# Patient Record
Sex: MALE | Race: ASIAN | NOT HISPANIC OR LATINO | Employment: OTHER
[De-identification: names, ages, dates, MRNs, and addresses within clinical notes are randomized per-mention and may not be internally consistent; named-entity substitution may affect disease eponyms.]

---

## 2017-01-04 ENCOUNTER — RECORDS - HEALTHEAST (OUTPATIENT)
Dept: ADMINISTRATIVE | Facility: OTHER | Age: 72
End: 2017-01-04

## 2017-01-04 ENCOUNTER — COMMUNICATION - HEALTHEAST (OUTPATIENT)
Dept: ADMINISTRATIVE | Facility: CLINIC | Age: 72
End: 2017-01-04

## 2017-01-05 ENCOUNTER — OFFICE VISIT - HEALTHEAST (OUTPATIENT)
Dept: AUDIOLOGY | Facility: CLINIC | Age: 72
End: 2017-01-05

## 2017-01-05 DIAGNOSIS — H90.3 SENSORINEURAL HEARING LOSS, BILATERAL: ICD-10-CM

## 2017-01-10 ENCOUNTER — RECORDS - HEALTHEAST (OUTPATIENT)
Dept: ADMINISTRATIVE | Facility: OTHER | Age: 72
End: 2017-01-10

## 2017-01-16 ENCOUNTER — OFFICE VISIT - HEALTHEAST (OUTPATIENT)
Dept: FAMILY MEDICINE | Facility: CLINIC | Age: 72
End: 2017-01-16

## 2017-01-16 DIAGNOSIS — E78.2 MIXED HYPERLIPIDEMIA: ICD-10-CM

## 2017-01-16 DIAGNOSIS — E11.9 TYPE 2 DIABETES MELLITUS WITHOUT COMPLICATION (H): ICD-10-CM

## 2017-01-16 DIAGNOSIS — Z23 NEED FOR PNEUMOCOCCAL VACCINATION: ICD-10-CM

## 2017-01-16 DIAGNOSIS — R06.09 DYSPNEA ON EXERTION: ICD-10-CM

## 2017-01-16 DIAGNOSIS — I10 ESSENTIAL HYPERTENSION: ICD-10-CM

## 2017-01-16 LAB
ALT SERPL W P-5'-P-CCNC: 33 U/L (ref 0–45)
AST SERPL W P-5'-P-CCNC: 27 U/L (ref 0–40)
CHOLEST SERPL-MCNC: 233 MG/DL
FASTING STATUS PATIENT QL REPORTED: YES
HBA1C MFR BLD: 9 % (ref 3.5–6)
HDLC SERPL-MCNC: 43 MG/DL
LDLC SERPL CALC-MCNC: 156 MG/DL
TRIGL SERPL-MCNC: 171 MG/DL

## 2017-01-16 ASSESSMENT — MIFFLIN-ST. JEOR: SCORE: 1580.21

## 2017-01-23 ENCOUNTER — OFFICE VISIT - HEALTHEAST (OUTPATIENT)
Dept: FAMILY MEDICINE | Facility: CLINIC | Age: 72
End: 2017-01-23

## 2017-01-23 DIAGNOSIS — E78.2 MIXED HYPERLIPIDEMIA: ICD-10-CM

## 2017-01-23 DIAGNOSIS — E11.9 TYPE 2 DIABETES MELLITUS WITHOUT COMPLICATION (H): ICD-10-CM

## 2017-01-23 DIAGNOSIS — Z00.00 HEALTH CARE MAINTENANCE: ICD-10-CM

## 2017-01-23 DIAGNOSIS — I10 ESSENTIAL HYPERTENSION: ICD-10-CM

## 2017-01-23 DIAGNOSIS — Z23 NEED FOR VACCINATION: ICD-10-CM

## 2017-01-26 ENCOUNTER — COMMUNICATION - HEALTHEAST (OUTPATIENT)
Dept: FAMILY MEDICINE | Facility: CLINIC | Age: 72
End: 2017-01-26

## 2017-02-15 ENCOUNTER — COMMUNICATION - HEALTHEAST (OUTPATIENT)
Dept: FAMILY MEDICINE | Facility: CLINIC | Age: 72
End: 2017-02-15

## 2017-02-15 DIAGNOSIS — E11.9 TYPE 2 DIABETES MELLITUS WITHOUT COMPLICATION (H): ICD-10-CM

## 2017-02-22 ENCOUNTER — OFFICE VISIT - HEALTHEAST (OUTPATIENT)
Dept: AUDIOLOGY | Facility: CLINIC | Age: 72
End: 2017-02-22

## 2017-02-22 ENCOUNTER — RECORDS - HEALTHEAST (OUTPATIENT)
Dept: ADMINISTRATIVE | Facility: OTHER | Age: 72
End: 2017-02-22

## 2017-02-22 DIAGNOSIS — H90.3 SENSORINEURAL HEARING LOSS, BILATERAL: ICD-10-CM

## 2017-03-08 ENCOUNTER — RECORDS - HEALTHEAST (OUTPATIENT)
Dept: ADMINISTRATIVE | Facility: OTHER | Age: 72
End: 2017-03-08

## 2017-03-10 ENCOUNTER — OFFICE VISIT - HEALTHEAST (OUTPATIENT)
Dept: AUDIOLOGY | Facility: CLINIC | Age: 72
End: 2017-03-10

## 2017-03-10 DIAGNOSIS — H90.3 SENSORINEURAL HEARING LOSS, BILATERAL: ICD-10-CM

## 2017-05-03 ENCOUNTER — COMMUNICATION - HEALTHEAST (OUTPATIENT)
Dept: SLEEP MEDICINE | Facility: CLINIC | Age: 72
End: 2017-05-03

## 2017-05-03 ENCOUNTER — OFFICE VISIT - HEALTHEAST (OUTPATIENT)
Dept: AUDIOLOGY | Facility: CLINIC | Age: 72
End: 2017-05-03

## 2017-05-03 DIAGNOSIS — H90.3 SENSORINEURAL HEARING LOSS, BILATERAL: ICD-10-CM

## 2017-05-27 ENCOUNTER — COMMUNICATION - HEALTHEAST (OUTPATIENT)
Dept: FAMILY MEDICINE | Facility: CLINIC | Age: 72
End: 2017-05-27

## 2017-05-27 DIAGNOSIS — E11.9 TYPE 2 DIABETES MELLITUS WITHOUT COMPLICATION (H): ICD-10-CM

## 2017-06-01 ENCOUNTER — OFFICE VISIT - HEALTHEAST (OUTPATIENT)
Dept: FAMILY MEDICINE | Facility: CLINIC | Age: 72
End: 2017-06-01

## 2017-06-01 DIAGNOSIS — I10 ESSENTIAL HYPERTENSION: ICD-10-CM

## 2017-06-01 DIAGNOSIS — Z91.09 ENVIRONMENTAL ALLERGIES: ICD-10-CM

## 2017-06-01 DIAGNOSIS — E78.5 HYPERLIPIDEMIA: ICD-10-CM

## 2017-06-01 DIAGNOSIS — E78.2 MIXED HYPERLIPIDEMIA: ICD-10-CM

## 2017-06-01 DIAGNOSIS — E11.9 TYPE 2 DIABETES MELLITUS WITHOUT COMPLICATION (H): ICD-10-CM

## 2017-06-01 LAB
ALT SERPL W P-5'-P-CCNC: 32 U/L (ref 0–45)
AST SERPL W P-5'-P-CCNC: 23 U/L (ref 0–40)
CHOLEST SERPL-MCNC: 140 MG/DL
FASTING STATUS PATIENT QL REPORTED: YES
HBA1C MFR BLD: 11.5 % (ref 3.5–6)
HDLC SERPL-MCNC: 37 MG/DL
LDLC SERPL CALC-MCNC: 56 MG/DL
TRIGL SERPL-MCNC: 233 MG/DL

## 2017-06-01 ASSESSMENT — MIFFLIN-ST. JEOR: SCORE: 1539.95

## 2017-06-06 ENCOUNTER — COMMUNICATION - HEALTHEAST (OUTPATIENT)
Dept: FAMILY MEDICINE | Facility: CLINIC | Age: 72
End: 2017-06-06

## 2017-06-06 DIAGNOSIS — E11.9 TYPE 2 DIABETES MELLITUS WITHOUT COMPLICATION (H): ICD-10-CM

## 2017-07-18 ENCOUNTER — RECORDS - HEALTHEAST (OUTPATIENT)
Dept: ADMINISTRATIVE | Facility: OTHER | Age: 72
End: 2017-07-18

## 2017-08-07 ENCOUNTER — OFFICE VISIT - HEALTHEAST (OUTPATIENT)
Dept: FAMILY MEDICINE | Facility: CLINIC | Age: 72
End: 2017-08-07

## 2017-08-07 DIAGNOSIS — E78.2 MIXED HYPERLIPIDEMIA: ICD-10-CM

## 2017-08-07 DIAGNOSIS — E11.9 TYPE 2 DIABETES MELLITUS WITHOUT COMPLICATION (H): ICD-10-CM

## 2017-08-07 DIAGNOSIS — R80.9 MICROALBUMINURIA: ICD-10-CM

## 2017-08-07 DIAGNOSIS — I10 ESSENTIAL HYPERTENSION: ICD-10-CM

## 2017-08-07 DIAGNOSIS — N52.9 ED (ERECTILE DYSFUNCTION): ICD-10-CM

## 2017-08-07 LAB — HBA1C MFR BLD: 8.8 % (ref 3.5–6)

## 2017-08-07 ASSESSMENT — MIFFLIN-ST. JEOR: SCORE: 1526.34

## 2017-09-15 ENCOUNTER — COMMUNICATION - HEALTHEAST (OUTPATIENT)
Dept: FAMILY MEDICINE | Facility: CLINIC | Age: 72
End: 2017-09-15

## 2017-09-16 ENCOUNTER — COMMUNICATION - HEALTHEAST (OUTPATIENT)
Dept: FAMILY MEDICINE | Facility: CLINIC | Age: 72
End: 2017-09-16

## 2017-10-31 ENCOUNTER — COMMUNICATION - HEALTHEAST (OUTPATIENT)
Dept: AUDIOLOGY | Facility: CLINIC | Age: 72
End: 2017-10-31

## 2017-10-31 ENCOUNTER — COMMUNICATION - HEALTHEAST (OUTPATIENT)
Dept: SURGERY | Facility: CLINIC | Age: 72
End: 2017-10-31

## 2017-11-01 ENCOUNTER — OFFICE VISIT - HEALTHEAST (OUTPATIENT)
Dept: AUDIOLOGY | Facility: CLINIC | Age: 72
End: 2017-11-01

## 2017-11-01 DIAGNOSIS — H90.3 SENSORINEURAL HEARING LOSS, BILATERAL: ICD-10-CM

## 2018-01-10 ENCOUNTER — OFFICE VISIT - HEALTHEAST (OUTPATIENT)
Dept: FAMILY MEDICINE | Facility: CLINIC | Age: 73
End: 2018-01-10

## 2018-01-10 DIAGNOSIS — E78.5 HYPERLIPIDEMIA: ICD-10-CM

## 2018-01-10 DIAGNOSIS — Z91.09 ENVIRONMENTAL ALLERGIES: ICD-10-CM

## 2018-01-10 DIAGNOSIS — I10 ESSENTIAL HYPERTENSION: ICD-10-CM

## 2018-01-10 DIAGNOSIS — H91.90 HEARING LOSS: ICD-10-CM

## 2018-01-10 DIAGNOSIS — E11.9 TYPE 2 DIABETES MELLITUS WITHOUT COMPLICATION (H): ICD-10-CM

## 2018-01-10 DIAGNOSIS — N52.9 ED (ERECTILE DYSFUNCTION): ICD-10-CM

## 2018-01-10 LAB
ANION GAP SERPL CALCULATED.3IONS-SCNC: 9 MMOL/L (ref 5–18)
BUN SERPL-MCNC: 19 MG/DL (ref 8–28)
CALCIUM SERPL-MCNC: 9.7 MG/DL (ref 8.5–10.5)
CHLORIDE BLD-SCNC: 103 MMOL/L (ref 98–107)
CO2 SERPL-SCNC: 28 MMOL/L (ref 22–31)
CREAT SERPL-MCNC: 1.1 MG/DL (ref 0.7–1.3)
GFR SERPL CREATININE-BSD FRML MDRD: >60 ML/MIN/1.73M2
GLUCOSE BLD-MCNC: 249 MG/DL (ref 70–125)
HBA1C MFR BLD: 8.3 % (ref 3.5–6)
POTASSIUM BLD-SCNC: 4.6 MMOL/L (ref 3.5–5)
SODIUM SERPL-SCNC: 140 MMOL/L (ref 136–145)

## 2018-01-10 ASSESSMENT — MIFFLIN-ST. JEOR: SCORE: 1535.41

## 2018-01-11 ENCOUNTER — COMMUNICATION - HEALTHEAST (OUTPATIENT)
Dept: FAMILY MEDICINE | Facility: CLINIC | Age: 73
End: 2018-01-11

## 2018-01-18 ENCOUNTER — COMMUNICATION - HEALTHEAST (OUTPATIENT)
Dept: FAMILY MEDICINE | Facility: CLINIC | Age: 73
End: 2018-01-18

## 2018-01-23 ENCOUNTER — RECORDS - HEALTHEAST (OUTPATIENT)
Dept: ADMINISTRATIVE | Facility: OTHER | Age: 73
End: 2018-01-23

## 2018-04-03 ENCOUNTER — COMMUNICATION - HEALTHEAST (OUTPATIENT)
Dept: SURGERY | Facility: CLINIC | Age: 73
End: 2018-04-03

## 2018-04-10 ENCOUNTER — COMMUNICATION - HEALTHEAST (OUTPATIENT)
Dept: AUDIOLOGY | Facility: CLINIC | Age: 73
End: 2018-04-10

## 2018-04-30 ENCOUNTER — COMMUNICATION - HEALTHEAST (OUTPATIENT)
Dept: AUDIOLOGY | Facility: CLINIC | Age: 73
End: 2018-04-30

## 2018-05-06 ENCOUNTER — COMMUNICATION - HEALTHEAST (OUTPATIENT)
Dept: FAMILY MEDICINE | Facility: CLINIC | Age: 73
End: 2018-05-06

## 2018-05-06 DIAGNOSIS — I10 ESSENTIAL HYPERTENSION: ICD-10-CM

## 2018-07-19 ENCOUNTER — COMMUNICATION - HEALTHEAST (OUTPATIENT)
Dept: FAMILY MEDICINE | Facility: CLINIC | Age: 73
End: 2018-07-19

## 2018-07-24 ENCOUNTER — OFFICE VISIT - HEALTHEAST (OUTPATIENT)
Dept: FAMILY MEDICINE | Facility: CLINIC | Age: 73
End: 2018-07-24

## 2018-07-24 DIAGNOSIS — R80.9 MICROALBUMINURIA: ICD-10-CM

## 2018-07-24 DIAGNOSIS — E78.2 MIXED HYPERLIPIDEMIA: ICD-10-CM

## 2018-07-24 DIAGNOSIS — I10 ESSENTIAL HYPERTENSION: ICD-10-CM

## 2018-07-24 LAB
ALBUMIN SERPL-MCNC: 4.1 G/DL (ref 3.5–5)
ALP SERPL-CCNC: 62 U/L (ref 45–120)
ALT SERPL W P-5'-P-CCNC: 43 U/L (ref 0–45)
ANION GAP SERPL CALCULATED.3IONS-SCNC: 10 MMOL/L (ref 5–18)
AST SERPL W P-5'-P-CCNC: 39 U/L (ref 0–40)
BILIRUB SERPL-MCNC: 0.9 MG/DL (ref 0–1)
BUN SERPL-MCNC: 13 MG/DL (ref 8–28)
CALCIUM SERPL-MCNC: 10.2 MG/DL (ref 8.5–10.5)
CHLORIDE BLD-SCNC: 104 MMOL/L (ref 98–107)
CHOLEST SERPL-MCNC: 199 MG/DL
CO2 SERPL-SCNC: 27 MMOL/L (ref 22–31)
CREAT SERPL-MCNC: 1.08 MG/DL (ref 0.7–1.3)
CREAT UR-MCNC: 239.1 MG/DL
FASTING STATUS PATIENT QL REPORTED: YES
GFR SERPL CREATININE-BSD FRML MDRD: >60 ML/MIN/1.73M2
GLUCOSE BLD-MCNC: 181 MG/DL (ref 70–125)
HBA1C MFR BLD: 7.8 % (ref 3.5–6)
HDLC SERPL-MCNC: 46 MG/DL
LDLC SERPL CALC-MCNC: 105 MG/DL
MICROALBUMIN UR-MCNC: 1.39 MG/DL (ref 0–1.99)
MICROALBUMIN/CREAT UR: 5.8 MG/G
POTASSIUM BLD-SCNC: 4.6 MMOL/L (ref 3.5–5)
PROT SERPL-MCNC: 8 G/DL (ref 6–8)
SODIUM SERPL-SCNC: 141 MMOL/L (ref 136–145)
TRIGL SERPL-MCNC: 242 MG/DL

## 2018-07-25 ENCOUNTER — COMMUNICATION - HEALTHEAST (OUTPATIENT)
Dept: FAMILY MEDICINE | Facility: CLINIC | Age: 73
End: 2018-07-25

## 2018-07-31 ENCOUNTER — COMMUNICATION - HEALTHEAST (OUTPATIENT)
Dept: FAMILY MEDICINE | Facility: CLINIC | Age: 73
End: 2018-07-31

## 2018-07-31 DIAGNOSIS — E11.9 TYPE 2 DIABETES MELLITUS WITHOUT COMPLICATION (H): ICD-10-CM

## 2018-08-21 ENCOUNTER — COMMUNICATION - HEALTHEAST (OUTPATIENT)
Dept: FAMILY MEDICINE | Facility: CLINIC | Age: 73
End: 2018-08-21

## 2018-08-21 DIAGNOSIS — I10 ESSENTIAL HYPERTENSION: ICD-10-CM

## 2018-08-22 ENCOUNTER — COMMUNICATION - HEALTHEAST (OUTPATIENT)
Dept: FAMILY MEDICINE | Facility: CLINIC | Age: 73
End: 2018-08-22

## 2018-08-22 DIAGNOSIS — Z91.09 ENVIRONMENTAL ALLERGIES: ICD-10-CM

## 2018-08-22 DIAGNOSIS — I10 ESSENTIAL HYPERTENSION: ICD-10-CM

## 2018-11-27 ENCOUNTER — COMMUNICATION - HEALTHEAST (OUTPATIENT)
Dept: FAMILY MEDICINE | Facility: CLINIC | Age: 73
End: 2018-11-27

## 2018-11-27 DIAGNOSIS — E11.9 TYPE 2 DIABETES MELLITUS WITHOUT COMPLICATION (H): ICD-10-CM

## 2019-07-29 ENCOUNTER — COMMUNICATION - HEALTHEAST (OUTPATIENT)
Dept: FAMILY MEDICINE | Facility: CLINIC | Age: 74
End: 2019-07-29

## 2019-07-29 DIAGNOSIS — E11.9 TYPE 2 DIABETES MELLITUS WITHOUT COMPLICATION (H): ICD-10-CM

## 2019-07-29 DIAGNOSIS — I10 ESSENTIAL HYPERTENSION: ICD-10-CM

## 2019-08-16 ENCOUNTER — COMMUNICATION - HEALTHEAST (OUTPATIENT)
Dept: FAMILY MEDICINE | Facility: CLINIC | Age: 74
End: 2019-08-16

## 2019-08-16 DIAGNOSIS — I10 ESSENTIAL HYPERTENSION: ICD-10-CM

## 2019-08-21 ENCOUNTER — COMMUNICATION - HEALTHEAST (OUTPATIENT)
Dept: FAMILY MEDICINE | Facility: CLINIC | Age: 74
End: 2019-08-21

## 2019-08-21 DIAGNOSIS — I10 ESSENTIAL HYPERTENSION: ICD-10-CM

## 2019-09-05 ENCOUNTER — OFFICE VISIT - HEALTHEAST (OUTPATIENT)
Dept: FAMILY MEDICINE | Facility: CLINIC | Age: 74
End: 2019-09-05

## 2019-09-05 DIAGNOSIS — I10 ESSENTIAL HYPERTENSION: ICD-10-CM

## 2019-09-05 DIAGNOSIS — R80.9 MICROALBUMINURIA: ICD-10-CM

## 2019-09-05 DIAGNOSIS — E11.29 TYPE 2 DIABETES MELLITUS WITH MICROALBUMINURIA, WITHOUT LONG-TERM CURRENT USE OF INSULIN (H): ICD-10-CM

## 2019-09-05 DIAGNOSIS — E11.9 TYPE 2 DIABETES MELLITUS WITHOUT COMPLICATION (H): ICD-10-CM

## 2019-09-05 DIAGNOSIS — E78.5 HYPERLIPIDEMIA: ICD-10-CM

## 2019-09-05 DIAGNOSIS — E78.2 MIXED HYPERLIPIDEMIA: ICD-10-CM

## 2019-09-05 DIAGNOSIS — R80.9 TYPE 2 DIABETES MELLITUS WITH MICROALBUMINURIA, WITHOUT LONG-TERM CURRENT USE OF INSULIN (H): ICD-10-CM

## 2019-09-05 LAB
ALBUMIN SERPL-MCNC: 4.1 G/DL (ref 3.5–5)
ALP SERPL-CCNC: 58 U/L (ref 45–120)
ALT SERPL W P-5'-P-CCNC: 47 U/L (ref 0–45)
ANION GAP SERPL CALCULATED.3IONS-SCNC: 14 MMOL/L (ref 5–18)
AST SERPL W P-5'-P-CCNC: 40 U/L (ref 0–40)
BILIRUB SERPL-MCNC: 0.5 MG/DL (ref 0–1)
BUN SERPL-MCNC: 22 MG/DL (ref 8–28)
CALCIUM SERPL-MCNC: 10 MG/DL (ref 8.5–10.5)
CHLORIDE BLD-SCNC: 99 MMOL/L (ref 98–107)
CHOLEST SERPL-MCNC: 198 MG/DL
CO2 SERPL-SCNC: 25 MMOL/L (ref 22–31)
CREAT SERPL-MCNC: 1.19 MG/DL (ref 0.7–1.3)
CREAT UR-MCNC: 132.5 MG/DL
FASTING STATUS PATIENT QL REPORTED: YES
GFR SERPL CREATININE-BSD FRML MDRD: 60 ML/MIN/1.73M2
GLUCOSE BLD-MCNC: 193 MG/DL (ref 70–125)
HBA1C MFR BLD: 8.2 % (ref 3.5–6)
HDLC SERPL-MCNC: 43 MG/DL
LDLC SERPL CALC-MCNC: 108 MG/DL
MICROALBUMIN UR-MCNC: 0.91 MG/DL (ref 0–1.99)
MICROALBUMIN/CREAT UR: 6.9 MG/G
POTASSIUM BLD-SCNC: 4.4 MMOL/L (ref 3.5–5)
PROT SERPL-MCNC: 8.1 G/DL (ref 6–8)
SODIUM SERPL-SCNC: 138 MMOL/L (ref 136–145)
TRIGL SERPL-MCNC: 236 MG/DL

## 2019-09-05 ASSESSMENT — MIFFLIN-ST. JEOR: SCORE: 1535.98

## 2019-09-10 ENCOUNTER — COMMUNICATION - HEALTHEAST (OUTPATIENT)
Dept: FAMILY MEDICINE | Facility: CLINIC | Age: 74
End: 2019-09-10

## 2019-12-19 ENCOUNTER — COMMUNICATION - HEALTHEAST (OUTPATIENT)
Dept: FAMILY MEDICINE | Facility: CLINIC | Age: 74
End: 2019-12-19

## 2019-12-19 DIAGNOSIS — Z91.09 ENVIRONMENTAL ALLERGIES: ICD-10-CM

## 2019-12-23 ENCOUNTER — COMMUNICATION - HEALTHEAST (OUTPATIENT)
Dept: FAMILY MEDICINE | Facility: CLINIC | Age: 74
End: 2019-12-23

## 2020-01-09 ENCOUNTER — OFFICE VISIT - HEALTHEAST (OUTPATIENT)
Dept: FAMILY MEDICINE | Facility: CLINIC | Age: 75
End: 2020-01-09

## 2020-01-09 DIAGNOSIS — R80.9 TYPE 2 DIABETES MELLITUS WITH MICROALBUMINURIA, WITHOUT LONG-TERM CURRENT USE OF INSULIN (H): ICD-10-CM

## 2020-01-09 DIAGNOSIS — I10 ESSENTIAL HYPERTENSION: ICD-10-CM

## 2020-01-09 DIAGNOSIS — E11.29 TYPE 2 DIABETES MELLITUS WITH MICROALBUMINURIA, WITHOUT LONG-TERM CURRENT USE OF INSULIN (H): ICD-10-CM

## 2020-01-09 DIAGNOSIS — E78.2 MIXED HYPERLIPIDEMIA: ICD-10-CM

## 2020-01-09 DIAGNOSIS — N40.0 BENIGN PROSTATIC HYPERPLASIA, UNSPECIFIED WHETHER LOWER URINARY TRACT SYMPTOMS PRESENT: ICD-10-CM

## 2020-01-09 LAB — HBA1C MFR BLD: 9.2 % (ref 3.5–6)

## 2020-01-10 ENCOUNTER — COMMUNICATION - HEALTHEAST (OUTPATIENT)
Dept: FAMILY MEDICINE | Facility: CLINIC | Age: 75
End: 2020-01-10

## 2020-02-24 ENCOUNTER — OFFICE VISIT - HEALTHEAST (OUTPATIENT)
Dept: FAMILY MEDICINE | Facility: CLINIC | Age: 75
End: 2020-02-24

## 2020-02-24 DIAGNOSIS — E78.2 MIXED HYPERLIPIDEMIA: ICD-10-CM

## 2020-02-24 DIAGNOSIS — I10 ESSENTIAL HYPERTENSION: ICD-10-CM

## 2020-02-24 DIAGNOSIS — Z12.11 COLON CANCER SCREENING: ICD-10-CM

## 2020-02-24 DIAGNOSIS — R80.9 MICROALBUMINURIA: ICD-10-CM

## 2020-02-24 DIAGNOSIS — Z11.59 SCREENING FOR VIRAL DISEASE: ICD-10-CM

## 2020-02-24 DIAGNOSIS — E11.29 TYPE 2 DIABETES MELLITUS WITH MICROALBUMINURIA, WITHOUT LONG-TERM CURRENT USE OF INSULIN (H): ICD-10-CM

## 2020-02-24 DIAGNOSIS — R80.9 TYPE 2 DIABETES MELLITUS WITH MICROALBUMINURIA, WITHOUT LONG-TERM CURRENT USE OF INSULIN (H): ICD-10-CM

## 2020-02-24 DIAGNOSIS — Z23 IMMUNIZATION DUE: ICD-10-CM

## 2020-02-24 LAB — HBA1C MFR BLD: 8.4 % (ref 3.5–6)

## 2020-02-25 LAB — HCV AB SERPL QL IA: NEGATIVE

## 2020-02-27 ENCOUNTER — COMMUNICATION - HEALTHEAST (OUTPATIENT)
Dept: FAMILY MEDICINE | Facility: CLINIC | Age: 75
End: 2020-02-27

## 2020-03-02 ENCOUNTER — RECORDS - HEALTHEAST (OUTPATIENT)
Dept: ADMINISTRATIVE | Facility: OTHER | Age: 75
End: 2020-03-02

## 2020-03-02 LAB — COLOGUARD-ABSTRACT: NEGATIVE

## 2020-03-08 ENCOUNTER — COMMUNICATION - HEALTHEAST (OUTPATIENT)
Dept: FAMILY MEDICINE | Facility: CLINIC | Age: 75
End: 2020-03-08

## 2020-03-08 DIAGNOSIS — R80.9 TYPE 2 DIABETES MELLITUS WITH MICROALBUMINURIA, WITHOUT LONG-TERM CURRENT USE OF INSULIN (H): ICD-10-CM

## 2020-03-08 DIAGNOSIS — E11.29 TYPE 2 DIABETES MELLITUS WITH MICROALBUMINURIA, WITHOUT LONG-TERM CURRENT USE OF INSULIN (H): ICD-10-CM

## 2020-03-13 ENCOUNTER — RECORDS - HEALTHEAST (OUTPATIENT)
Dept: HEALTH INFORMATION MANAGEMENT | Facility: CLINIC | Age: 75
End: 2020-03-13

## 2020-06-12 ENCOUNTER — COMMUNICATION - HEALTHEAST (OUTPATIENT)
Dept: FAMILY MEDICINE | Facility: CLINIC | Age: 75
End: 2020-06-12

## 2020-06-12 DIAGNOSIS — E78.5 HYPERLIPIDEMIA: ICD-10-CM

## 2020-06-16 ENCOUNTER — OFFICE VISIT - HEALTHEAST (OUTPATIENT)
Dept: FAMILY MEDICINE | Facility: CLINIC | Age: 75
End: 2020-06-16

## 2020-06-16 DIAGNOSIS — I10 ESSENTIAL HYPERTENSION: ICD-10-CM

## 2020-06-16 DIAGNOSIS — R80.9 MICROALBUMINURIA: ICD-10-CM

## 2020-06-16 DIAGNOSIS — Z23 IMMUNIZATION DUE: ICD-10-CM

## 2020-06-16 DIAGNOSIS — E11.9 TYPE 2 DIABETES MELLITUS WITHOUT COMPLICATION (H): ICD-10-CM

## 2020-06-16 DIAGNOSIS — E78.2 MIXED HYPERLIPIDEMIA: ICD-10-CM

## 2020-06-16 DIAGNOSIS — R80.9 TYPE 2 DIABETES MELLITUS WITH MICROALBUMINURIA, WITHOUT LONG-TERM CURRENT USE OF INSULIN (H): ICD-10-CM

## 2020-06-16 DIAGNOSIS — E11.29 TYPE 2 DIABETES MELLITUS WITH MICROALBUMINURIA, WITHOUT LONG-TERM CURRENT USE OF INSULIN (H): ICD-10-CM

## 2020-06-16 DIAGNOSIS — N40.0 BENIGN PROSTATIC HYPERPLASIA, UNSPECIFIED WHETHER LOWER URINARY TRACT SYMPTOMS PRESENT: ICD-10-CM

## 2020-08-18 ENCOUNTER — COMMUNICATION - HEALTHEAST (OUTPATIENT)
Dept: FAMILY MEDICINE | Facility: CLINIC | Age: 75
End: 2020-08-18

## 2020-09-03 ENCOUNTER — OFFICE VISIT - HEALTHEAST (OUTPATIENT)
Dept: FAMILY MEDICINE | Facility: CLINIC | Age: 75
End: 2020-09-03

## 2020-09-03 ENCOUNTER — COMMUNICATION - HEALTHEAST (OUTPATIENT)
Dept: FAMILY MEDICINE | Facility: CLINIC | Age: 75
End: 2020-09-03

## 2020-09-03 DIAGNOSIS — E11.29 TYPE 2 DIABETES MELLITUS WITH MICROALBUMINURIA, WITHOUT LONG-TERM CURRENT USE OF INSULIN (H): ICD-10-CM

## 2020-09-03 DIAGNOSIS — I10 ESSENTIAL HYPERTENSION: ICD-10-CM

## 2020-09-03 DIAGNOSIS — Z91.09 ENVIRONMENTAL ALLERGIES: ICD-10-CM

## 2020-09-03 DIAGNOSIS — E78.2 MIXED HYPERLIPIDEMIA: ICD-10-CM

## 2020-09-03 DIAGNOSIS — E11.9 TYPE 2 DIABETES MELLITUS WITHOUT COMPLICATION (H): ICD-10-CM

## 2020-09-03 DIAGNOSIS — R80.9 TYPE 2 DIABETES MELLITUS WITH MICROALBUMINURIA, WITHOUT LONG-TERM CURRENT USE OF INSULIN (H): ICD-10-CM

## 2020-09-30 ENCOUNTER — COMMUNICATION - HEALTHEAST (OUTPATIENT)
Dept: FAMILY MEDICINE | Facility: CLINIC | Age: 75
End: 2020-09-30

## 2020-09-30 DIAGNOSIS — I10 ESSENTIAL HYPERTENSION: ICD-10-CM

## 2020-10-05 ENCOUNTER — AMBULATORY - HEALTHEAST (OUTPATIENT)
Dept: LAB | Facility: CLINIC | Age: 75
End: 2020-10-05

## 2020-10-05 DIAGNOSIS — E11.29 TYPE 2 DIABETES MELLITUS WITH MICROALBUMINURIA, WITHOUT LONG-TERM CURRENT USE OF INSULIN (H): ICD-10-CM

## 2020-10-05 DIAGNOSIS — I10 ESSENTIAL HYPERTENSION: ICD-10-CM

## 2020-10-05 DIAGNOSIS — E78.2 MIXED HYPERLIPIDEMIA: ICD-10-CM

## 2020-10-05 DIAGNOSIS — R80.9 TYPE 2 DIABETES MELLITUS WITH MICROALBUMINURIA, WITHOUT LONG-TERM CURRENT USE OF INSULIN (H): ICD-10-CM

## 2020-10-05 LAB
ALBUMIN SERPL-MCNC: 4.2 G/DL (ref 3.5–5)
ALP SERPL-CCNC: 61 U/L (ref 45–120)
ALT SERPL W P-5'-P-CCNC: 28 U/L (ref 0–45)
ANION GAP SERPL CALCULATED.3IONS-SCNC: 14 MMOL/L (ref 5–18)
AST SERPL W P-5'-P-CCNC: 23 U/L (ref 0–40)
BILIRUB SERPL-MCNC: 0.8 MG/DL (ref 0–1)
BUN SERPL-MCNC: 15 MG/DL (ref 8–28)
CALCIUM SERPL-MCNC: 9.7 MG/DL (ref 8.5–10.5)
CHLORIDE BLD-SCNC: 99 MMOL/L (ref 98–107)
CHOLEST SERPL-MCNC: 131 MG/DL
CO2 SERPL-SCNC: 27 MMOL/L (ref 22–31)
CREAT SERPL-MCNC: 1.15 MG/DL (ref 0.7–1.3)
FASTING STATUS PATIENT QL REPORTED: YES
GFR SERPL CREATININE-BSD FRML MDRD: >60 ML/MIN/1.73M2
GLUCOSE BLD-MCNC: 135 MG/DL (ref 70–125)
HBA1C MFR BLD: 7.8 %
HDLC SERPL-MCNC: 43 MG/DL
LDLC SERPL CALC-MCNC: 54 MG/DL
POTASSIUM BLD-SCNC: 4.2 MMOL/L (ref 3.5–5)
PROT SERPL-MCNC: 7.8 G/DL (ref 6–8)
SODIUM SERPL-SCNC: 140 MMOL/L (ref 136–145)
TRIGL SERPL-MCNC: 170 MG/DL

## 2020-10-08 ENCOUNTER — COMMUNICATION - HEALTHEAST (OUTPATIENT)
Dept: FAMILY MEDICINE | Facility: CLINIC | Age: 75
End: 2020-10-08

## 2020-11-16 ENCOUNTER — RECORDS - HEALTHEAST (OUTPATIENT)
Dept: ADMINISTRATIVE | Facility: OTHER | Age: 75
End: 2020-11-16

## 2020-12-09 ENCOUNTER — COMMUNICATION - HEALTHEAST (OUTPATIENT)
Dept: FAMILY MEDICINE | Facility: CLINIC | Age: 75
End: 2020-12-09

## 2020-12-09 DIAGNOSIS — E11.9 TYPE 2 DIABETES MELLITUS WITHOUT COMPLICATION (H): ICD-10-CM

## 2020-12-21 ENCOUNTER — COMMUNICATION - HEALTHEAST (OUTPATIENT)
Dept: FAMILY MEDICINE | Facility: CLINIC | Age: 75
End: 2020-12-21

## 2020-12-21 DIAGNOSIS — E11.9 TYPE 2 DIABETES MELLITUS WITHOUT COMPLICATION (H): ICD-10-CM

## 2020-12-21 RX ORDER — CARVEDILOL 25 MG/1
TABLET, FILM COATED ORAL
Qty: 100 STRIP | Refills: 0 | Status: SHIPPED | OUTPATIENT
Start: 2020-12-21 | End: 2022-04-25

## 2020-12-30 ENCOUNTER — COMMUNICATION - HEALTHEAST (OUTPATIENT)
Dept: FAMILY MEDICINE | Facility: CLINIC | Age: 75
End: 2020-12-30

## 2020-12-30 DIAGNOSIS — I10 ESSENTIAL HYPERTENSION: ICD-10-CM

## 2021-01-05 ENCOUNTER — OFFICE VISIT - HEALTHEAST (OUTPATIENT)
Dept: FAMILY MEDICINE | Facility: CLINIC | Age: 76
End: 2021-01-05

## 2021-01-05 DIAGNOSIS — R80.9 TYPE 2 DIABETES MELLITUS WITH MICROALBUMINURIA, WITHOUT LONG-TERM CURRENT USE OF INSULIN (H): ICD-10-CM

## 2021-01-05 DIAGNOSIS — E11.9 TYPE 2 DIABETES MELLITUS WITHOUT COMPLICATION (H): ICD-10-CM

## 2021-01-05 DIAGNOSIS — R80.9 MICROALBUMINURIA: ICD-10-CM

## 2021-01-05 DIAGNOSIS — E11.29 TYPE 2 DIABETES MELLITUS WITH MICROALBUMINURIA, WITHOUT LONG-TERM CURRENT USE OF INSULIN (H): ICD-10-CM

## 2021-01-05 DIAGNOSIS — N40.0 BENIGN PROSTATIC HYPERPLASIA, UNSPECIFIED WHETHER LOWER URINARY TRACT SYMPTOMS PRESENT: ICD-10-CM

## 2021-01-05 DIAGNOSIS — R35.1 NOCTURIA: ICD-10-CM

## 2021-01-05 DIAGNOSIS — E78.2 MIXED HYPERLIPIDEMIA: ICD-10-CM

## 2021-01-05 DIAGNOSIS — E78.5 HYPERLIPIDEMIA: ICD-10-CM

## 2021-01-05 DIAGNOSIS — I10 ESSENTIAL HYPERTENSION: ICD-10-CM

## 2021-01-05 RX ORDER — ATORVASTATIN CALCIUM 40 MG/1
40 TABLET, FILM COATED ORAL DAILY
Qty: 90 TABLET | Refills: 1 | Status: SHIPPED | OUTPATIENT
Start: 2021-01-05 | End: 2021-09-15

## 2021-01-05 RX ORDER — METFORMIN HCL 500 MG
TABLET, EXTENDED RELEASE 24 HR ORAL
Qty: 360 TABLET | Refills: 1 | Status: SHIPPED | OUTPATIENT
Start: 2021-01-05 | End: 2021-09-14

## 2021-01-05 RX ORDER — LISINOPRIL 40 MG/1
40 TABLET ORAL DAILY
Qty: 90 TABLET | Refills: 1 | Status: SHIPPED | OUTPATIENT
Start: 2021-01-05 | End: 2021-08-12

## 2021-01-05 RX ORDER — DILTIAZEM HYDROCHLORIDE 240 MG/1
240 CAPSULE, COATED, EXTENDED RELEASE ORAL DAILY
Qty: 90 CAPSULE | Refills: 1 | Status: SHIPPED | OUTPATIENT
Start: 2021-01-05 | End: 2021-09-21

## 2021-01-05 RX ORDER — TAMSULOSIN HYDROCHLORIDE 0.4 MG/1
0.4 CAPSULE ORAL
Qty: 90 CAPSULE | Refills: 1 | Status: SHIPPED | OUTPATIENT
Start: 2021-01-05 | End: 2021-09-21

## 2021-01-14 ENCOUNTER — RECORDS - HEALTHEAST (OUTPATIENT)
Dept: ADMINISTRATIVE | Facility: OTHER | Age: 76
End: 2021-01-14

## 2021-01-14 LAB — RETINOPATHY: NEGATIVE

## 2021-01-18 ENCOUNTER — RECORDS - HEALTHEAST (OUTPATIENT)
Dept: HEALTH INFORMATION MANAGEMENT | Facility: CLINIC | Age: 76
End: 2021-01-18

## 2021-01-22 ENCOUNTER — AMBULATORY - HEALTHEAST (OUTPATIENT)
Dept: LAB | Facility: CLINIC | Age: 76
End: 2021-01-22

## 2021-01-22 ENCOUNTER — COMMUNICATION - HEALTHEAST (OUTPATIENT)
Dept: FAMILY MEDICINE | Facility: CLINIC | Age: 76
End: 2021-01-22

## 2021-01-22 ENCOUNTER — AMBULATORY - HEALTHEAST (OUTPATIENT)
Dept: NURSING | Facility: CLINIC | Age: 76
End: 2021-01-22

## 2021-01-22 DIAGNOSIS — R80.9 MICROALBUMINURIA: ICD-10-CM

## 2021-01-22 DIAGNOSIS — R35.1 NOCTURIA: ICD-10-CM

## 2021-01-22 DIAGNOSIS — N40.0 BENIGN PROSTATIC HYPERPLASIA, UNSPECIFIED WHETHER LOWER URINARY TRACT SYMPTOMS PRESENT: ICD-10-CM

## 2021-01-22 DIAGNOSIS — I10 ESSENTIAL HYPERTENSION: ICD-10-CM

## 2021-01-22 DIAGNOSIS — R80.9 TYPE 2 DIABETES MELLITUS WITH MICROALBUMINURIA, WITHOUT LONG-TERM CURRENT USE OF INSULIN (H): ICD-10-CM

## 2021-01-22 DIAGNOSIS — E11.29 TYPE 2 DIABETES MELLITUS WITH MICROALBUMINURIA, WITHOUT LONG-TERM CURRENT USE OF INSULIN (H): ICD-10-CM

## 2021-01-22 LAB
CREAT UR-MCNC: 144 MG/DL
HBA1C MFR BLD: 8 %
MICROALBUMIN UR-MCNC: 6.04 MG/DL (ref 0–1.99)
MICROALBUMIN/CREAT UR: 41.9 MG/G
PSA SERPL-MCNC: 0.5 NG/ML (ref 0–6.5)

## 2021-01-25 ENCOUNTER — AMBULATORY - HEALTHEAST (OUTPATIENT)
Dept: FAMILY MEDICINE | Facility: CLINIC | Age: 76
End: 2021-01-25

## 2021-01-25 DIAGNOSIS — I10 ESSENTIAL HYPERTENSION: ICD-10-CM

## 2021-01-25 DIAGNOSIS — R80.9 TYPE 2 DIABETES MELLITUS WITH MICROALBUMINURIA, WITHOUT LONG-TERM CURRENT USE OF INSULIN (H): ICD-10-CM

## 2021-01-25 DIAGNOSIS — E11.29 TYPE 2 DIABETES MELLITUS WITH MICROALBUMINURIA, WITHOUT LONG-TERM CURRENT USE OF INSULIN (H): ICD-10-CM

## 2021-03-10 ENCOUNTER — OFFICE VISIT - HEALTHEAST (OUTPATIENT)
Dept: FAMILY MEDICINE | Facility: CLINIC | Age: 76
End: 2021-03-10

## 2021-03-10 DIAGNOSIS — E11.29 TYPE 2 DIABETES MELLITUS WITH MICROALBUMINURIA, WITHOUT LONG-TERM CURRENT USE OF INSULIN (H): ICD-10-CM

## 2021-03-10 DIAGNOSIS — R80.9 TYPE 2 DIABETES MELLITUS WITH MICROALBUMINURIA, WITHOUT LONG-TERM CURRENT USE OF INSULIN (H): ICD-10-CM

## 2021-03-10 DIAGNOSIS — E78.2 MIXED HYPERLIPIDEMIA: ICD-10-CM

## 2021-03-10 DIAGNOSIS — R80.9 MICROALBUMINURIA: ICD-10-CM

## 2021-03-10 DIAGNOSIS — I10 ESSENTIAL HYPERTENSION: ICD-10-CM

## 2021-03-10 RX ORDER — METOPROLOL SUCCINATE 50 MG/1
50 TABLET, EXTENDED RELEASE ORAL DAILY
Qty: 30 TABLET | Refills: 3 | Status: SHIPPED | OUTPATIENT
Start: 2021-03-10 | End: 2021-08-13

## 2021-03-18 ENCOUNTER — AMBULATORY - HEALTHEAST (OUTPATIENT)
Dept: FAMILY MEDICINE | Facility: CLINIC | Age: 76
End: 2021-03-18

## 2021-03-18 ENCOUNTER — COMMUNICATION - HEALTHEAST (OUTPATIENT)
Dept: FAMILY MEDICINE | Facility: CLINIC | Age: 76
End: 2021-03-18

## 2021-03-18 ENCOUNTER — AMBULATORY - HEALTHEAST (OUTPATIENT)
Dept: NURSING | Facility: CLINIC | Age: 76
End: 2021-03-18

## 2021-03-18 ENCOUNTER — AMBULATORY - HEALTHEAST (OUTPATIENT)
Dept: LAB | Facility: CLINIC | Age: 76
End: 2021-03-18

## 2021-03-18 DIAGNOSIS — I10 ESSENTIAL HYPERTENSION: ICD-10-CM

## 2021-03-18 DIAGNOSIS — Z01.30 BLOOD PRESSURE CHECK: ICD-10-CM

## 2021-03-18 DIAGNOSIS — E11.29 TYPE 2 DIABETES MELLITUS WITH MICROALBUMINURIA, WITHOUT LONG-TERM CURRENT USE OF INSULIN (H): ICD-10-CM

## 2021-03-18 DIAGNOSIS — R80.9 TYPE 2 DIABETES MELLITUS WITH MICROALBUMINURIA, WITHOUT LONG-TERM CURRENT USE OF INSULIN (H): ICD-10-CM

## 2021-03-18 LAB — HBA1C MFR BLD: 7.4 %

## 2021-03-18 RX ORDER — HYDROCHLOROTHIAZIDE 12.5 MG/1
12.5 TABLET ORAL DAILY
Qty: 30 TABLET | Refills: 3 | Status: SHIPPED | OUTPATIENT
Start: 2021-03-18 | End: 2021-07-14

## 2021-03-20 ENCOUNTER — COMMUNICATION - HEALTHEAST (OUTPATIENT)
Dept: FAMILY MEDICINE | Facility: CLINIC | Age: 76
End: 2021-03-20

## 2021-03-20 DIAGNOSIS — Z91.09 ENVIRONMENTAL ALLERGIES: ICD-10-CM

## 2021-03-22 RX ORDER — LORATADINE 10 MG/1
TABLET ORAL
Qty: 90 TABLET | Refills: 3 | Status: SHIPPED | OUTPATIENT
Start: 2021-03-22 | End: 2022-05-20

## 2021-05-18 ENCOUNTER — COMMUNICATION - HEALTHEAST (OUTPATIENT)
Dept: FAMILY MEDICINE | Facility: CLINIC | Age: 76
End: 2021-05-18

## 2021-05-18 DIAGNOSIS — E11.29 TYPE 2 DIABETES MELLITUS WITH MICROALBUMINURIA, WITHOUT LONG-TERM CURRENT USE OF INSULIN (H): ICD-10-CM

## 2021-05-18 DIAGNOSIS — R80.9 TYPE 2 DIABETES MELLITUS WITH MICROALBUMINURIA, WITHOUT LONG-TERM CURRENT USE OF INSULIN (H): ICD-10-CM

## 2021-05-23 ENCOUNTER — COMMUNICATION - HEALTHEAST (OUTPATIENT)
Dept: FAMILY MEDICINE | Facility: CLINIC | Age: 76
End: 2021-05-23

## 2021-05-23 DIAGNOSIS — E11.29 TYPE 2 DIABETES MELLITUS WITH MICROALBUMINURIA, WITHOUT LONG-TERM CURRENT USE OF INSULIN (H): ICD-10-CM

## 2021-05-23 DIAGNOSIS — R80.9 TYPE 2 DIABETES MELLITUS WITH MICROALBUMINURIA, WITHOUT LONG-TERM CURRENT USE OF INSULIN (H): ICD-10-CM

## 2021-05-24 RX ORDER — GLIPIZIDE 5 MG/1
TABLET ORAL
Qty: 360 TABLET | Refills: 1 | Status: SHIPPED | OUTPATIENT
Start: 2021-05-24 | End: 2021-09-21

## 2021-05-27 VITALS — SYSTOLIC BLOOD PRESSURE: 153 MMHG | DIASTOLIC BLOOD PRESSURE: 96 MMHG | HEART RATE: 61 BPM

## 2021-05-27 VITALS — HEART RATE: 99 BPM | SYSTOLIC BLOOD PRESSURE: 148 MMHG | DIASTOLIC BLOOD PRESSURE: 100 MMHG

## 2021-05-27 VITALS — HEART RATE: 86 BPM | SYSTOLIC BLOOD PRESSURE: 137 MMHG | DIASTOLIC BLOOD PRESSURE: 80 MMHG

## 2021-05-30 VITALS — WEIGHT: 201 LBS | BODY MASS INDEX: 33.97 KG/M2

## 2021-05-30 VITALS — WEIGHT: 202 LBS | BODY MASS INDEX: 33.66 KG/M2 | HEIGHT: 65 IN

## 2021-05-30 NOTE — TELEPHONE ENCOUNTER
RN cannot approve Refill Request    RN can NOT refill this medication Protocol failed and NO refill given.       Jeni Browning, [facility] Triage/Med Refill 7/29/2019    Requested Prescriptions   Pending Prescriptions Disp Refills     diltiazem (CARDIZEM CD) 240 MG 24 hr capsule [Pharmacy Med Name: DILTIAZEM 24H ER(CD) 240 MG CP] 90 capsule 2     Sig: TAKE 1 CAPSULE (240 MG TOTAL) BY MOUTH DAILY.       Calcium-Channel Blockers Protocol Failed - 7/29/2019  1:46 AM        Failed - PCP or prescribing provider visit in past 12 months or next 3 months     Last office visit with prescriber/PCP: 7/24/2018 Grover Madrigal MD OR same dept: Visit date not found OR same specialty: 7/24/2018 Grover Madrigal MD  Last physical: Visit date not found Last MTM visit: Visit date not found   Next visit within 3 mo: Visit date not found  Next physical within 3 mo: Visit date not found  Prescriber OR PCP: Grover Madrigal MD  Last diagnosis associated with med order: 1. Essential hypertension  - diltiazem (CARDIZEM CD) 240 MG 24 hr capsule [Pharmacy Med Name: DILTIAZEM 24H ER(CD) 240 MG CP]; TAKE 1 CAPSULE (240 MG TOTAL) BY MOUTH DAILY.  Dispense: 90 capsule; Refill: 2    2. Type 2 diabetes mellitus without complication (H)  - metFORMIN (GLUCOPHAGE-XR) 500 MG 24 hr tablet [Pharmacy Med Name: METFORMIN HCL  MG TABLET]; TAKE 2 TABLETS BY MOUTH TWICE A DAY WITH MEALS  Dispense: 360 tablet; Refill: 1    If protocol passes may refill for 12 months if within 3 months of last provider visit (or a total of 15 months).             Failed - Blood pressure filed in past 12 months     BP Readings from Last 1 Encounters:   07/24/18 136/88             metFORMIN (GLUCOPHAGE-XR) 500 MG 24 hr tablet [Pharmacy Med Name: METFORMIN HCL  MG TABLET] 360 tablet 1     Sig: TAKE 2 TABLETS BY MOUTH TWICE A DAY WITH MEALS       Metformin Refill Protocol Failed - 7/29/2019  1:46 AM        Failed - Blood pressure in last 12 months     BP Readings  from Last 1 Encounters:   07/24/18 136/88             Failed - LFT or AST or ALT in last 12 months     Albumin   Date Value Ref Range Status   07/24/2018 4.1 3.5 - 5.0 g/dL Final     Bilirubin, Total   Date Value Ref Range Status   07/24/2018 0.9 0.0 - 1.0 mg/dL Final     Alkaline Phosphatase   Date Value Ref Range Status   07/24/2018 62 45 - 120 U/L Final     AST   Date Value Ref Range Status   07/24/2018 39 0 - 40 U/L Final     ALT   Date Value Ref Range Status   07/24/2018 43 0 - 45 U/L Final     Protein, Total   Date Value Ref Range Status   07/24/2018 8.0 6.0 - 8.0 g/dL Final                Failed - GFR or Serum Creatinine in last 6 months     GFR MDRD Non Af Amer   Date Value Ref Range Status   07/24/2018 >60 >60 mL/min/1.73m2 Final     GFR MDRD Af Amer   Date Value Ref Range Status   07/24/2018 >60 >60 mL/min/1.73m2 Final             Failed - Visit with PCP or prescribing provider visit in last 6 months or next 3 months     Last office visit with prescriber/PCP: Visit date not found OR same dept: Visit date not found OR same specialty: 7/24/2018 Grover Madrigal MD Last physical: Visit date not found Last MTM visit: Visit date not found         Next appt within 3 mo: Visit date not found  Next physical within 3 mo: Visit date not found  Prescriber OR PCP: Grover Madrigal MD  Last diagnosis associated with med order: 1. Essential hypertension  - diltiazem (CARDIZEM CD) 240 MG 24 hr capsule [Pharmacy Med Name: DILTIAZEM 24H ER(CD) 240 MG CP]; TAKE 1 CAPSULE (240 MG TOTAL) BY MOUTH DAILY.  Dispense: 90 capsule; Refill: 2    2. Type 2 diabetes mellitus without complication (H)  - metFORMIN (GLUCOPHAGE-XR) 500 MG 24 hr tablet [Pharmacy Med Name: METFORMIN HCL  MG TABLET]; TAKE 2 TABLETS BY MOUTH TWICE A DAY WITH MEALS  Dispense: 360 tablet; Refill: 1     If protocol passes may refill for 12 months if within 3 months of last provider visit (or a total of 15 months).           Failed - A1C in last 6 months      Hemoglobin A1c   Date Value Ref Range Status   07/24/2018 7.8 (H) 3.5 - 6.0 % Final               Failed - Microalbumin in last year      Microalbumin, Random Urine   Date Value Ref Range Status   07/24/2018 1.39 0.00 - 1.99 mg/dL Final

## 2021-05-31 VITALS — WEIGHT: 191 LBS | HEIGHT: 64 IN | BODY MASS INDEX: 32.61 KG/M2

## 2021-05-31 VITALS — BODY MASS INDEX: 33.12 KG/M2 | WEIGHT: 194 LBS | HEIGHT: 64 IN

## 2021-05-31 VITALS — HEIGHT: 64 IN | BODY MASS INDEX: 32.95 KG/M2 | WEIGHT: 193 LBS

## 2021-05-31 NOTE — TELEPHONE ENCOUNTER
Please contact this patient.  He needs to be seen for an office visit with me, to follow-up on hypertension

## 2021-05-31 NOTE — TELEPHONE ENCOUNTER
RN cannot approve Refill Request    RN can NOT refill this medication PCP messaged that patient is overdue for Labs and Office Visit. Last office visit: 7/24/2018 Grover Madrigal MD Last Physical: Visit date not found Last MTM visit: Visit date not found Last visit same specialty: 7/24/2018 Grover Madrigal MD.  Next visit within 3 mo: Visit date not found  Next physical within 3 mo: Visit date not found      Dulce Maria CORTES Rosendo, [facility] Triage/Med Refill 8/16/2019    Requested Prescriptions   Pending Prescriptions Disp Refills     lisinopril (PRINIVIL,ZESTRIL) 40 MG tablet [Pharmacy Med Name: LISINOPRIL 40 MG TABLET] 90 tablet 3     Sig: TAKE 1 TABLET BY MOUTH EVERY DAY       Ace Inhibitors Refill Protocol Failed - 8/16/2019 10:21 AM        Failed - PCP or prescribing provider visit in past 12 months       Last office visit with prescriber/PCP: 7/24/2018 Grover Madrigal MD OR same dept: Visit date not found OR same specialty: 7/24/2018 Grover Madrigal MD  Last physical: Visit date not found Last MTM visit: Visit date not found   Next visit within 3 mo: Visit date not found  Next physical within 3 mo: Visit date not found  Prescriber OR PCP: Grover Madrigal MD  Last diagnosis associated with med order: 1. Essential hypertension  - lisinopril (PRINIVIL,ZESTRIL) 40 MG tablet [Pharmacy Med Name: LISINOPRIL 40 MG TABLET]; TAKE 1 TABLET BY MOUTH EVERY DAY  Dispense: 90 tablet; Refill: 3    If protocol passes may refill for 12 months if within 3 months of last provider visit (or a total of 15 months).             Failed - Serum Potassium in past 12 months     No results found for: LN-POTASSIUM          Failed - Blood pressure filed in past 12 months     BP Readings from Last 1 Encounters:   07/24/18 136/88             Failed - Serum Creatinine in past 12 months     Creatinine   Date Value Ref Range Status   07/24/2018 1.08 0.70 - 1.30 mg/dL Final

## 2021-06-01 VITALS — WEIGHT: 192 LBS | BODY MASS INDEX: 32.7 KG/M2

## 2021-06-01 NOTE — PROGRESS NOTES
Subjective: This patient comes in for follow-up.  This patient is a 73-year-old male has hypertension hyperlipidemia and diabetes.    Patient sugars at home been in the 1 20-1 40 range she states.    He is on metformin 500 mg 2 twice a day    He takes lisinopril and diltiazem for blood pressure.    He has not been in since July 2018.    He is on an aspirin a day as well and takes Lipitor for his lipids.    Labs today include A1c CMP lipid and microalbumin.    He does not want any immunizations does not want any screening with colonoscopy or fecal Hemoccults or Cologuard.    He does not want any checking on his prostate or blood work regarding that.    Tobacco status: He  reports that he has never smoked. He has never used smokeless tobacco.    Patient Active Problem List    Diagnosis Date Noted     Type 2 diabetes mellitus with microalbuminuria, without long-term current use of insulin (H) 09/05/2019     Microalbuminuria 07/24/2018     Exertional dyspnea 11/16/2016     Type 2 diabetes mellitus, uncontrolled (H) 08/21/2015     Essential hypertension 08/21/2015     Environmental allergies 08/21/2015     Hyperlipidemia 08/21/2015     BPH (benign prostatic hyperplasia) 08/21/2015       Current Outpatient Medications   Medication Sig Dispense Refill     aspirin 81 MG EC tablet Take 1 tablet (81 mg total) by mouth daily. 90 tablet 3     atorvastatin (LIPITOR) 40 MG tablet Take 1 tablet (40 mg total) by mouth daily. 90 tablet 3     blood glucose test (CONTOUR TEST STRIPS) strips TEST BLOOD SUGAR TWICE DAILY 100 strip 1     diltiazem (CARDIZEM CD) 240 MG 24 hr capsule Take 1 capsule (240 mg total) by mouth daily. 90 capsule 3     generic lancets (FINGERSTIX LANCETS) Check blood sugar twice daily.  Uses Insulin.  Diagnosis E11.9 100 each 1     lisinopril (PRINIVIL,ZESTRIL) 40 MG tablet Take 1 tablet (40 mg total) by mouth daily. 90 tablet 3     loratadine (CLARITIN) 10 mg tablet Take 1 tablet (10 mg total) by mouth daily. 90  "tablet 3     metFORMIN (GLUCOPHAGE-XR) 500 MG 24 hr tablet TAKE 2 TABLETS BY MOUTH TWICE A DAY WITH MEALS 360 tablet 3     pen needle, diabetic 32 gauge x 1/4\" Ndle Use as needed with insulin 100 each 1     No current facility-administered medications for this visit.        ROS:   10 point review of systems positive as outlined above otherwise negative.  He feels like he is doing well denies problems.    Objective:    /84 (Patient Site: Left Arm, Patient Position: Sitting, Cuff Size: Adult Regular)   Pulse 76   Resp 16   Ht 5' 4\" (1.626 m)   Wt 194 lb (88 kg)   BMI 33.30 kg/m    Body mass index is 33.3 kg/m .      General appearance no acute distress recheck blood pressure 132/84 vital signs stable weight is stable    BMI is elevated though at 33.  I discussed increasing  HEENT canals and TMs normal oropharynx clear pupils react normally extract movements full  Lungs are clear no rales or rhonchi, heart was regular S1-S2 no murmur rate in the 70s.    Lower extremities without edema pulses normal and sensation normal.    No joint redness warmth or swelling.    A1c is up to 8.2, CMP lipid and microalbumin pending    Results for orders placed or performed in visit on 09/05/19   Glycosylated Hemoglobin A1c   Result Value Ref Range    Hemoglobin A1c 8.2 (H) 3.5 - 6.0 %       Assessment:  1. Type 2 diabetes mellitus with microalbuminuria, without long-term current use of insulin (H)  Glycosylated Hemoglobin A1c    Microalbumin, Random Urine    Comprehensive Metabolic Panel   2. Essential hypertension  Comprehensive Metabolic Panel    diltiazem (CARDIZEM CD) 240 MG 24 hr capsule    lisinopril (PRINIVIL,ZESTRIL) 40 MG tablet   3. Mixed hyperlipidemia  Comprehensive Metabolic Panel    Lipid Cascade FASTING   4. Microalbuminuria     5. Type 2 diabetes mellitus without complication (H)  aspirin 81 MG EC tablet    metFORMIN (GLUCOPHAGE-XR) 500 MG 24 hr tablet   6. Hyperlipidemia  atorvastatin (LIPITOR) 40 MG tablet "     Diabetes mellitus suboptimal control he needs to work harder on diet and exercise recheck in 3 to 4 months    Hypertension controlled continue same    Hyperlipidemia await results    Continue lisinopril for microalbumin urea    Plan: As outlined above patient refuses preventative care recommendations.    This transcription uses voice recognition software, which may contain typographical errors.

## 2021-06-01 NOTE — TELEPHONE ENCOUNTER
----- Message from Grover Madrigal MD sent at 9/9/2019  4:54 PM CDT -----  Please contact this patient's family, let them know that the results show the diabetes was a little worse.  Needs to work harder on diet, decreasing the carbohydrates less rice bread sugar [place].    Rest of the test looked okay including liver kidney electrolytes cholesterol and urine.    Follow-up in 3 months

## 2021-06-03 VITALS
HEIGHT: 64 IN | HEART RATE: 76 BPM | SYSTOLIC BLOOD PRESSURE: 132 MMHG | RESPIRATION RATE: 16 BRPM | BODY MASS INDEX: 33.12 KG/M2 | WEIGHT: 194 LBS | DIASTOLIC BLOOD PRESSURE: 84 MMHG

## 2021-06-04 VITALS
HEART RATE: 84 BPM | DIASTOLIC BLOOD PRESSURE: 86 MMHG | SYSTOLIC BLOOD PRESSURE: 138 MMHG | OXYGEN SATURATION: 95 % | BODY MASS INDEX: 33.99 KG/M2 | TEMPERATURE: 98.6 F | WEIGHT: 198 LBS

## 2021-06-04 VITALS
SYSTOLIC BLOOD PRESSURE: 146 MMHG | RESPIRATION RATE: 16 BRPM | WEIGHT: 191 LBS | DIASTOLIC BLOOD PRESSURE: 94 MMHG | HEART RATE: 81 BPM | BODY MASS INDEX: 32.79 KG/M2

## 2021-06-04 NOTE — TELEPHONE ENCOUNTER
Medication Request  Medication name:   tamsulosin (FLOMAX) 0.4 mg Cp24 (Discontinued) 90 capsule 1 1/8/2016 6/16/2016 No   Sig - Route: Take 1 capsule (0.4 mg total) by mouth daily after supper. - Oral   Reason for Discontinue: Therapy completed   E-Prescribing Status: Receipt confirmed by pharmacy (1/8/2016  9:36 AM CST)        Pharmacy Name and Location: [id number]  Reason for request: pharmacy faxed in request stating patient is requesting this.  When did you use medication last?:  This was discontinued in 2016  Patient offered appointment:  request came via fax  Okay to leave a detailed message: yes

## 2021-06-04 NOTE — TELEPHONE ENCOUNTER
Refill Approved    Rx renewed per Medication Renewal Policy. Medication was last renewed on 8/22/18.    Ana Castanon, [facility] Triage/Med Refill 12/23/2019     Requested Prescriptions   Pending Prescriptions Disp Refills     ALLERGY RELIEF, LORATADINE, 10 mg tablet [Pharmacy Med Name: [facility] LORATADINE 10 MG TABLET] 90 tablet 3     Sig: TAKE 1 TABLET BY MOUTH EVERY DAY - **OTC ITEM - DRUG NOT COVERED--USE DISCOUNT CARD**       Antihistamine Refill Protocol Passed - 12/19/2019  1:52 PM        Passed - Patient has had office visit/physical in last year     Last office visit with prescriber/PCP: 9/5/2019 Grover Madrigal MD OR same dept: 9/5/2019 Grover Madrigal MD OR same specialty: 9/5/2019 Grover Madrigal MD  Last physical: Visit date not found Last MTM visit: Visit date not found   Next visit within 3 mo: Visit date not found  Next physical within 3 mo: Visit date not found  Prescriber OR PCP: Grover Madrigal MD  Last diagnosis associated with med order: 1. Environmental allergies  - ALLERGY RELIEF, LORATADINE, 10 mg tablet [Pharmacy Med Name: [facility] LORATADINE 10 MG TABLET]; TAKE 1 TABLET BY MOUTH EVERY DAY - **OTC ITEM - DRUG NOT COVERED--USE DISCOUNT CARD**  Dispense: 90 tablet; Refill: 3    If protocol passes may refill for 12 months if within 3 months of last provider visit (or a total of 15 months).

## 2021-06-04 NOTE — TELEPHONE ENCOUNTER
Patient should be seen to discuss restarting this with his additional other medical conditions.    He can probably wait till his next appointment to follow-up on diabetes, or schedule a sooner appointment if he feels is necessary

## 2021-06-05 NOTE — TELEPHONE ENCOUNTER
----- Message from Grover Madrigal MD sent at 1/9/2020  5:08 PM CST -----  Please contact this patient, let her know the diabetes is worse and the A1c is up to 9.2.  He should stay on the same metformin 500 mg 2 twice a day, but I also added glipizide 5 mg 1 pill 2 times a day with meals as well.    I should see him back for recheck in 6 weeks

## 2021-06-05 NOTE — PROGRESS NOTES
Subjective: Patient comes in for evaluation.  He was frustrated today as he waited a long time before getting called back his blood pressure was elevated initially recheck still little bit up.  He does take lisinopril and diltiazem he thinks the blood pressures are from that it did not want to increase medication or change medicine at this time.  We will follow that.    He has diabetes.  His last A1c was 8.2 in September his sugars are in the 1 50-1 60 range.  He is on metformin 500 mg 2 twice a day.    GFR was 60 ALT 47 microalbumin was negative.    At that time     He needed a refill on his tamsulosin he does have some BPH symptoms.    No additional concerns or issues    Tobacco status: He  reports that he has never smoked. He has never used smokeless tobacco.    Patient Active Problem List    Diagnosis Date Noted     Type 2 diabetes mellitus with microalbuminuria, without long-term current use of insulin (H) 09/05/2019     Microalbuminuria 07/24/2018     Exertional dyspnea 11/16/2016     Type 2 diabetes mellitus, uncontrolled (H) 08/21/2015     Essential hypertension 08/21/2015     Environmental allergies 08/21/2015     Hyperlipidemia 08/21/2015     BPH (benign prostatic hyperplasia) 08/21/2015       Current Outpatient Medications   Medication Sig Dispense Refill     ALLERGY RELIEF, LORATADINE, 10 mg tablet TAKE 1 TABLET BY MOUTH EVERY DAY - **OTC ITEM - DRUG NOT COVERED--USE DISCOUNT CARD** 90 tablet 2     aspirin 81 MG EC tablet Take 1 tablet (81 mg total) by mouth daily. 90 tablet 3     atorvastatin (LIPITOR) 40 MG tablet Take 1 tablet (40 mg total) by mouth daily. 90 tablet 3     blood glucose test (CONTOUR TEST STRIPS) strips TEST BLOOD SUGAR TWICE DAILY 100 strip 1     diltiazem (CARDIZEM CD) 240 MG 24 hr capsule Take 1 capsule (240 mg total) by mouth daily. 90 capsule 3     generic lancets (FINGERSTIX LANCETS) Check blood sugar twice daily.  Uses Insulin.  Diagnosis E11.9 100 each 1     lisinopril  "(PRINIVIL,ZESTRIL) 40 MG tablet Take 1 tablet (40 mg total) by mouth daily. 90 tablet 3     metFORMIN (GLUCOPHAGE-XR) 500 MG 24 hr tablet TAKE 2 TABLETS BY MOUTH TWICE A DAY WITH MEALS 360 tablet 3     pen needle, diabetic 32 gauge x 1/4\" Ndle Use as needed with insulin 100 each 1     glipiZIDE (GLUCOTROL) 5 MG tablet 1 p.o. twice daily with meals 60 tablet 3     tamsulosin (FLOMAX) 0.4 mg cap Take 1 capsule (0.4 mg total) by mouth daily after supper. 90 capsule 3     No current facility-administered medications for this visit.        ROS:   10 point review of systems positive as outlined above otherwise negative    Objective:    BP (!) 146/94 (Patient Site: Left Arm, Patient Position: Sitting, Cuff Size: Adult Large)   Pulse 81   Resp 16   Wt 191 lb (86.6 kg)   BMI 32.79 kg/m    Body mass index is 32.79 kg/m .      General appearance no acute distress    HEENT neck supple oropharynx clear pupils react normally    Lungs clear no rales or rhonchi heart was regular S1-S2.    Lower extremities without edema.  Abdomen nontender no guarding or rebound    Skin was normal no rashes    No joint redness warmth or swelling.    Labs A1c is up to 9.2    Results for orders placed or performed in visit on 01/09/20   Glycosylated Hemoglobin A1c   Result Value Ref Range    Hemoglobin A1c 9.2 (H) 3.5 - 6.0 %       Assessment:  1. Type 2 diabetes mellitus with microalbuminuria, without long-term current use of insulin (H)  Glycosylated Hemoglobin A1c    glipiZIDE (GLUCOTROL) 5 MG tablet   2. Essential hypertension     3. Mixed hyperlipidemia     4. Benign prostatic hyperplasia, unspecified whether lower urinary tract symptoms present  tamsulosin (FLOMAX) 0.4 mg cap     Recheck in 6 to 8 weeks.    Hypertension suboptimal control please see above discussion we will monitor he will stay on the diltiazem and lisinopril.    Hyperlipidemia controlled on Lipitor    BPH refill on tamsulosin.    Plan: As discussed above    This " transcription uses voice recognition software, which may contain typographical errors.

## 2021-06-05 NOTE — TELEPHONE ENCOUNTER
Called and spoke with Vashti King , Message was given, Vashti King  understood, no further questions.  Appointment made to come in.

## 2021-06-06 NOTE — TELEPHONE ENCOUNTER
Refill Approved    Rx renewed per Medication Renewal Policy. Medication was last renewed on 1/9/20.    Jeni Browning, [facility] Triage/Med Refill 3/12/2020     Requested Prescriptions   Pending Prescriptions Disp Refills     glipiZIDE (GLUCOTROL) 5 MG tablet [Pharmacy Med Name: GLIPIZIDE 5 MG TABLET] 180 tablet 1     Sig: TAKE 1 TABLET BY MOUTH TWICE DAILY WITH MEALS       Oral Hypoglycemics Refill Protocol Passed - 3/8/2020  9:58 AM        Passed - Visit with PCP or prescribing provider visit in last 6 months       Last office visit with prescriber/PCP: 2/24/2020 OR same dept: 2/24/2020 Grover Madrigal MD OR same specialty: 2/24/2020 Grover Madrigal MD Last physical: Visit date not found Last MTM visit: Visit date not found         Next appt within 3 mo: Visit date not found  Next physical within 3 mo: Visit date not found  Prescriber OR PCP: Grover Madrigal MD  Last diagnosis associated with med order: 1. Type 2 diabetes mellitus with microalbuminuria, without long-term current use of insulin (H)  - glipiZIDE (GLUCOTROL) 5 MG tablet [Pharmacy Med Name: GLIPIZIDE 5 MG TABLET]; TAKE 1 TABLET BY MOUTH TWICE DAILY WITH MEALS  Dispense: 180 tablet; Refill: 1     If protocol passes may refill for 12 months if within 3 months of last provider visit (or a total of 15 months).           Passed - A1C in last 6 months     Hemoglobin A1c   Date Value Ref Range Status   02/24/2020 8.4 (H) 3.5 - 6.0 % Final               Passed - Microalbumin in last year      Microalbumin, Random Urine   Date Value Ref Range Status   09/05/2019 0.91 0.00 - 1.99 mg/dL Final                  Passed - Blood pressure in last year     BP Readings from Last 1 Encounters:   02/24/20 138/86             Passed - Serum creatinine in last year     Creatinine   Date Value Ref Range Status   09/05/2019 1.19 0.70 - 1.30 mg/dL Final

## 2021-06-06 NOTE — TELEPHONE ENCOUNTER
----- Message from Grover Madrigal MD sent at 2/27/2020  1:31 PM CST -----  Please contact this patient, let him know that the diabetes is better the A1c 6 weeks ago was 9.2 it is down to 8.4.  Continue on the same medicines including the metformin and the glipizide.    I should see him back for recheck in 6 to 8 weeks.    Also let him know the hepatitis C was negative

## 2021-06-06 NOTE — TELEPHONE ENCOUNTER
Called pt with [facility]  J Luis:  ID: [id number]. Spoke with pt and stated his diabetes is better the A1c 6 weeks ago was 9.2 it is down to 8.4.  Continue on the same medicines including the metformin and the glipizide.     Dr. Madrigal would like to see him back for recheck in 6 to 8 weeks.     Also let him know the hepatitis C was negative.    Pt stated he will call to schedule an appt in 6 wks.   Pt stated understanding and no questions or concerns.

## 2021-06-06 NOTE — PROGRESS NOTES
Subjective: This patient comes in for follow-up on his diabetes and other medical conditions he has hyperlipidemia and hypertension    On January 9 A1c was 9.3.  Patient is on metformin 500 mg 2 twice a day I added glipizide 5 mg 1 twice daily.    His sugars are now in the  range.    We do not have the eye exam recorded since 2017.  He stated he was just at the [facility] eye clinic, [address].    We will call over there to get the results.    I did do a foot exam he has normal sensation there is no ulcerations normal pulses.    Last LDL was 108 back in September and microalbumin was negative.    He continues on atorvastatin 40 mg a day.    Blood pressures controlled with lisinopril and diltiazem.    Immunization update with flu shot and Shingrix No. 1.    Screening tests: Hep C also done today    Tobacco status: He  reports that he has never smoked. He has never used smokeless tobacco.    Patient Active Problem List    Diagnosis Date Noted     Type 2 diabetes mellitus with microalbuminuria, without long-term current use of insulin (H) 09/05/2019     Microalbuminuria 07/24/2018     Exertional dyspnea 11/16/2016     Type 2 diabetes mellitus, uncontrolled (H) 08/21/2015     Essential hypertension 08/21/2015     Environmental allergies 08/21/2015     Hyperlipidemia 08/21/2015     BPH (benign prostatic hyperplasia) 08/21/2015       Current Outpatient Medications   Medication Sig Dispense Refill     ALLERGY RELIEF, LORATADINE, 10 mg tablet TAKE 1 TABLET BY MOUTH EVERY DAY - **OTC ITEM - DRUG NOT COVERED--USE DISCOUNT CARD** 90 tablet 2     aspirin 81 MG EC tablet Take 1 tablet (81 mg total) by mouth daily. 90 tablet 3     atorvastatin (LIPITOR) 40 MG tablet Take 1 tablet (40 mg total) by mouth daily. 90 tablet 3     blood glucose test (CONTOUR TEST STRIPS) strips TEST BLOOD SUGAR TWICE DAILY 100 strip 1     diltiazem (CARDIZEM CD) 240 MG 24 hr capsule Take 1 capsule (240 mg total) by mouth daily. 90 capsule 3     generic  "lancets (FINGERSTIX LANCETS) Check blood sugar twice daily.  Uses Insulin.  Diagnosis E11.9 100 each 1     glipiZIDE (GLUCOTROL) 5 MG tablet 1 p.o. twice daily with meals 60 tablet 3     lisinopril (PRINIVIL,ZESTRIL) 40 MG tablet Take 1 tablet (40 mg total) by mouth daily. 90 tablet 3     metFORMIN (GLUCOPHAGE-XR) 500 MG 24 hr tablet TAKE 2 TABLETS BY MOUTH TWICE A DAY WITH MEALS 360 tablet 3     pen needle, diabetic 32 gauge x 1/4\" Ndle Use as needed with insulin 100 each 1     tamsulosin (FLOMAX) 0.4 mg cap Take 1 capsule (0.4 mg total) by mouth daily after supper. 90 capsule 3     No current facility-administered medications for this visit.        ROS:   10 point review of systems positive as outlined above otherwise negative    Objective:    /86 (Patient Site: Left Arm, Patient Position: Sitting, Cuff Size: Adult Regular)   Pulse 84   Temp 98.6  F (37  C) (Oral)   Wt 198 lb (89.8 kg)   SpO2 95%   BMI 33.99 kg/m    Body mass index is 33.99 kg/m .      General appearance no acute distress    HEENT neck is negative no adenopathy oropharynx is clear pupils react normally    Lungs were clear throughout no rales or rhonchi O2 sat 95%    Heart was regular rate in the 80s no murmur.    Abdomen nontender no guarding or rebound no masses.    Lower extremities without edema, normal sensation normal pulses    No skin changes no ulcerations or worrisome lesions.    A1c hepatitis C and HIV pending    Results for orders placed or performed in visit on 01/09/20   Glycosylated Hemoglobin A1c   Result Value Ref Range    Hemoglobin A1c 9.2 (H) 3.5 - 6.0 %       Assessment:  1. Type 2 diabetes mellitus with microalbuminuria, without long-term current use of insulin (H)   DIABETES FOOT EXAM    Glycosylated Hemoglobin A1c   2. Screening for viral disease  Hepatitis C Antibody (Anti-HCV)   3. Immunization due  Varicella Zoster, Recombinant Vaccine IM    Influenza High Dose,Seasonal,PF 65+ Yrs   4. Colon cancer screening  " Cologuard   5. Essential hypertension     6. Mixed hyperlipidemia     7. Microalbuminuria       Diabetes mellitus seems to be under better control now with the addition of glipizide to the metformin await results contact patient plan to recheck in 3 months    Screening for hep C.    Immunization update with first Shingrix shot and a flu shot    Follow-up from 2 to 6 months for the next flu Shingrix shot.        Plan: In addition discussed colon cancer screening he will fill out Cologuard.    I will contact him regarding the hepatitis C and A1c    This transcription uses voice recognition software, which may contain typographical errors.

## 2021-06-08 NOTE — PROGRESS NOTES
Hearing Aid Fitting    The patient was seen today for a hearing aid fitting.  He has been medically cleared for devices from Dr. Davis. He was fit with bilateral Phonak Nena V50-SP devices and custom Unitron skeleton earmolds.   Real ear measurements revealed appropriate audibility and output when using NAL-NL2 targets. The aids were set at 90% gain during programming. The overall gain was then decreased to 70% for patient comfort and it is set to automatically increase to 80% gain over the next month.    Hearing aid:  : Phonak  Devices:  Nena V50-SP   Style:  BTE  Serial numbers:  [id number]  Batteries:  size 13. Patient was provided 32 batteries today.  Warranty expiration:  1/20/2020    Otoscopy reveals no occlusions, bilaterally.  The patient reports satisfaction with the fit and sound quality of the instruments.  Use, care, trial period and realistic expectations were reviewed in detail. The push button was deactivated. The volume toggle was activated  He is able to demonstrate independent manipulation of the instruments with battery care and insertion/removal.  He is given written instruction on use, care, and warranty.  He sets up a follow up appointment in 4 weeks.    The patient verbalized understanding and is in agreement with this plan.    Paul Yusuf., CCC-A  [place] Licensed Audiologist #[id number]

## 2021-06-08 NOTE — TELEPHONE ENCOUNTER
Called and spoke with Vashti King , Message was given, Vashti King  understood, no further questions.

## 2021-06-08 NOTE — PROGRESS NOTES
"Vashti King is a 74 y.o. male who is being evaluated via a billable telephone visit.      The patient has been notified of following:     \"This telephone visit will be conducted via a call between you and your physician/provider. We have found that certain health care needs can be provided without the need for a physical exam.  This service lets us provide the care you need with a short phone conversation.  If a prescription is necessary we can send it directly to your pharmacy.  If lab work is needed we can place an order for that and you can then stop by our lab to have the test done at a later time.    Telephone visits are billed at different rates depending on your insurance coverage. During this emergency period, for some insurers they may be billed the same as an in-person visit.  Please reach out to your insurance provider with any questions.    If during the course of the call the physician/provider feels a telephone visit is not appropriate, you will not be charged for this service.\"    Patient has given verbal consent to a Telephone visit? Yes    Additional provider notes:     Subjective:    This patient had a telephone visit, virtual visit, because of the coronavirus pandemic.    This patient was last seen back in February 2020.  A1c at that time was 8.4 in January was 9.2    He is doing better now on the glipizide metformin he is taking it regularly.  Sugars at home in the 130 range.    His blood pressures been good 130-137/80.    He continues on atorvastatin for lipids he is due for recheck on that.    Blood pressures been controlled with lisinopril 40 mg as well as diltiazem 240 mg daily.  Also on tamsulosin and an aspirin a day.    Denies any numbness in his feet or sores.  He has had no COVID-19 exposures or symptoms.  Denies any fever cough shortness of breath.    Had a negative Cologuard in March.    He did get a shingles shot #1 back in February, unfortunately his insurance did not cover that it " "cost $300.  He is in a check on insurance before he gets the neck shot.    Otherwise no complaints or concerns.  Med reconciliation as outlined above.    Tobacco status: He  reports that he has never smoked. He has never used smokeless tobacco.    Patient Active Problem List    Diagnosis Date Noted     Type 2 diabetes mellitus with microalbuminuria, without long-term current use of insulin (H) 09/05/2019     Microalbuminuria 07/24/2018     Exertional dyspnea 11/16/2016     Type 2 diabetes mellitus, uncontrolled (H) 08/21/2015     Essential hypertension 08/21/2015     Environmental allergies 08/21/2015     Hyperlipidemia 08/21/2015     BPH (benign prostatic hyperplasia) 08/21/2015       Current Outpatient Medications   Medication Sig Dispense Refill     ALLERGY RELIEF, LORATADINE, 10 mg tablet TAKE 1 TABLET BY MOUTH EVERY DAY - **OTC ITEM - DRUG NOT COVERED--USE DISCOUNT CARD** 90 tablet 2     aspirin 81 MG EC tablet Take 1 tablet (81 mg total) by mouth daily. 90 tablet 3     atorvastatin (LIPITOR) 40 MG tablet TAKE 1 TABLET BY MOUTH EVERY DAY 90 tablet 3     blood glucose test (CONTOUR TEST STRIPS) strips TEST BLOOD SUGAR TWICE DAILY 100 strip 1     diltiazem (CARDIZEM CD) 240 MG 24 hr capsule Take 1 capsule (240 mg total) by mouth daily. 90 capsule 3     generic lancets (FINGERSTIX LANCETS) Check blood sugar twice daily.  Uses Insulin.  Diagnosis E11.9 100 each 1     glipiZIDE (GLUCOTROL) 5 MG tablet TAKE 1 TABLET BY MOUTH TWICE DAILY WITH MEALS 180 tablet 1     lisinopril (PRINIVIL,ZESTRIL) 40 MG tablet Take 1 tablet (40 mg total) by mouth daily. 90 tablet 3     metFORMIN (GLUCOPHAGE-XR) 500 MG 24 hr tablet TAKE 2 TABLETS BY MOUTH TWICE A DAY WITH MEALS 360 tablet 3     pen needle, diabetic 32 gauge x 1/4\" Ndle Use as needed with insulin 100 each 1     tamsulosin (FLOMAX) 0.4 mg cap Take 1 capsule (0.4 mg total) by mouth daily after supper. 90 capsule 3     No current facility-administered medications for this " visit.        ROS:   10 point review of systems positive as outlined above otherwise negative    Objective:    /80 Comment: Patient reported  Pulse 86 Comment: Patient reported  There is no height or weight on file to calculate BMI.      There was no visual exam because of a telephone visit.    He denies any swelling or numbness or tingling in his legs.  No rash through the skin.    Lungs, nonlabored breathing no shortness of breath or wheezing.    Heart: No palpitations or heart racing.    Denies any coloration to his skin no jaundice change no scleral icterus.    Denies any significant joint pain swelling or redness in the joints.    No swelling in the lower extremities.    Lab only CMP A1c and lipid    Results for orders placed or performed in visit on 03/02/20   Cologuard External Result   Result Value Ref Range    COLOGUARD_EXT Negative        Assessment:  1. Type 2 diabetes mellitus with microalbuminuria, without long-term current use of insulin (H)  Comprehensive Metabolic Panel    Glycosylated Hemoglobin A1c   2. Essential hypertension  Comprehensive Metabolic Panel   3. Mixed hyperlipidemia  Comprehensive Metabolic Panel    Lipid [place] FASTING   4. Microalbuminuria     5. Immunization due     6. Benign prostatic hyperplasia, unspecified whether lower urinary tract symptoms present     7. Type 2 diabetes mellitus without complication (H)  generic lancets (FINGERSTIX LANCETS)    blood glucose test (CONTOUR TEST STRIPS) strips     Diabetes mellitus, await A1c looks like his sugars have been in a good range.  He is on metformin and glipizide.    Hypertension continue lisinopril and diltiazem blood pressures been normal.    History of microalbuminuria.    BPH continues on tamsulosin.    Needed refill on his test strips.    Due for second shingles shot but wants to check on coverage before he does that.        Plan: Patient will come in for lab only and will contact patient with results and discuss  follow-up    This transcription uses voice recognition software, which may contain typographical errors.          Phone call duration: 14 minutes, from 1:52 PM through 2:06 PM    Grover Madrigal MD

## 2021-06-08 NOTE — PROGRESS NOTES
Subjective: Patient comes in for evaluation is a 71-year-old male.  Patient has diabetes hypertension hyperlipidemia.    He's been on Bernard Lamberth lisinopril hydrochlorothiazide glipizide and aspirin    He hadn't been taking his Lipitor regularly    LDL was 176 we discussed getting on this regularly rechecking in 6 weeks check lipid AST ALT    Also regarding his diabetes his A1c was 9.0 on January 16 he is on glipizide 5 mg 1 twice a day for the last month I added metformin 500 mg 1 twice a day.  He has normal renal function.    Patient had a living will, this was reviewed he is a full code.    Please see the full healthcare directive.    Tobacco status: He  reports that he has never smoked. He has never used smokeless tobacco.    Patient Active Problem List    Diagnosis Date Noted     Exertional dyspnea 11/16/2016     Type 2 diabetes mellitus, uncontrolled 08/21/2015     Essential hypertension 08/21/2015     Environmental allergies 08/21/2015     Hyperlipidemia 08/21/2015     BPH (benign prostatic hyperplasia) 08/21/2015       Current Outpatient Prescriptions   Medication Sig Dispense Refill     aspirin 81 MG EC tablet Take 1 tablet (81 mg total) by mouth daily. 365 tablet 0     atorvastatin (LIPITOR) 40 MG tablet Take 1 tablet (40 mg total) by mouth daily. 90 tablet 1     blood glucose test Strp Test blood sugar twice daily.  Uses insulin. Diagnosis E11.9 100 strip 1     diltiazem (CARTIA XT) 240 MG 24 hr capsule Take 1 capsule (240 mg total) by mouth daily. 30 capsule 6     generic lancets (FINGERSTIX LANCETS) Misc Check blood sugar twice daily.  Uses Insulin.  Diagnosis E11.9 100 each 1     glipiZIDE (GLUCOTROL) 5 MG tablet Take 1 tablet by mouth daily.       hydroCHLOROthiazide (HYDRODIURIL) 25 MG tablet Take 1 tablet (25 mg total) by mouth daily. 90 tablet 1     lisinopril (PRINIVIL,ZESTRIL) 40 MG tablet Take 1 tablet (40 mg total) by mouth daily. 90 tablet 1     loratadine (CLARITIN) 10 mg tablet Take 1 tablet  "(10 mg total) by mouth daily. 90 tablet 1     pen needle, diabetic 32 gauge x 1/4\" Ndle Use as needed with insulin 100 each 1     metFORMIN (GLUCOPHAGE XR) 500 MG 24 hr tablet 1 po bid with meals 60 tablet 3     No current facility-administered medications for this visit.        ROS:   Review of systems negative other than as outlined above    Objective:    Visit Vitals     /78     Pulse 68     Wt 201 lb (91.2 kg)     BMI 33.97 kg/m2     Body mass index is 33.97 kg/(m^2).      General appearance no acute distress    Vital signs are stable lungs are clear heart was regular no additional exam done    Labs from 1/16/17 noted    Results for orders placed or performed in visit on 01/16/17   Basic Metabolic Panel   Result Value Ref Range    Sodium 142 136 - 145 mmol/L    Potassium 3.8 3.5 - 5.0 mmol/L    Chloride 106 98 - 107 mmol/L    CO2 27 22 - 31 mmol/L    Anion Gap, Calculation 9 5 - 18 mmol/L    Glucose 179 (H) 70 - 125 mg/dL    Calcium 9.1 8.5 - 10.5 mg/dL    BUN 17 8 - 22 mg/dL    Creatinine 1.04 0.70 - 1.30 mg/dL    GFR MDRD Af Amer >60 >60 mL/min/1.73m2    GFR MDRD Non Af Amer >60 >60 mL/min/1.73m2   Glycosylated Hemoglobin A1c   Result Value Ref Range    Hemoglobin A1c 9.0 (H) 3.5 - 6.0 %   Lipid Cascade FASTING   Result Value Ref Range    Cholesterol 233 (H) <=199 mg/dL    Triglycerides 171 (H) <=149 mg/dL    HDL Cholesterol 43 >=40 mg/dL    LDL Calculated 156 (H) <=129 mg/dL    Patient Fasting > 8hrs? Yes    AST (SGOT)   Result Value Ref Range    AST 27 0 - 40 U/L   ALT (SGPT)   Result Value Ref Range    ALT 33 0 - 45 U/L       Assessment:  1. Type 2 diabetes mellitus without complication  metFORMIN (GLUCOPHAGE XR) 500 MG 24 hr tablet   2. Mixed hyperlipidemia     3. Essential hypertension     4. Need for vaccination  Pneumococcal polysaccharide vaccine 23-valent 1 yo or older, subq/IM     Diabetes mellitus suboptimal control add metformin 500 mg 1 twice a day    Hyperlipidemia suboptimal control " because of poor compliance with medication get on the Lipitor 40 mg take daily    Hypertension, controlled continue lisinopril car T and hydrochlorothiazide, normal renal function    Patient actually needed the PCV 23 he had accidentally got to the PCV 13's this was given today    Healthcare directive discussion as outlined above    Plan:  Total time with patient 25 minutes over 20 minutes was spent in counseling reviewing results discussing medication coordinating care and reviewing the healthcare directive    This transcription uses voice recognition software, which may contain typographical errors.

## 2021-06-08 NOTE — PROGRESS NOTES
Subjective: This patient comes in for evaluation patient is a diabetic last A1c was 7.5 back in 10/31/16.  This was rechecked today.  Patient has been on glipizide 5 mg 1 a day only his sugars are averaging 170.        He'll be following up in 1 week check BMP A1c lipid AST ALT.    It's a little unclear exactly what he is taking as I discussed further with him he doesn't have his medications and I want to make sure I know exactly what he states.  The A1c is up to 9.0 today.    Patient had normal renal function previously but will await today    He had dyspnea on exertion 100 for nuclear stress test on 11/16/16 ejection fraction was 78% there was some left ventricular hypertrophy but no ischemia and no infarct.    Continues on Lipitor I believe 40 mg a day lipid AST and ALT are pending today.  Her graph patient needed a PCV 23 today but actually got PCV 13.  When he comes in in a week he'll need to get PCV 23.    Blood pressure suboptimal control I increased his diltiazem to 240 mg a day, stand lisinopril and HCTZ.    Tobacco status: He  reports that he has never smoked. He has never used smokeless tobacco.    Patient Active Problem List    Diagnosis Date Noted     Exertional dyspnea 11/16/2016     Type 2 diabetes mellitus, uncontrolled 08/21/2015     Essential hypertension 08/21/2015     Environmental allergies 08/21/2015     Hyperlipidemia 08/21/2015     BPH (benign prostatic hyperplasia) 08/21/2015       Current Outpatient Prescriptions   Medication Sig Dispense Refill     aspirin 81 MG EC tablet Take 1 tablet (81 mg total) by mouth daily. 365 tablet 0     atorvastatin (LIPITOR) 40 MG tablet Take 1 tablet (40 mg total) by mouth daily. 90 tablet 1     blood glucose test Strp Test blood sugar twice daily.  Uses insulin. Diagnosis E11.9 100 strip 1     generic lancets (FINGERSTIX LANCETS) Misc Check blood sugar twice daily.  Uses Insulin.  Diagnosis E11.9 100 each 1     glipiZIDE (GLUCOTROL) 5 MG tablet Take 1 tablet  "by mouth daily.       hydroCHLOROthiazide (HYDRODIURIL) 25 MG tablet Take 1 tablet (25 mg total) by mouth daily. 90 tablet 1     lisinopril (PRINIVIL,ZESTRIL) 40 MG tablet Take 1 tablet (40 mg total) by mouth daily. 90 tablet 1     loratadine (CLARITIN) 10 mg tablet Take 1 tablet (10 mg total) by mouth daily. 90 tablet 1     pen needle, diabetic 32 gauge x 1/4\" Ndle Use as needed with insulin 100 each 1     diltiazem (CARTIA XT) 240 MG 24 hr capsule Take 1 capsule (240 mg total) by mouth daily. 30 capsule 6     No current facility-administered medications for this visit.        ROS:   10 point review of systems negative other than as outlined above    Objective:    Visit Vitals     /86 (Patient Site: Left Arm, Patient Position: Sitting, Cuff Size: Adult Large)     Pulse 84     Temp 98.1  F (36.7  C) (Oral)     Resp 20     Ht 5' 4.5\" (1.638 m)     Wt 202 lb (91.6 kg)     BMI 34.14 kg/m2     Body mass index is 34.14 kg/(m^2).  The following high BMI interventions were performed this visit: encouragement to exercise and prescribed dietary intake    Needs to work harder on diet and exercise.    HEENT neck without JVD vital signs were stable    Lungs clear no rales or rhonchi heart regular S1-S2 abdomen is soft bowel sounds are normal    Back without CVA pain    Extremities without edema skin is normal    Pulses are full    Joints without redness warmth or swelling.    Lab work A1c 9.0, lipid AST ALT BMP pending    Results for orders placed or performed in visit on 01/16/17   Glycosylated Hemoglobin A1c   Result Value Ref Range    Hemoglobin A1c 9.0 (H) 3.5 - 6.0 %       Assessment:  1. Type 2 diabetes mellitus without complication  Basic Metabolic Panel    Glycosylated Hemoglobin A1c   2. Mixed hyperlipidemia  Lipid [place] FASTING    AST (SGOT)    ALT (SGPT)   3. Essential hypertension  diltiazem (CARTIA XT) 240 MG 24 hr capsule    Basic Metabolic Panel   4. Dyspnea on exertion     5. Need for pneumococcal " vaccination  Pneumococcal conjugate vaccine 13-valent 6wks-17yrs; >50yrs      Diabetes mellitus poor control, follow-up 1 week pregnant medications and make adjustments    Mixed hyperlipidemia will await the lipid results    Hypertension suboptimal control increase Cartia, diltiazem to 240 mg a day stand lisinopril 40 mg a day and hydrochlorothiazide 25 mg a day    Dyspnea on exertion improved likely deconditioning negative nuclear stress test regarding ischemia or infarct, hypertrophy of the ventricle from hypertension.    Patient was due for PCV 23 and received PCV 13 will need to PCV 23    Recheck in 1 week as outlined, bring in all metastases    Plan:  As discussed above    This transcription uses voice recognition software, which may contain typographical errors.

## 2021-06-09 NOTE — PROGRESS NOTES
"Audiology only; hearing aid check (first; MA patient)    Mr. King presented with a female, Hmong-speaking . He reported that the earmolds are comfortable, and there are no issues with insertion/removal of the devices from his ears, nor with changing batteries. He reported average battery life is seven days. He reported some \"hissing\" at the end of utterances by others; listening checks did reveal hissing in the form of ambient room noise, but speech from both examiner and  were clear. Signals were otherwise strong and free from distortion; devices were both clean and in good repair. Datalogging was not able to be performed, as appointment was held in the ABR room (audiology room was in use by another provider at the time of this appointment). Datalogging should be checked at next HA, which was scheduled 3-10-17 with Paul Yusuf at the [facility]. Mr. King expressed verbal understanding of this information and plan via the .    Paul Rosa., [facility]-A  [place] Licensed Audiologist [id number]    "

## 2021-06-09 NOTE — PROGRESS NOTES
Hearing Aid Check    Vashti King returns today for a hearing aid check. He is accompanied by an .  He reports the hearing aids have been working well for him. He states that he has some trouble hearing far away voices. Vashti reports that when he is in noisy settings the hearing aids are very loud. He states that his left hearing aid is comfortable, but his right earmold causes him some pain. He is happy with the overall volume of the aids. He requested additional batteries.     Otoscopy:  clear canals in both ears  Data Logging: appropriate use (11 hours per day)  Visual inspection:  A listening check revealed the hearing aids had good sound quality.   Action:  Manual programs were added to the hearing aids. The programs include: 1. Calm situation, 2. Speech-in-noise, 3. Speech-in-loud noise. Vashti was shown how to change the programs and he was provided a handout with the programs listed. The upper point of the right earmold was buffed down. Vashti was happy with this change. He is not yet eligible for additional batteries. He was told to contact the clinic on or after 4/5/17 to request more batteries.    He reports subjective improvement with today s changes. He will follow up as needed.    Paul Yusuf., CCC-A  [place] Licensed Audiologist #[id number]

## 2021-06-10 NOTE — PROGRESS NOTES
90-day supply of size 13 hearing aid batteries were dispensed at [facility]. Patient was not seen by provider today.    Jarod Rosa, [facility]  [place] Licensed Audiologist [id number]

## 2021-06-11 NOTE — TELEPHONE ENCOUNTER
Patient was informed of Dr. Madrigal's  message. No further questions.   Patient has a future F2F appointment on 10/5/2020 with the Lab . Completing task.

## 2021-06-11 NOTE — TELEPHONE ENCOUNTER
Please contact this patient family's family.    He never came in for his lab test.  Please schedule lab only appointment within the next week    Let them know that he only got 1 month supply of the diltiazem

## 2021-06-11 NOTE — PROGRESS NOTES
"Vashti King is a 74 y.o. male who is being evaluated via a billable telephone visit.      The patient has been notified of following:     \"This telephone visit will be conducted via a call between you and your physician/provider. We have found that certain health care needs can be provided without the need for a physical exam.  This service lets us provide the care you need with a short phone conversation.  If a prescription is necessary we can send it directly to your pharmacy.  If lab work is needed we can place an order for that and you can then stop by our lab to have the test done at a later time.    Telephone visits are billed at different rates depending on your insurance coverage. During this emergency period, for some insurers they may be billed the same as an in-person visit.  Please reach out to your insurance provider with any questions.    If during the course of the call the physician/provider feels a telephone visit is not appropriate, you will not be charged for this service.\"    Patient has given verbal consent to a Telephone visit? Yes    What phone number would you like to be contacted at? [phone]     Patient would like to receive their AVS by AVS Preference: Mail a copy.    Additional provider notes:     Subjective: Patient comes in for virtual visit, telephone, due to the coronavirus pandemic.    This patient had a virtual visit back in June he has diabetes last A1c 8.4 back in February    He never came in and got labs done.  I do want him to come in we will check lipid A1c and CMP.    Patient is on glipizide 5 mg 1 twice a day metformin 500 mg 2 twice a day.  Blood sugars in the 90-1 20 range by history.    Also will check blood pressure when the patient comes in.  He continues on lisinopril 40 mg a day as well as diltiazem 240 mg a day.    Lipids have been treated with atorvastatin 40 mg a day also takes an aspirin daily    No significant issues or problems otherwise.    Does not have " "any COVID-19 exposure or symptoms.    No chest pain or shortness of breath.  No hypoglycemic symptoms.        Tobacco status: He  reports that he has never smoked. He has never used smokeless tobacco.    Patient Active Problem List    Diagnosis Date Noted     Type 2 diabetes mellitus with microalbuminuria, without long-term current use of insulin (H) 09/05/2019     Microalbuminuria 07/24/2018     Exertional dyspnea 11/16/2016     Type 2 diabetes mellitus, uncontrolled (H) 08/21/2015     Essential hypertension 08/21/2015     Environmental allergies 08/21/2015     Hyperlipidemia 08/21/2015     BPH (benign prostatic hyperplasia) 08/21/2015       Current Outpatient Medications   Medication Sig Dispense Refill     aspirin 81 MG EC tablet Take 1 tablet (81 mg total) by mouth daily. 90 tablet 3     atorvastatin (LIPITOR) 40 MG tablet TAKE 1 TABLET BY MOUTH EVERY DAY 90 tablet 3     blood glucose test (CONTOUR TEST STRIPS) strips TEST BLOOD SUGAR TWICE DAILY 100 strip 1     diltiazem (CARDIZEM CD) 240 MG 24 hr capsule TAKE 1 CAPSULE BY MOUTH EVERY DAY 90 capsule 0     generic lancets (FINGERSTIX LANCETS) Check blood sugar twice daily.  Uses Insulin.  Diagnosis E11.9 100 each 1     glipiZIDE (GLUCOTROL) 5 MG tablet TAKE 1 TABLET BY MOUTH TWICE DAILY WITH MEALS 180 tablet 0     lisinopriL (PRINIVIL,ZESTRIL) 40 MG tablet Take 1 tablet (40 mg total) by mouth daily. 90 tablet 0     loratadine (ALLERGY RELIEF, LORATADINE,) 10 mg tablet TAKE 1 TABLET AS NEEDED FOR ALLERGIES 30 tablet 2     metFORMIN (GLUCOPHAGE-XR) 500 MG 24 hr tablet TAKE 2 TABLETS BY MOUTH TWICE A DAY WITH MEALS 360 tablet 0     pen needle, diabetic 32 gauge x 1/4\" Ndle Use as needed with insulin 100 each 1     tamsulosin (FLOMAX) 0.4 mg cap Take 1 capsule (0.4 mg total) by mouth daily after supper. 90 capsule 3     No current facility-administered medications for this visit.        ROS:   10 point review of systems positive as outlined above otherwise " negative    Objective:    There were no vitals taken for this visit.  There is no height or weight on file to calculate BMI.      General: No acute distress    HEENT: There is been no visual changes    Neck nontender no adenopathy no sore throat    Lungs: Nonlabored breathing no wheezing.    Heart: No palpitations or rapid heartbeat    Abdomen nontender    Extremities without edema.    Skin is normal no rashes    Results for orders placed or performed in visit on 03/02/20   Cologuard External Result   Result Value Ref Range    COLOGUARD_EXT Negative        Assessment:  1. Type 2 diabetes mellitus without complication (H)  metFORMIN (GLUCOPHAGE-XR) 500 MG 24 hr tablet   2. Environmental allergies  loratadine (ALLERGY RELIEF, LORATADINE,) 10 mg tablet   3. Essential hypertension  lisinopriL (PRINIVIL,ZESTRIL) 40 MG tablet    diltiazem (CARDIZEM CD) 240 MG 24 hr capsule   4. Mixed hyperlipidemia     5. Type 2 diabetes mellitus with microalbuminuria, without long-term current use of insulin (H)  glipiZIDE (GLUCOTROL) 5 MG tablet     Diabetes mellitus on metformin and glipizide need recheck A1c.  Blood sugars sound good at home    Patient does have environmental allergies and takes loratadine as needed needed a refill.    Hypertension controlled with lisinopril and diltiazem recheck blood pressure when he comes in.    Hyperlipidemia on atorvastatin, recheck lipid when patient comes in    Plan: No change in meds, patient will be contacted with the results of the lipid A1c and CMP    Discussed follow-up after that, likely 3 months    This transcription uses voice recognition software, which may contain typographical errors.      Phone call duration: 12 minutes, from 3:35 PM through 3:47 PM    Grover Madrigal MD

## 2021-06-11 NOTE — PROGRESS NOTES
Subjective: This patient comes in for evaluation.  He is a 71-year-old male.  Patient has diabetes hypertension hyperlipidemia was last seen here back in January [place] blood sugars 170-175    I reviewed medications he has been on metformin 500 mg 1 twice a day not taking glipizide last A1c was 9.0    He takes lisinopril and diltiazem but is not on hydrochlorothiazide pressure looks to be controlled no edema.    Takes Lipitor 40 mg a day.  Previous LDL was 156 when he was not taking the medicine.    I did check A1c lipid AST and ALT.    I made refills on those medications but held off on metformin will await A1c, as please see below    Patient also takes occasional Claritin/loratadine for seasonal allergies.    He otherwise denies additional problems or issues.    Tobacco status: He  reports that he has never smoked. He has never used smokeless tobacco.    Patient Active Problem List    Diagnosis Date Noted     Exertional dyspnea 11/16/2016     Type 2 diabetes mellitus, uncontrolled 08/21/2015     Essential hypertension 08/21/2015     Environmental allergies 08/21/2015     Hyperlipidemia 08/21/2015     BPH (benign prostatic hyperplasia) 08/21/2015       Current Outpatient Prescriptions   Medication Sig Dispense Refill     aspirin 81 MG EC tablet Take 1 tablet (81 mg total) by mouth daily. 90 tablet 3     atorvastatin (LIPITOR) 40 MG tablet Take 1 tablet (40 mg total) by mouth daily. 90 tablet 1     CONTOUR TEST STRIPS strips TEST BLOOD SUGAR TWICE DAILY 100 strip 1     diltiazem (CARTIA XT) 240 MG 24 hr capsule Take 1 capsule (240 mg total) by mouth daily. 90 capsule 1     generic lancets (FINGERSTIX LANCETS) Misc Check blood sugar twice daily.  Uses Insulin.  Diagnosis E11.9 100 each 1     lisinopril (PRINIVIL,ZESTRIL) 40 MG tablet Take 1 tablet (40 mg total) by mouth daily. 90 tablet 1     loratadine (CLARITIN) 10 mg tablet Take 1 tablet (10 mg total) by mouth daily. 90 tablet 1     metFORMIN (GLUCOPHAGE-XR)  "500 MG 24 hr tablet TAKE 1 TABLET BY MOUTH TWICE DAILY MEALS 60 tablet 0     pen needle, diabetic 32 gauge x 1/4\" Ndle Use as needed with insulin 100 each 1     No current facility-administered medications for this visit.        ROS:   10 point review of systems negative other than as outlined above    Objective:    /84 (Patient Site: Right Arm, Patient Position: Sitting, Cuff Size: Adult Large)  Pulse 72  Temp 97.4  F (36.3  C) (Oral)   Resp 20  Ht 5' 4.25\" (1.632 m)  Wt 194 lb (88 kg)  BMI 33.04 kg/m2  Body mass index is 33.04 kg/(m^2).      General appearance no acute distress.    HEENT neck negative oropharynx clear pupils react normally    Lungs clear no rales or rhonchi heart regular S1-S2, no murmur    Abdomen soft nontender, no masses    Extremities without edema skin was normal.    No swelling in joints no redness or warmth.        Results for orders placed or performed in visit on 06/01/17   Glycosylated Hemoglobin A1c   Result Value Ref Range    Hemoglobin A1c 11.5 (H) 3.5 - 6.0 %       Assessment:  1. Type 2 diabetes mellitus without complication  aspirin 81 MG EC tablet    Glycosylated Hemoglobin A1c   2. Mixed hyperlipidemia  Lipid [place] FASTING    AST (SGOT)    ALT (SGPT)   3. Essential hypertension  diltiazem (CARTIA XT) 240 MG 24 hr capsule    lisinopril (PRINIVIL,ZESTRIL) 40 MG tablet   4. Hyperlipidemia  atorvastatin (LIPITOR) 40 MG tablet   5. Environmental allergies       Diabetes mellitus suboptimal control will need to increase the metformin to 2 tablets twice a day I will contact him with this result when the lipid panel comes back    Hypertension controlled    Hyperlipidemia will await results as outlined    Environmental allergies continue on as needed loratadine.    Plan: As outlined above we will contact the patient and discuss follow-up     This transcription uses voice recognition software, which may contain typographical errors.    "

## 2021-06-11 NOTE — TELEPHONE ENCOUNTER
Please contact patient.  They failed to follow-up for labs.    Please schedule a virtual visit with me for next week.    Let them know that I gave her 30-day supply only of the medication

## 2021-06-12 NOTE — TELEPHONE ENCOUNTER
Called and spoke with Vashti King , Message was given, Vashti King  understood, no further questions.  Scheduled virtual follow up in January      ----- Message from Grover Madrigal MD sent at 10/7/2020  2:55 PM CDT -----  Please contact this patient regarding the results.  They look good the kidney function and liver tests were normal the blood sugar was 135 and the A1c was 7.8.  All are in a good range now.    Also the cholesterol level looked good with a total 131 and the bad cholesterol was only 54.    No change in medications.    Please schedule this patient for a follow-up virtual visit with me for 3 months from now, early January

## 2021-06-12 NOTE — PROGRESS NOTES
Subjective: This patient comes in for evaluation is a 71-year-old male.  He has diabetes hypertension hyperlipidemia.    He has also had some erectile dysfunction problems in the past he been on Viagra when he saw a different physician.    We discussed cost generics etc. he will try sildenafil 1-2 tablets 1 hour prior to sex.  I given 10 tablets with 3 refills.    Discussed risks benefits side effects.    Patient has had sugars in the 1/31/1938 range she is now on metformin 500 mg 2 twice a day since June 1.  His A1c then was 11.5    Blood pressures controlled on lisinopril 40 mg a day as well as diltiazem 240 mg a day.  He does have microalbuminuria as well    LDL was 56 at the last check is on Lipitor 40 mg a day.    Denies any additional problems or issues.    Tobacco status: He  reports that he has never smoked. He has never used smokeless tobacco.    Patient Active Problem List    Diagnosis Date Noted     Exertional dyspnea 11/16/2016     Type 2 diabetes mellitus, uncontrolled 08/21/2015     Essential hypertension 08/21/2015     Environmental allergies 08/21/2015     Hyperlipidemia 08/21/2015     BPH (benign prostatic hyperplasia) 08/21/2015       Current Outpatient Prescriptions   Medication Sig Dispense Refill     aspirin 81 MG EC tablet Take 1 tablet (81 mg total) by mouth daily. 90 tablet 3     atorvastatin (LIPITOR) 40 MG tablet Take 1 tablet (40 mg total) by mouth daily. 90 tablet 1     CONTOUR TEST STRIPS strips TEST BLOOD SUGAR TWICE DAILY 100 strip 1     diltiazem (CARTIA XT) 240 MG 24 hr capsule Take 1 capsule (240 mg total) by mouth daily. 90 capsule 1     generic lancets (FINGERSTIX LANCETS) Misc Check blood sugar twice daily.  Uses Insulin.  Diagnosis E11.9 100 each 1     lisinopril (PRINIVIL,ZESTRIL) 40 MG tablet Take 1 tablet (40 mg total) by mouth daily. 90 tablet 1     loratadine (CLARITIN) 10 mg tablet Take 1 tablet (10 mg total) by mouth daily. 90 tablet 1     pen needle, diabetic 32 gauge x  "1/4\" Ndle Use as needed with insulin 100 each 1     metFORMIN (GLUCOPHAGE XR) 500 MG 24 hr tablet 2 po bid with meals 120 tablet 5     sildenafil (REVATIO) 20 mg tablet 1-2 po 1 hour prior to sex as needed 10 tablet 3     No current facility-administered medications for this visit.        ROS:   10 point review of systems positive as outlined otherwise negative    Objective:    /86  Pulse 78  Temp 97.5  F (36.4  C) (Oral)   Resp 20  Ht 5' 4.25\" (1.632 m)  Wt 191 lb (86.6 kg)  BMI 32.53 kg/m2  Body mass index is 32.53 kg/(m^2).      General appearance no acute distress.    HEENT neck is supple oropharynx was clear    Neck was without bruit    Lungs clear no rales or rhonchi, heart regular S1-S2 rate in the 70s    Abdomen is soft nontender.    Extremities without edema skin was normal, no joint redness warmth or swelling.    A1c is down to 8.8    Results for orders placed or performed in visit on 08/07/17   Glycosylated Hemoglobin A1c   Result Value Ref Range    Hemoglobin A1c 8.8 (H) 3.5 - 6.0 %       Assessment:  1. Type 2 diabetes mellitus without complication  metFORMIN (GLUCOPHAGE XR) 500 MG 24 hr tablet    Glycosylated Hemoglobin A1c   2. Essential hypertension     3. Mixed hyperlipidemia     4. ED (erectile dysfunction)  sildenafil (REVATIO) 20 mg tablet   5. Microalbuminuria       Diabetes mellitus improving recheck again in 2 months    Continue lisinopril and diltiazem    Continue Lipitor 40 mg a day    We will treat with sildenafil for the ED.    Plan: As outlined above    This transcription uses voice recognition software, which may contain typographical errors.    "

## 2021-06-13 NOTE — PROGRESS NOTES
90-day supply of size 13 hearing aid batteries was dispensed. Patient was not seen by provider today.    Jarod Rosa, [facility]  [place] Licensed Audiologist [id number]

## 2021-06-13 NOTE — TELEPHONE ENCOUNTER
DOD  Medication request received via fax from [facility] pharmacy. Please review and sign if refill is appropriate.

## 2021-06-13 NOTE — TELEPHONE ENCOUNTER
Please review refill encounter created 12/21/2020.     Inform daughter if they wish to use a new pharmacy they can all the new pharmacy and get all the medications transfer from the old pharmacy.     Daughter stated they only want the test strips to be sent to [facility] all the other medications they want it to stay at [facility] still.    completing this telephone encounter.

## 2021-06-13 NOTE — TELEPHONE ENCOUNTER
Daughter called about the status of the test strips. Once approve please call patient [phone] and let him know about the approve strips.     Please review telephone encounter created 12/21/2020 for further details.

## 2021-06-13 NOTE — TELEPHONE ENCOUNTER
Reason for Call:  Medication or medication refill:    Do you use a [facility]?  Name of the pharmacy and phone number for the current request: no    Name of the medication requested: test strips    Other request: pt would like to transfer all meds to [facility] and marnie    Can we leave a detailed message on this number? Yes    Phone number patient can be reached at: Other phone number:  [phone]*    Best Time: anytime    Call taken on 12/21/2020 at 1:22 PM by Pricilla Jason

## 2021-06-14 NOTE — TELEPHONE ENCOUNTER
Requested Prescriptions     Pending Prescriptions Disp Refills     diltiazem (CARDIZEM CD) 240 MG 24 hr capsule [Pharmacy Med Name: DILTIAZEM 24H ER(CD) 240 MG CP] 30 capsule 0     Sig: TAKE 1 CAPSULE BY MOUTH EVERY DAY

## 2021-06-14 NOTE — PROGRESS NOTES
Patient normally takes medication daily, but had not taken his medicine this morning before coming in for check.  He has an automated BP cuff at home.  Plan to go home, take medicine, and then re-check BP this afternoon.  Will have CMA call patient later today to get reading.

## 2021-06-14 NOTE — PROGRESS NOTES
Vashti King is a 75 y.o. male who is being evaluated via a billable telephone visit.      What phone number would you like to be contacted at? [phone]  How would you like to obtain your AVS? AVS Preference: Emily.  Assessment & Plan     Vashti was seen today for follow-up and diabetes.    Diagnoses and all orders for this visit:    Type 2 diabetes mellitus with microalbuminuria, without long-term current use of insulin (H)  -     Glycosylated Hemoglobin A1c; Future  -     [place] [facility] [place]  -     glipiZIDE (GLUCOTROL) 5 MG tablet; TAKE 1 TABLET BY MOUTH TWICE DAILY WITH MEALS    Essential hypertension  -     lisinopriL (PRINIVIL,ZESTRIL) 40 MG tablet; Take 1 tablet (40 mg total) by mouth daily.  -     diltiazem (CARDIZEM CD) 240 MG 24 hr capsule; Take 1 capsule (240 mg total) by mouth daily.    Mixed hyperlipidemia    Microalbuminuria  -     Microalbumin, Random Urine; Future    Benign prostatic hyperplasia, unspecified whether lower urinary tract symptoms present  -     PSA (Prostatic-Specific Antigen), Diagnostic; Future  -     tamsulosin (FLOMAX) 0.4 mg cap; Take 1 capsule (0.4 mg total) by mouth daily after supper.    Nocturia  -     PSA (Prostatic-Specific Antigen), Diagnostic; Future    Hyperlipidemia  -     atorvastatin (LIPITOR) 40 MG tablet; Take 1 tablet (40 mg total) by mouth daily.    Type 2 diabetes mellitus without complication (H)  -     aspirin 81 MG EC tablet; Take 1 tablet (81 mg total) by mouth daily.  -     metFORMIN (GLUCOPHAGE-XR) 500 MG 24 hr tablet; 2 po two times a day at breakfast and supper      Patient has diabetes, will recheck A1c blood sugar seem to be under control.  Continue on same Metformin 500 mg 2 twice a day and glipizide 5 mg 1 twice a day.    Hypertension recheck blood pressure patient is on lisinopril and diltiazem    Hyperlipidemia controlled on atorvastatin    BPH with nocturia check diagnostic PSA.      Assessment  requiring an independent historian(s) - family - Patient's son, patient son was better to articulate patient's will blood sugars and meds, and house that he was doing    Diagnosis or treatment significantly limited by social determinants of health -poor socioeconomic status with non-English-speaking elderly immigrant    21 minutes  spent on the virtual telephone visit, date of the encounter doing chart review, history and exam, documentation and further activities as noted above    {Provider  Link to [facility] Help Grid :621126}       No follow-ups on file.    Grover Madrigal MD  [facility]    Subjective     Vashti King is 75 y.o. and presents to clinic today for the following health issues   HPI   This patient had a virtual visit, telephone, due to the coronavirus pandemic.    Patient has diabetes last A1c was 7.8 back in October average blood sugar at home in the 130s.    Patient has lisinopril for blood pressure as well as diltiazem.    He has been on atorvastatin for lipids, LDL was 54.  Normal liver function normal renal function.    Patient is due to see ophthalmology referral was placed.    Patient does have nocturia takes tamsulosin for BPH we will check diagnostic PSA    Additional labs will be A1c microalbumin    We will check blood pressure when he comes in    Referral to [facility].    No additional concerns or issue        Review of Systems  10 point review of systems positive as outlined above otherwise negative      Objective       Vitals:  No vitals were obtained today due to virtual visit.    Physical Exam  General: No acute distress    HEENT there is been no visual changes no headaches    Lungs: Nonlabored breathing no wheezing no 18 center exposure    Extremities without edema.    Abdomen nontender    No palpitations or rapid heart rate

## 2021-06-14 NOTE — TELEPHONE ENCOUNTER
ISABELLEI: Pt came into the clinic for a blood pressure check and it was high. Keegan Huertas who was DOD for this morning said for the pt to go home and take his medication then to check his blood pressure at home then let us know what his reading is. Per pt said he got a reading of 144/94.

## 2021-06-15 NOTE — PROGRESS NOTES
Vashti King is a 75 y.o. male who is being evaluated via a billable telephone visit.      What phone number would you like to be contacted at? [phone]  How would you like to obtain your AVS? AVS Preference: Emily.    Assessment & Plan     Vashti was seen today for follow-up and medication refill.    Diagnoses and all orders for this visit:    Type 2 diabetes mellitus with microalbuminuria, without long-term current use of insulin (H)  -     Glycosylated Hemoglobin A1c; Future    Essential hypertension  -     metoprolol succinate (TOPROL XL) 50 MG 24 hr tablet; Take 1 tablet (50 mg total) by mouth daily.    Mixed hyperlipidemia    Microalbuminuria        Patient has diabetes question control blood sugar he states was around 100 today.  Last A1c 8.0.  Please see below regarding medications.  Check A1c next week    Hypertension suboptimal control stay on lisinopril 40 mg a day and diltiazem 240 mg a day.  Increase the metoprolol from 25 to 50 mg a day check blood pressure next week.    Hyperlipidemia controlled on atorvastatin LDL was 54    Microalbuminuria continue on lisinopril    Patient does get some swelling on his feet occasionally but none now it happens when he sits for too long discussed elevation, consider support hose.    Patient be contacted with results and discuss follow-up likely face-to-face this summer for annual wellness visit        Review of the result(s) of each unique test - Review of labs from January  Diagnosis or treatment significantly limited by social determinants of health - Low socioeconomic status non-English-speaking immigrant  Independent history from patient's son    22 minutes spent on the date of the encounter doing chart review, history and exam, documentation and further activities as noted above  [id number]}     Return in about 3 months (around 6/10/2021) for Annual physical.    Grover Madrigal MD  [facility]    Subjective   Vashti King is 75 y.o.  and presents today for the following health issues   HPI     This patient had a virtual visit, telephone, due to the coronavirus pandemic.    Patient was present along with an .    Patient has hypertension.  Blood pressure 148/100 back on 1/22/2021 I added metoprolol 25 mg a day.  Blood pressure still is 140/90 range.    We discussed his medication he is taking the lisinopril and diltiazem along with metoprolol    I increase the metoprolol from 25 mg up to 50 mg daily and sent a new prescription.    Patient continues on atorvastatin 40 mg a day LDL was 54.    Patient is on glipizide 5 mg he stayed on 1 tablet twice a day I told him to increase to 1 in the morning and 2 at supper.  He does take Metformin 500 mg 2 twice a day.    Last A1c was 8.0 on 1/22/2021 and we will recheck the A1c when he comes in next week.    Patient had a normal PSA and his microalbumin was elevated he does take lisinopril as discussed.    No COVID-19 symptoms or exposure.  Discussed getting the vaccination.    No new problems or issues.    No numbness in through the feet    Review of Systems  10 point review of systems positive as outlined above otherwise negative      Objective       Vitals:  No vitals were obtained today due to virtual visit.    Physical Exam  General no acute distress    HEENT denies any nasal congestion or sore throat no vision changes    Lungs: Nonlabored breathing no wheezing.    Heart: No palpitations or rapid heart rate    Skin is normal no rashes    Extremities, lower no edema no atrophic changes or numbness through the feet          Phone call duration: 14 minutes

## 2021-06-15 NOTE — PROGRESS NOTES
Subjective: Patient comes in for evaluation is a 72-year-old male.  Patient has history of diabetes last A1c was 8.8 back in August.  He is on metformin 500 mg 2 twice a day says his sugars are in the 130s.  Will recheck A1c today    He continues on lisinopril and diltiazem for blood pressure and that looks controlled also takes Lipitor 40 mg a day previous LDL back in June was good    He does have some erectile dysfunction.  He had sildenafil prescription but never got it filled.  I sent another one to the new pharmacy he is now going to [facility]    He got a bill regarding his hearing aid and had questions regarding that I sent him to the referral specialist here to evaluate that further.    Otherwise no additional concerns or issues    Denies any chest pain or shortness of breath denies any bowel or bladder issues    Tobacco status: He  reports that he has never smoked. He has never used smokeless tobacco.    Patient Active Problem List    Diagnosis Date Noted     Exertional dyspnea 11/16/2016     Type 2 diabetes mellitus, uncontrolled 08/21/2015     Essential hypertension 08/21/2015     Environmental allergies 08/21/2015     Hyperlipidemia 08/21/2015     BPH (benign prostatic hyperplasia) 08/21/2015       Current Outpatient Prescriptions   Medication Sig Dispense Refill     aspirin 81 MG EC tablet Take 1 tablet (81 mg total) by mouth daily. 90 tablet 3     atorvastatin (LIPITOR) 40 MG tablet Take 1 tablet (40 mg total) by mouth daily. 90 tablet 1     blood glucose test (CONTOUR TEST STRIPS) strips TEST BLOOD SUGAR TWICE DAILY 100 strip 1     diltiazem (CARTIA XT) 240 MG 24 hr capsule Take 1 capsule (240 mg total) by mouth daily. 90 capsule 1     generic lancets (FINGERSTIX LANCETS) Check blood sugar twice daily.  Uses Insulin.  Diagnosis E11.9 100 each 1     lisinopril (PRINIVIL,ZESTRIL) 40 MG tablet Take 1 tablet (40 mg total) by mouth daily. 90 tablet 1     loratadine (CLARITIN) 10 mg tablet Take 1 tablet (10 mg  "total) by mouth daily. 90 tablet 1     metFORMIN (GLUCOPHAGE XR) 500 MG 24 hr tablet 2 po bid with meals 120 tablet 5     pen needle, diabetic 32 gauge x 1/4\" Ndle Use as needed with insulin 100 each 1     sildenafil, antihypertensive, (REVATIO) 20 mg tablet 1-2 po 1 hour prior to sex as needed 10 tablet 3     No current facility-administered medications for this visit.        ROS:   10 point review of systems negative other than as outlined above, med reconciliation done as outlined    Objective:    /88 (Patient Site: Left Arm, Patient Position: Sitting, Cuff Size: Adult Large)  Pulse (!) 112  Temp 98.7  F (37.1  C) (Oral)   Resp 24  Ht 5' 4.25\" (1.632 m)  Wt 193 lb (87.5 kg)  SpO2 94% Comment: at rest with room air  BMI 32.87 kg/m2  Body mass index is 32.87 kg/(m^2).      General appearance no acute distress    Neck was negative no adenopathy oropharynx was clear rate was in the 100 range when I listened O2 sat 94%    Lungs are clear throughout no rales or rhonchi heart was regular S1-S2 without murmur    Neck without tenderness    HEENT pupils react normally no scleral icterus    Abdomen soft nontender back without CVA pain    Extremities without edema skin is normal.    Labs, A1c, BMP pending    Results for orders placed or performed in visit on 08/07/17   Glycosylated Hemoglobin A1c   Result Value Ref Range    Hemoglobin A1c 8.8 (H) 3.5 - 6.0 %       Assessment:  1. Type 2 diabetes mellitus without complication  pen needle, diabetic 32 gauge x 1/4\" Ndle    metFORMIN (GLUCOPHAGE XR) 500 MG 24 hr tablet    generic lancets (FINGERSTIX LANCETS)    blood glucose test (CONTOUR TEST STRIPS) strips    aspirin 81 MG EC tablet    Glycosylated Hemoglobin A1c    Basic Metabolic Panel   2. ED (erectile dysfunction)  sildenafil, antihypertensive, (REVATIO) 20 mg tablet   3. Environmental allergies  loratadine (CLARITIN) 10 mg tablet   4. Essential hypertension  lisinopril (PRINIVIL,ZESTRIL) 40 MG tablet    " diltiazem (CARTIA XT) 240 MG 24 hr capsule    Basic Metabolic Panel   5. Hyperlipidemia  atorvastatin (LIPITOR) 40 MG tablet   6. Hearing loss       Patient also had a flu shot    Diabetes mellitus seems to be under control will await A1c    ED refill on sildenafil    Hypertension controlled with lisinopril and diltiazem    Hyperlipidemia has been stable on Lipitor no side effects.    He also takes loratadine no active symptoms refill on that.    Hearing issues now his hearing aid please see above discussion regarding billing issues    Plan: As outlined above we will contact him and discuss follow-up    This transcription uses voice recognition software, which may contain typographical errors.

## 2021-06-16 PROBLEM — R80.9 MICROALBUMINURIA: Status: ACTIVE | Noted: 2018-07-24

## 2021-06-16 PROBLEM — E11.29 TYPE 2 DIABETES MELLITUS WITH MICROALBUMINURIA, WITHOUT LONG-TERM CURRENT USE OF INSULIN (H): Status: ACTIVE | Noted: 2019-09-05

## 2021-06-16 PROBLEM — R80.9 TYPE 2 DIABETES MELLITUS WITH MICROALBUMINURIA, WITHOUT LONG-TERM CURRENT USE OF INSULIN (H): Status: ACTIVE | Noted: 2019-09-05

## 2021-06-16 NOTE — TELEPHONE ENCOUNTER
----- Message from Grover Madrigal MD sent at 3/18/2021  8:46 AM CDT -----  Please contact this patient, let him know the A1c was down to 7.4, in a good range stay on the same medications for diabetes as well as same other medication    Please schedule a follow-up virtual visit with me at the clinic for 3 months from now.  Bring in all medicine

## 2021-06-16 NOTE — TELEPHONE ENCOUNTER
----- Message from Grover Madrigal MD sent at 3/18/2021  9:33 AM CDT -----  : Grover Madrigal MD (Physician)      Please contact patient's family.  Let him know the blood pressure remains elevated despite the additional medicine.  He needs to stay on all his blood pressure medicines but also go on hydrochlorothiazide 12.5 mg 1 a day.  I sent a prescription to his pharmacy     Please see the additional lab test from today and task associated with that, regarding follow-up            ----- Message -----  From: Bisi Esparza CMA  Sent: 3/18/2021   9:29 AM CDT  To: Grover Madrigal MD

## 2021-06-16 NOTE — TELEPHONE ENCOUNTER
Refill Approved    Rx renewed per Medication Renewal Policy. Medication was last renewed on 9/3/20.    Darnell Camarillo, [facility] Connection Triage/Med Refill 3/22/2021     Requested Prescriptions   Pending Prescriptions Disp Refills     loratadine (CLARITIN) 10 mg tablet [Pharmacy Med Name: LORATADINE 10 MG TABLET] 90 tablet 2     Sig: TAKE 1 TABLET BY MOUTH EVERY DAY - **OTC ITEM - DRUG NOT COVERED--USE DISCOUNT CARD**       Antihistamine Refill Protocol Passed - 3/20/2021  9:48 AM        Passed - Patient has had office visit/physical in last year     Last office visit with prescriber/PCP: 2/24/2020 Grover Madrigal MD OR same dept: Visit date not found OR same specialty: 2/24/2020 Grover Madrigal MD  Last physical: Visit date not found Last MTM visit: Visit date not found   Next visit within 3 mo: Visit date not found  Next physical within 3 mo: Visit date not found  Prescriber OR PCP: Grover Madrigal MD  Last diagnosis associated with med order: 1. Environmental allergies  - loratadine (CLARITIN) 10 mg tablet [Pharmacy Med Name: LORATADINE 10 MG TABLET]; TAKE 1 TABLET BY MOUTH EVERY DAY - **OTC ITEM - DRUG NOT COVERED--USE DISCOUNT CARD**  Dispense: 90 tablet; Refill: 2    If protocol passes may refill for 12 months if within 3 months of last provider visit (or a total of 15 months).

## 2021-06-16 NOTE — PROGRESS NOTES
I met with Vashti King at the request of Grover Madrigal MD   to recheck his blood pressure.  Blood pressure medications on the MAR were reviewed with patient.    Patient has taken all medications as per usual regimen: Yes  Patient reports tolerating them without any issues or concerns: Yes    Vitals:    03/18/21 0914 03/18/21 0928   BP: (!) 161/92 (!) 153/96   Patient Site: Left Arm Left Arm   Patient Position: Sitting Sitting   Cuff Size: Adult Large Adult Large   Pulse: 61 61       After 5 minutes, the patient's blood pressure remained greater than or equal to 140/90.    Is the patient currently having any chest pain?  No  Does the patient currently have a headache?   No  Does the patient currently have any vision changes? No  Does the patient currently have any nausea? No  Does the patient currently have any abdominal pain? No    The previous encounter was reviewed.  The patient was discharged and the note will be sent to the provider for final review.

## 2021-06-16 NOTE — PROGRESS NOTES
Please contact patient's family.  Let him know the blood pressure remains elevated despite the additional medicine.  He needs to stay on all his blood pressure medicines but also go on hydrochlorothiazide 12.5 mg 1 a day.  I sent a prescription to his pharmacy    Please see the additional lab test from today and task associated with that, regarding follow-up

## 2021-06-17 NOTE — TELEPHONE ENCOUNTER
metoprolol succinate (TOPROL XL) 25 MG [[id number]]    Electronically signed by: Grover Madrigal MD on 01/25/21 0754 Status: Discontinued   Ordering user: Grover Madrigal MD 01/25/21 0754 Authorized by: Grover Madrigal MD   Frequency: DAILY 01/25/21 - 03/10/21  Discontinued by: Grover Madrigal MD 03/10/21 0916   Diagnoses   Type 2 diabetes mellitus with microalbuminuria, without long-term current use of insulin (H) [E11.29, R80.9]   Medication comments: Take this in addition to the diltiazem and lisinopril for blood pressure control

## 2021-06-17 NOTE — TELEPHONE ENCOUNTER
Refill Approved    Rx renewed per Medication Renewal Policy. Medication was last renewed on 1/25/21.    Darnell Camarillo, [facility] Connection Triage/Med Refill 5/24/2021     Requested Prescriptions   Pending Prescriptions Disp Refills     glipiZIDE (GLUCOTROL) 5 MG tablet [Pharmacy Med Name: GLIPIZIDE 5 MG TABLET] 360 tablet 0     Sig: TAKE 2 TABLETS BY MOUTH TWICE A DAY WITH MEALS       Oral Hypoglycemics Refill Protocol Passed - 5/23/2021  1:30 PM        Passed - Visit with PCP or prescribing provider visit in last 6 months       Last office visit with prescriber/PCP: Visit date not found OR same dept: Visit date not found OR same specialty: 2/24/2020 Grover Madrigal MD Last physical: Visit date not found Last MTM visit: Visit date not found         Next appt within 3 mo: Visit date not found  Next physical within 3 mo: Visit date not found  Prescriber OR PCP: Grover Madrigal MD  Last diagnosis associated with med order: 1. Type 2 diabetes mellitus with microalbuminuria, without long-term current use of insulin (H)  - glipiZIDE (GLUCOTROL) 5 MG tablet [Pharmacy Med Name: GLIPIZIDE 5 MG TABLET]; TAKE 2 TABLETS BY MOUTH TWICE A DAY WITH MEALS  Dispense: 360 tablet; Refill: 0     If protocol passes may refill for 12 months if within 3 months of last provider visit (or a total of 15 months).           Passed - A1C in last 6 months     Hemoglobin A1c   Date Value Ref Range Status   03/18/2021 7.4 (H) <=5.6 % Final               Passed - Microalbumin in last year      Microalbumin, Random Urine   Date Value Ref Range Status   01/22/2021 6.04 (H) 0.00 - 1.99 mg/dL Final                  Passed - Blood pressure in last year     BP Readings from Last 1 Encounters:   03/18/21 (!) 153/96             Passed - Serum creatinine in last year     Creatinine   Date Value Ref Range Status   10/05/2020 1.15 0.70 - 1.30 mg/dL Final

## 2021-06-19 NOTE — PROGRESS NOTES
Subjective: Patient comes in for follow-up evaluation.  Last here January 2018    Continues on lisinopril and diltiazem for blood pressure, recheck pressure 136/88 we will continue to monitor that.    Patient has normal sensation through the feet.    We will try to get records on my exam.    Patient has diabetes last A1c was 8.3    He states blood sugars are good in the 120-160 range he continues on metformin    Had normal previous renal function.    Takes Lipitor 40 mg a day    Refuses additional healthcare maintenance issues did not want to do any Hemoccults or Bob White guard or immunizations.    I did check CMP A1c microalbumin and lipid please see below    No additional concerns or issues    Tobacco status: He  reports that he has never smoked. He has never used smokeless tobacco.    Patient Active Problem List    Diagnosis Date Noted     Microalbuminuria 07/24/2018     Exertional dyspnea 11/16/2016     Type 2 diabetes mellitus, uncontrolled (H) 08/21/2015     Essential hypertension 08/21/2015     Environmental allergies 08/21/2015     Hyperlipidemia 08/21/2015     BPH (benign prostatic hyperplasia) 08/21/2015       Current Outpatient Prescriptions   Medication Sig Dispense Refill     aspirin 81 MG EC tablet Take 1 tablet (81 mg total) by mouth daily. 90 tablet 3     atorvastatin (LIPITOR) 40 MG tablet Take 1 tablet (40 mg total) by mouth daily. 90 tablet 1     blood glucose test (CONTOUR TEST STRIPS) strips TEST BLOOD SUGAR TWICE DAILY 100 strip 1     diltiazem (CARDIZEM CD) 240 MG 24 hr capsule Take 1 capsule (240 mg total) by mouth daily. 90 capsule 0     generic lancets (FINGERSTIX LANCETS) Check blood sugar twice daily.  Uses Insulin.  Diagnosis E11.9 100 each 1     lisinopril (PRINIVIL,ZESTRIL) 40 MG tablet Take 1 tablet (40 mg total) by mouth daily. 90 tablet 0     loratadine (CLARITIN) 10 mg tablet Take 1 tablet (10 mg total) by mouth daily. 90 tablet 1     metFORMIN (GLUCOPHAGE XR) 500 MG 24 hr tablet 2 po  "bid with meals 120 tablet 5     pen needle, diabetic 32 gauge x 1/4\" Ndle Use as needed with insulin 100 each 1     No current facility-administered medications for this visit.        ROS: 10 point review of systems negative other than as outlined above    Objective:    /88 (Patient Site: Right Arm, Patient Position: Sitting, Cuff Size: Adult Large)  Pulse 76  Resp 24  Wt 192 lb (87.1 kg)  BMI 32.7 kg/m2  Body mass index is 32.7 kg/(m^2).      General appearance no acute distress    Vital signs stable as outlined    HEENT: Neck negative no adenopathy oropharynx clear eyes without scleral icterus    Lungs were clear no rales rhonchi heart was regular rate in the 70 range no murmur    Abdomen nontender no guarding rebound    Extremities without edema pulses normal sensation normal.    No joint redness warmth or swelling.    Lab work renal function normal liver function normal.    Blood sugar was 181 A1c 7.8.  Microalbumin previously elevated now in the normal range continue on lisinopril        Results for orders placed or performed in visit on 07/24/18   Comprehensive Metabolic Panel   Result Value Ref Range    Sodium 141 136 - 145 mmol/L    Potassium 4.6 3.5 - 5.0 mmol/L    Chloride 104 98 - 107 mmol/L    CO2 27 22 - 31 mmol/L    Anion Gap, Calculation 10 5 - 18 mmol/L    Glucose 181 (H) 70 - 125 mg/dL    BUN 13 8 - 28 mg/dL    Creatinine 1.08 0.70 - 1.30 mg/dL    GFR MDRD Af Amer >60 >60 mL/min/1.73m2    GFR MDRD Non Af Amer >60 >60 mL/min/1.73m2    Bilirubin, Total 0.9 0.0 - 1.0 mg/dL    Calcium 10.2 8.5 - 10.5 mg/dL    Protein, Total 8.0 6.0 - 8.0 g/dL    Albumin 4.1 3.5 - 5.0 g/dL    Alkaline Phosphatase 62 45 - 120 U/L    AST 39 0 - 40 U/L    ALT 43 0 - 45 U/L   Lipid Cascade FASTING   Result Value Ref Range    Cholesterol 199 <=199 mg/dL    Triglycerides 242 (H) <=149 mg/dL    HDL Cholesterol 46 >=40 mg/dL    LDL Calculated 105 <=129 mg/dL    Patient Fasting > 8hrs? Yes    Glycosylated " Hemoglobin A1c   Result Value Ref Range    Hemoglobin A1c 7.8 (H) 3.5 - 6.0 %   Microalbumin, Random Urine   Result Value Ref Range    Microalbumin, Random Urine 1.39 0.00 - 1.99 mg/dL    Creatinine, Urine 239.1 mg/dL    Microalbumin/Creatinine Ratio Random Urine 5.8 <=19.9 mg/g       Assessment:  1. Uncontrolled type 2 diabetes mellitus with microalbuminuria, without long-term current use of insulin  Comprehensive Metabolic Panel    Glycosylated Hemoglobin A1c    Microalbumin, Random Urine   2. Essential hypertension  Comprehensive Metabolic Panel   3. Microalbuminuria     4. Mixed hyperlipidemia  Comprehensive Metabolic Panel    Lipid Cascade FASTING     Stable physical    Hypertension controlled    Microalbumin control    Hyperlipidemia controlled    Patient refuses healthcare maintenance but otherwise has been doing well regarding his diabetes hypertension and hyperlipidemia    Plan to recheck in 4 months    Plan: No change in medicines    This transcription uses voice recognition software, which may contain typographical errors.

## 2021-06-27 ENCOUNTER — HEALTH MAINTENANCE LETTER (OUTPATIENT)
Age: 76
End: 2021-06-27

## 2021-06-30 NOTE — PROGRESS NOTES
Progress Notes by Jose Payton CMA at 1/22/2021  9:00 AM     Author: Jose Payton CMA Service: -- Author Type: Certified Medical Assistant    Filed: 1/22/2021  9:53 AM Encounter Date: 1/22/2021 Status: Attested    : Jose Payton CMA (Certified Medical Assistant) Cosigner: Kiya Diaz PA-C at 1/22/2021  2:25 PM    Attestation signed by Kiya Diaz PA-C at 1/22/2021  2:25 PM    Patient normally takes medication daily, but had not taken his medicine this morning before coming in for check.  He has an automated BP cuff at home.  Plan to go home, take medicine, and then re-check BP this afternoon.  Will have PATRICIA call patient later today to get reading.                I met with Vashti King at the request of Dr. Madrigal to recheck his blood pressure.  Blood pressure medications on the MAR were reviewed with patient.    Patient has taken all medications as per usual regimen: Yes  Patient reports tolerating them without any issues or concerns: Yes    Vitals:    01/22/21 0852 01/22/21 0910   BP: (!) 162/95 (!) 148/100   Patient Site: Right Arm Right Arm   Patient Position: Sitting Sitting   Cuff Size: Adult Large Adult Large   Pulse: 99        After 5 minutes, the patient's blood pressure remained greater than or equal to 140/90.    Is the patient currently having any chest pain?  No  Does the patient currently have a headache?   No  Does the patient currently have any vision changes? No  Does the patient currently have any nausea? No  Does the patient currently have any abdominal pain? No    Patient was instructed to take blood pressure medication when gets home then take blood pressure reading and to call the clinic back with the reading. And to also check blood pressure and call clinic back next week with the reading.

## 2021-07-03 NOTE — ADDENDUM NOTE
Addendum Note by Thang Armstrong MLT at 1/26/2017  3:25 PM     Author: Thang Armstrong MLT Service: -- Author Type:     Filed: 1/26/2017  3:25 PM Encounter Date: 1/23/2017 Status: Signed    : Thang Armstrong MLT ()    Addended by: THANG ARMSTRONG on: 1/26/2017 03:25 PM        Modules accepted: Orders

## 2021-08-10 ENCOUNTER — TELEPHONE (OUTPATIENT)
Dept: FAMILY MEDICINE | Facility: CLINIC | Age: 76
End: 2021-08-10

## 2021-08-10 DIAGNOSIS — E11.29 TYPE 2 DIABETES MELLITUS WITH MICROALBUMINURIA, WITHOUT LONG-TERM CURRENT USE OF INSULIN (H): ICD-10-CM

## 2021-08-10 DIAGNOSIS — R80.9 TYPE 2 DIABETES MELLITUS WITH MICROALBUMINURIA, WITHOUT LONG-TERM CURRENT USE OF INSULIN (H): ICD-10-CM

## 2021-08-10 DIAGNOSIS — N40.0 BENIGN PROSTATIC HYPERPLASIA, UNSPECIFIED WHETHER LOWER URINARY TRACT SYMPTOMS PRESENT: ICD-10-CM

## 2021-08-10 DIAGNOSIS — E11.9 TYPE 2 DIABETES MELLITUS WITHOUT COMPLICATION (H): ICD-10-CM

## 2021-08-10 DIAGNOSIS — I10 ESSENTIAL HYPERTENSION: ICD-10-CM

## 2021-08-10 NOTE — TELEPHONE ENCOUNTER
Pending Prescriptions:                       Disp   Refills    lisinopril (ZESTRIL) 40 MG tablet         90 tab*1            Sig: Take 1 tablet (40 mg) by mouth daily

## 2021-08-11 DIAGNOSIS — I10 ESSENTIAL HYPERTENSION: ICD-10-CM

## 2021-08-11 NOTE — TELEPHONE ENCOUNTER
Reason for Call:  Medication or medication refill:    Do you use a [facility]?  Name of the pharmacy and phone number for the current request:      Name of the medication requested: metropolol    Other request:     Can we leave a detailed message on this number? YES    Phone number patient can be reached at: Home number on file [phone] (home)    Best Time: asap please    Call taken on 8/11/2021 at 12:34 PM by Mai Newton

## 2021-08-12 RX ORDER — LISINOPRIL 40 MG/1
40 TABLET ORAL DAILY
Qty: 90 TABLET | Refills: 0 | Status: SHIPPED | OUTPATIENT
Start: 2021-08-12 | End: 2021-09-21

## 2021-08-13 RX ORDER — METOPROLOL SUCCINATE 50 MG/1
50 TABLET, EXTENDED RELEASE ORAL DAILY
Qty: 90 TABLET | Refills: 3 | Status: SHIPPED | OUTPATIENT
Start: 2021-08-13 | End: 2022-08-10

## 2021-08-13 NOTE — TELEPHONE ENCOUNTER
"Last Written Prescription Date:  3/10/21  Last Fill Quantity: 30,  # refills: 3   Last office visit provider:  6/21/21     Requested Prescriptions   Pending Prescriptions Disp Refills     metoprolol succinate ER (TOPROL-XL) 50 MG 24 hr tablet 30 tablet 3     Sig: Take 1 tablet (50 mg) by mouth daily       Beta-Blockers Protocol Passed - 8/11/2021  1:10 PM        Passed - Blood pressure under 140/90 in past 12 months     BP Readings from Last 3 Encounters:   06/21/21 120/71   03/18/21 (!) 153/96   01/22/21 (!) 148/100                 Passed - Patient is age 6 or older        Passed - Recent (12 mo) or future (30 days) visit within the authorizing provider's specialty     Patient has had an office visit with the authorizing provider or a provider within the authorizing providers department within the previous 12 mos or has a future within next 30 days. See \"Patient Info\" tab in inbasket, or \"Choose Columns\" in Meds & Orders section of the refill encounter.              Passed - Medication is active on med list             Delicia Saba RN 08/13/21 5:38 PM  "

## 2021-09-14 DIAGNOSIS — E78.5 HYPERLIPIDEMIA: ICD-10-CM

## 2021-09-14 DIAGNOSIS — E11.9 TYPE 2 DIABETES MELLITUS WITHOUT COMPLICATION (H): ICD-10-CM

## 2021-09-15 DIAGNOSIS — E11.9 TYPE 2 DIABETES MELLITUS WITHOUT COMPLICATION (H): ICD-10-CM

## 2021-09-15 DIAGNOSIS — E78.5 HYPERLIPIDEMIA: ICD-10-CM

## 2021-09-15 RX ORDER — METFORMIN HCL 500 MG
TABLET, EXTENDED RELEASE 24 HR ORAL
Qty: 360 TABLET | Refills: 1 | Status: SHIPPED | OUTPATIENT
Start: 2021-09-15 | End: 2022-03-09

## 2021-09-15 RX ORDER — ATORVASTATIN CALCIUM 40 MG/1
40 TABLET, FILM COATED ORAL DAILY
Qty: 90 TABLET | Refills: 1 | OUTPATIENT
Start: 2021-09-15

## 2021-09-15 RX ORDER — ATORVASTATIN CALCIUM 40 MG/1
40 TABLET, FILM COATED ORAL DAILY
Qty: 90 TABLET | Refills: 0 | Status: SHIPPED | OUTPATIENT
Start: 2021-09-15 | End: 2022-01-13

## 2021-09-15 RX ORDER — METFORMIN HCL 500 MG
TABLET, EXTENDED RELEASE 24 HR ORAL
Qty: 360 TABLET | Refills: 1 | OUTPATIENT
Start: 2021-09-15

## 2021-09-15 NOTE — TELEPHONE ENCOUNTER
"Declining refill, repeat request  atorvastatin (LIPITOR) 40 MG tablet  Last Written Prescription Date:  09/15/2021  Last Fill Quantity: 90,  # refills: 0   Last office visit provider:  06/21/2021   metFORMIN (GLUCOPHAGE-XR) 500 MG 24 hr tablet  Last Written Prescription Date:  09/15/2021  Last Fill Quantity: 360,  # refills: 1   Last office visit provider:  06/21/2021       Requested Prescriptions   Pending Prescriptions Disp Refills     atorvastatin (LIPITOR) 40 MG tablet 90 tablet 1     Sig: Take 1 tablet (40 mg) by mouth daily       Statins Protocol Passed - 9/15/2021 10:25 AM        Passed - LDL on file in past 12 months     Recent Labs   Lab Test 10/05/20  0832   LDL 54             Passed - No abnormal creatine kinase in past 12 months     No lab results found.             Passed - Recent (12 mo) or future (30 days) visit within the authorizing provider's specialty     Patient has had an office visit with the authorizing provider or a provider within the authorizing providers department within the previous 12 mos or has a future within next 30 days. See \"Patient Info\" tab in inbasket, or \"Choose Columns\" in Meds & Orders section of the refill encounter.              Passed - Medication is active on med list        Passed - Patient is age 18 or older           metFORMIN (GLUCOPHAGE-XR) 500 MG 24 hr tablet 360 tablet 1       Biguanide Agents Passed - 9/15/2021 10:25 AM        Passed - Patient is age 10 or older        Passed - Patient has documented A1c within the specified period of time.     If HgbA1C is 8 or greater, it needs to be on file within the past 3 months.  If less than 8, must be on file within the past 6 months.     Recent Labs   Lab Test 06/21/21  1024   A1C 7.9*             Passed - Patient's CR is NOT>1.4 OR Patient's EGFR is NOT<45 within past 12 mos.     Recent Labs   Lab Test 06/21/21  1024   GFRESTIMATED >60   GFRESTBLACK >60       Recent Labs   Lab Test 06/21/21  1024   CR 1.10             " "Passed - Patient does NOT have a diagnosis of CHF.        Passed - Medication is active on med list        Passed - Recent (6 mo) or future (30 days) visit within the authorizing provider's specialty     Patient had office visit in the last 6 months or has a visit in the next 30 days with authorizing provider or within the authorizing provider's specialty.  See \"Patient Info\" tab in inbasket, or \"Choose Columns\" in Meds & Orders section of the refill encounter.                 Mariposa Reeves RN 09/15/21 2:21 PM  "

## 2021-09-15 NOTE — TELEPHONE ENCOUNTER
"Last Written Prescription Date:  1/5/21  Last Fill Quantity: 90/360,  # refills: 1   Last office visit provider:  6/21/21     Requested Prescriptions   Pending Prescriptions Disp Refills     atorvastatin (LIPITOR) 40 MG tablet 90 tablet 1     Sig: Take 1 tablet (40 mg) by mouth daily       Statins Protocol Passed - 9/14/2021  3:00 PM        Passed - LDL on file in past 12 months     Recent Labs   Lab Test 10/05/20  0832   LDL 54             Passed - No abnormal creatine kinase in past 12 months     No lab results found.             Passed - Recent (12 mo) or future (30 days) visit within the authorizing provider's specialty     Patient has had an office visit with the authorizing provider or a provider within the authorizing providers department within the previous 12 mos or has a future within next 30 days. See \"Patient Info\" tab in inbasket, or \"Choose Columns\" in Meds & Orders section of the refill encounter.              Passed - Medication is active on med list        Passed - Patient is age 18 or older           metFORMIN (GLUCOPHAGE-XR) 500 MG 24 hr tablet 360 tablet 1       Biguanide Agents Passed - 9/14/2021  3:00 PM        Passed - Patient is age 10 or older        Passed - Patient has documented A1c within the specified period of time.     If HgbA1C is 8 or greater, it needs to be on file within the past 3 months.  If less than 8, must be on file within the past 6 months.     Recent Labs   Lab Test 06/21/21  1024   A1C 7.9*             Passed - Patient's CR is NOT>1.4 OR Patient's EGFR is NOT<45 within past 12 mos.     Recent Labs   Lab Test 06/21/21  1024   GFRESTIMATED >60   GFRESTBLACK >60       Recent Labs   Lab Test 06/21/21  1024   CR 1.10             Passed - Patient does NOT have a diagnosis of CHF.        Passed - Medication is active on med list        Passed - Recent (6 mo) or future (30 days) visit within the authorizing provider's specialty     Patient had office visit in the last 6 months or " "has a visit in the next 30 days with authorizing provider or within the authorizing provider's specialty.  See \"Patient Info\" tab in inbasket, or \"Choose Columns\" in Meds & Orders section of the refill encounter.                 Darnell Camarillo RN 09/15/21 10:45 AM  "

## 2021-09-20 ENCOUNTER — OFFICE VISIT (OUTPATIENT)
Dept: FAMILY MEDICINE | Facility: CLINIC | Age: 76
End: 2021-09-20
Payer: MEDICARE

## 2021-09-20 VITALS
BODY MASS INDEX: 33.99 KG/M2 | SYSTOLIC BLOOD PRESSURE: 138 MMHG | HEIGHT: 65 IN | HEART RATE: 76 BPM | DIASTOLIC BLOOD PRESSURE: 80 MMHG | WEIGHT: 204 LBS | OXYGEN SATURATION: 98 %

## 2021-09-20 DIAGNOSIS — N40.1 BENIGN PROSTATIC HYPERPLASIA WITH NOCTURIA: ICD-10-CM

## 2021-09-20 DIAGNOSIS — I10 ESSENTIAL HYPERTENSION: ICD-10-CM

## 2021-09-20 DIAGNOSIS — E11.29 TYPE 2 DIABETES MELLITUS WITH MICROALBUMINURIA, WITHOUT LONG-TERM CURRENT USE OF INSULIN (H): ICD-10-CM

## 2021-09-20 DIAGNOSIS — E78.2 MIXED HYPERLIPIDEMIA: ICD-10-CM

## 2021-09-20 DIAGNOSIS — Z00.00 ENCOUNTER FOR MEDICARE ANNUAL WELLNESS EXAM: Primary | ICD-10-CM

## 2021-09-20 DIAGNOSIS — R35.1 BENIGN PROSTATIC HYPERPLASIA WITH NOCTURIA: ICD-10-CM

## 2021-09-20 DIAGNOSIS — R41.3 MEMORY LOSS: ICD-10-CM

## 2021-09-20 DIAGNOSIS — R80.9 TYPE 2 DIABETES MELLITUS WITH MICROALBUMINURIA, WITHOUT LONG-TERM CURRENT USE OF INSULIN (H): ICD-10-CM

## 2021-09-20 DIAGNOSIS — R80.9 MICROALBUMINURIA: ICD-10-CM

## 2021-09-20 LAB
ALBUMIN SERPL-MCNC: 4 G/DL (ref 3.5–5)
ALP SERPL-CCNC: 61 U/L (ref 45–120)
ALT SERPL W P-5'-P-CCNC: 25 U/L (ref 0–45)
ANION GAP SERPL CALCULATED.3IONS-SCNC: 13 MMOL/L (ref 5–18)
AST SERPL W P-5'-P-CCNC: 24 U/L (ref 0–40)
BILIRUB SERPL-MCNC: 0.7 MG/DL (ref 0–1)
BUN SERPL-MCNC: 14 MG/DL (ref 8–28)
CALCIUM SERPL-MCNC: 10.2 MG/DL (ref 8.5–10.5)
CHLORIDE BLD-SCNC: 99 MMOL/L (ref 98–107)
CHOLEST SERPL-MCNC: 129 MG/DL
CO2 SERPL-SCNC: 27 MMOL/L (ref 22–31)
CREAT SERPL-MCNC: 1.31 MG/DL (ref 0.7–1.3)
FASTING STATUS PATIENT QL REPORTED: YES
GFR SERPL CREATININE-BSD FRML MDRD: 54 ML/MIN/1.73M2
GLUCOSE BLD-MCNC: 269 MG/DL (ref 70–125)
HBA1C MFR BLD: 7.7 % (ref 0–5.6)
HDLC SERPL-MCNC: 40 MG/DL
LDLC SERPL CALC-MCNC: 56 MG/DL
POTASSIUM BLD-SCNC: 4.1 MMOL/L (ref 3.5–5)
PROT SERPL-MCNC: 7.7 G/DL (ref 6–8)
SODIUM SERPL-SCNC: 139 MMOL/L (ref 136–145)
TRIGL SERPL-MCNC: 166 MG/DL

## 2021-09-20 PROCEDURE — G0438 PPPS, INITIAL VISIT: HCPCS | Performed by: FAMILY MEDICINE

## 2021-09-20 PROCEDURE — 90662 IIV NO PRSV INCREASED AG IM: CPT | Performed by: FAMILY MEDICINE

## 2021-09-20 PROCEDURE — 80053 COMPREHEN METABOLIC PANEL: CPT | Performed by: FAMILY MEDICINE

## 2021-09-20 PROCEDURE — 80061 LIPID PANEL: CPT | Performed by: FAMILY MEDICINE

## 2021-09-20 PROCEDURE — 36415 COLL VENOUS BLD VENIPUNCTURE: CPT | Performed by: FAMILY MEDICINE

## 2021-09-20 PROCEDURE — G0008 ADMIN INFLUENZA VIRUS VAC: HCPCS | Performed by: FAMILY MEDICINE

## 2021-09-20 PROCEDURE — 83036 HEMOGLOBIN GLYCOSYLATED A1C: CPT | Performed by: FAMILY MEDICINE

## 2021-09-20 ASSESSMENT — MIFFLIN-ST. JEOR: SCORE: 1590.34

## 2021-09-20 ASSESSMENT — ACTIVITIES OF DAILY LIVING (ADL): CURRENT_FUNCTION: NO ASSISTANCE NEEDED

## 2021-09-20 NOTE — PATIENT INSTRUCTIONS
Patient Education   Personalized Prevention Plan  You are due for the preventive services outlined below.  Your care team is available to assist you in scheduling these services.  If you have already completed any of these items, please share that information with your care team to update in your medical record.  Health Maintenance Due   Topic Date Due     ANNUAL REVIEW OF HM ORDERS  Never done     AORTIC ANEURYSM SCREENING (SYSTEM ASSIGNED)  Never done     Flu Vaccine (1) 09/01/2021     Cholesterol Lab  10/05/2021

## 2021-09-20 NOTE — PROGRESS NOTES
"SUBJECTIVE:   Vashti King is a 75 year old male who presents for Preventive Visit.    Patient has a wellness visit today    He has underlying diabetes he is on Metformin and glipizide sugars are in the low 100s.    A1c in June was 7.9 we will recheck on that also his lipid and CMP.    Patient has had a PSA level of 0.5 he does have nocturia this was back in January microalbumin was positive at that time regarding his diabetes also.  He has nocturia x2 on average    Patient had a Cologuard in March 2020 no get rechecked in March 2023    Blood pressure controlled with diltiazem hydrochlorothiazide lisinopril and metoprolol.    Patient is on atorvastatin 40 mg a day await.[email or web address]    Patient's mini cog was 0 out of 5 he has had increased memory issues over the past year.  He states that other people told him this is well    We will have him see neurology.    Patient did see ophthalmology this summer.    He is due for flu shot and this was given today he has had his COVID-19 vaccinations.    He was given advance care directive to fill out.    No additional concern or issue.    Patient has been advised of split billing requirements and indicates understanding: Yes   Are you in the first 12 months of your Medicare coverage?  Have seen eye  couple months ago.    Healthy Habits:     In general, how would you rate your overall health?  Good    Frequency of exercise:  2-3 days/week    Duration of exercise:  45-60 minutes    Do you usually eat at least 4 servings of fruit and vegetables a day, include whole grains    & fiber and avoid regularly eating high fat or \"junk\" foods?  Yes    Taking medications regularly:  No    Medication side effects:  None    Ability to successfully perform activities of daily living:  No assistance needed    Home Safety:  No safety concerns identified    Hearing Impairment:  No hearing concerns    In the past 6 months, have you been bothered by leaking of urine?  No    In general, how would " you rate your overall mental or emotional health?  Good      PHQ-2 Total Score: 0    Additional concerns today:  No    Do you feel safe in your environment? Yes    Have you ever done Advance Care Planning? (For example, a Health Directive, POLST, or a discussion with a medical provider or your loved ones about your wishes): Yes, patient states has an Advance Care Planning document and will bring a copy to the clinic.    Wears hearing aides  Fall risk  Fallen 2 or more times in the past year?: No  Any fall with injury in the past year?: No    Cognitive Screening   1) Repeat 3 items (Leader, Season, Table)    2) Clock draw: ABNORMAL   3) 3 item recall: Recalls NO objects   Results: ABNORMAL clock, 1-2 items recalled: PROBABLE COGNITIVE IMPAIRMENT, **INFORM PROVIDER**    Mini-CogTM Copyright S [employer]. Licensed by the author for use in [facility]; reprinted with permission (geovanni@[facility]). All rights reserved.      Do you have sleep apnea, excessive snoring or daytime drowsiness?: no    Reviewed and updated as needed this visit by clinical staff  Tobacco  Allergies  Meds              Reviewed and updated as needed this visit by Provider  Tobacco               Social History     Tobacco Use     Smoking status: Never Smoker     Smokeless tobacco: Never Used   Substance Use Topics     Alcohol use: Not on file     If you drink alcohol do you typically have >3 drinks per day or >7 drinks per week? No    Alcohol Use 9/20/2021   Prescreen: >3 drinks/day or >7 drinks/week? No   Prescreen: >3 drinks/day or >7 drinks/week? -         PROBLEMS TO ADD ON...    Current providers sharing in care for this patient include:   Patient Care Team:  Grover Madrigal MD as PCP - General (Family Practice)  Grover Madrigal MD as Assigned PCP  Ophthalmology      The following health maintenance items are reviewed in Epic and correct as of today:  Health Maintenance Due   Topic Date Due     ANNUAL REVIEW OF HM ORDERS  Never done  "    AORTIC ANEURYSM SCREENING (SYSTEM ASSIGNED)  Never done     LIPID  10/05/2021             Review of Systems  10 point review of systems positive as outlined above otherwise negative    OBJECTIVE:   /80 (BP Location: Right arm, Patient Position: Sitting, Cuff Size: Adult Regular)   Pulse 76   Ht 1.64 m (5' 4.57\")   Wt 92.5 kg (204 lb)   SpO2 98%   BMI 34.40 kg/m   Estimated body mass index is 34.4 kg/m  as calculated from the following:    Height as of this encounter: 1.64 m (5' 4.57\").    Weight as of this encounter: 92.5 kg (204 lb).  Physical Exam  General appearance no acute distress    HEENT: Neck nontender pupils react normally    Cranial nerves intact    Lungs clear no rales or rhonchi heart regular S1-S2 no murmur    No focal weakness or numbness    Abdomen is soft nontender no masses    Extremities without edema no numbness through the feet    Normal pulses.    Skin is normal no rashes    Labs: CMP A1c and lipid pending.      ASSESSMENT / PLAN:       ICD-10-CM    1. Encounter for Medicare annual wellness exam  Z00.00    2. Type 2 diabetes mellitus with microalbuminuria, without long-term current use of insulin (H)  E11.29 Comprehensive metabolic panel (BMP + Alb, Alk Phos, ALT, AST, Total. Bili, TP)    R80.9 Hemoglobin A1c   3. Microalbuminuria  R80.9 Comprehensive metabolic panel (BMP + Alb, Alk Phos, ALT, AST, Total. Bili, TP)   4. Essential hypertension  I10 Comprehensive metabolic panel (BMP + Alb, Alk Phos, ALT, AST, Total. Bili, TP)   5. Benign prostatic hyperplasia with nocturia  N40.1     R35.1    6. Mixed hyperlipidemia  E78.2 Comprehensive metabolic panel (BMP + Alb, Alk Phos, ALT, AST, Total. Bili, TP)     Lipid Profile (Chol, Trig, HDL, LDL calc)   7. Memory loss  R41.3 Adult Neurology Referral       Patient has annual wellness exam today, risk factors include his memory issues.  Otherwise seems to be stable no concerns regarding falling    Diabetes mellitus has been relatively " "well controlled await A1c, blood sugars have been good stay on the Metformin and glipizide.    Mixed hyperlipidemia on atorvastatin 40 mg a day await lipid panel.    Hypertension controlled, continue diltiazem I do chlorothiazide lisinopril and metoprolol.    BPH controlled on tamsulosin.    Memory loss issues we will have him see the neurologist as outlined.    Immunization update with flu shot today    Patient be contacted regarding lab results and discuss follow-up, likely 3 to 4 months.        Estimated body mass index is 34.4 kg/m  as calculated from the following:    Height as of this encounter: 1.64 m (5' 4.57\").    Weight as of this encounter: 92.5 kg (204 lb).    Weight management plan: Discussed healthy diet and exercise guidelines    He reports that he has never smoked. He has never used smokeless tobacco.      Appropriate preventive services were discussed with this patient, including applicable screening as appropriate for cardiovascular disease, diabetes, osteopenia/osteoporosis, and glaucoma.  As appropriate for age/gender, discussed screening for colorectal cancer, prostate cancer, breast cancer, and cervical cancer. Checklist reviewing preventive services available has been given to the patient.    Reviewed patients plan of care and provided an AVS.     Counseling Resources:  ATP IV Guidelines  Pooled Cohorts Equation Calculator  Breast Cancer Risk Calculator  Breast Cancer: Medication to Reduce Risk  FRAX Risk Assessment  ICSI Preventive Guidelines  Dietary Guidelines for Americans, 2010  USDA's MyPlate  ASA Prophylaxis  Lung CA Screening    Grover Madrigal MD  [facility]    Identified Health Risks:  "

## 2021-09-21 ENCOUNTER — TELEPHONE (OUTPATIENT)
Dept: FAMILY MEDICINE | Facility: CLINIC | Age: 76
End: 2021-09-21

## 2021-09-21 RX ORDER — HYDROCHLOROTHIAZIDE 12.5 MG/1
12.5 TABLET ORAL DAILY
Qty: 90 TABLET | Refills: 1 | Status: SHIPPED | OUTPATIENT
Start: 2021-09-21 | End: 2022-03-09

## 2021-09-21 RX ORDER — TAMSULOSIN HYDROCHLORIDE 0.4 MG/1
0.4 CAPSULE ORAL
Qty: 90 CAPSULE | Refills: 1 | Status: SHIPPED | OUTPATIENT
Start: 2021-09-21 | End: 2023-05-09

## 2021-09-21 RX ORDER — DILTIAZEM HYDROCHLORIDE 240 MG/1
240 CAPSULE, COATED, EXTENDED RELEASE ORAL DAILY
Qty: 90 CAPSULE | Refills: 1 | Status: SHIPPED | OUTPATIENT
Start: 2021-09-21 | End: 2022-03-09

## 2021-09-21 RX ORDER — GLIPIZIDE 5 MG/1
TABLET ORAL
Qty: 360 TABLET | Refills: 1 | Status: SHIPPED | OUTPATIENT
Start: 2021-09-21 | End: 2022-09-28

## 2021-09-21 RX ORDER — LISINOPRIL 40 MG/1
40 TABLET ORAL DAILY
Qty: 90 TABLET | Refills: 0 | Status: SHIPPED | OUTPATIENT
Start: 2021-09-21 | End: 2022-01-24

## 2021-09-21 NOTE — TELEPHONE ENCOUNTER
Patient called asking can we send all of his RX to pharmacy as a refill. Can we get this done if appropriate ? [facility] [address]

## 2021-09-21 NOTE — TELEPHONE ENCOUNTER
Relayed message to patient, will complete task.       ----- Message from Grover Madrigal MD sent at 9/21/2021 12:52 PM CDT -----  Please contact this patient, let him know that the diabetes level was okay the A1c was 7.4The cholesterol panel also looked okay with a bad cholesterol at 56.No change in medication.The kidney function was a little bit down.  Make sure to avoid taking any NSAIDs, no Advil Aleve ibuprofen etc. okay to use Tylenol.Make sure to drink 8 to 10 glasses of water a day.Follow-up in 3 months for recheck on the kidney function (BMP) and recheck the A1c.Please schedule a follow-up visit for him

## 2021-09-23 DIAGNOSIS — I10 ESSENTIAL HYPERTENSION: ICD-10-CM

## 2021-09-24 RX ORDER — DILTIAZEM HYDROCHLORIDE 240 MG/1
240 CAPSULE, COATED, EXTENDED RELEASE ORAL DAILY
Qty: 90 CAPSULE | Refills: 1 | OUTPATIENT
Start: 2021-09-24

## 2021-09-29 DIAGNOSIS — I10 ESSENTIAL HYPERTENSION: ICD-10-CM

## 2021-09-30 RX ORDER — DILTIAZEM HYDROCHLORIDE 240 MG/1
240 CAPSULE, COATED, EXTENDED RELEASE ORAL DAILY
Qty: 90 CAPSULE | Refills: 1 | OUTPATIENT
Start: 2021-09-30

## 2021-09-30 NOTE — TELEPHONE ENCOUNTER
"Routing refill request to provider for review/approval because:  Labs out of range:  CR  Refill requested too early.     Last Written Prescription Date:  9/21/21  Last Fill Quantity: 90,  # refills: 1   Last office visit provider:  9/20/21     Requested Prescriptions   Pending Prescriptions Disp Refills     diltiazem ER COATED BEADS (CARDIZEM CD/CARTIA XT) 240 MG 24 hr capsule 90 capsule 1     Sig: Take 1 capsule (240 mg) by mouth daily       Calcium Channel Blockers Protocol  Failed - 9/29/2021  9:25 AM        Failed - Normal serum creatinine on file in past 12 months     Recent Labs   Lab Test 09/20/21  1102   CR 1.31*       Ok to refill medication if creatinine is low          Passed - Blood pressure under 140/90 in past 12 months     BP Readings from Last 3 Encounters:   09/20/21 138/80   06/21/21 120/71   03/18/21 (!) 153/96                 Passed - Normal ALT in past 12 months     Recent Labs   Lab Test 09/20/21  1102   ALT 25             Passed - Recent (12 mo) or future (30 days) visit within the authorizing provider's specialty     Patient has had an office visit with the authorizing provider or a provider within the authorizing providers department within the previous 12 mos or has a future within next 30 days. See \"Patient Info\" tab in inbasket, or \"Choose Columns\" in Meds & Orders section of the refill encounter.              Passed - Medication is active on med list        Passed - Patient is age 18 or older             Kaleb Chaney RN 09/30/21 2:07 PM  "

## 2022-01-10 DIAGNOSIS — E78.5 HYPERLIPIDEMIA: ICD-10-CM

## 2022-01-13 RX ORDER — ATORVASTATIN CALCIUM 40 MG/1
40 TABLET, FILM COATED ORAL DAILY
Qty: 90 TABLET | Refills: 0 | Status: SHIPPED | OUTPATIENT
Start: 2022-01-13 | End: 2022-05-03

## 2022-01-21 DIAGNOSIS — I10 ESSENTIAL HYPERTENSION: ICD-10-CM

## 2022-01-24 RX ORDER — LISINOPRIL 40 MG/1
40 TABLET ORAL DAILY
Qty: 90 TABLET | Refills: 1 | Status: SHIPPED | OUTPATIENT
Start: 2022-01-24 | End: 2022-08-12

## 2022-01-24 NOTE — TELEPHONE ENCOUNTER
"Routing refill request to provider for review/approval because:  Labs out of range:  Serum Creatinine.    Last Written Prescription Date:  09/21/2021  Last Fill Quantity: 90,  # refills: 0   Last office visit provider:   09/20/2021 with Dr Madrigal.    Requested Prescriptions   Pending Prescriptions Disp Refills     lisinopril (ZESTRIL) 40 MG tablet 90 tablet 0     Sig: Take 1 tablet (40 mg) by mouth daily       ACE Inhibitors (Including Combos) Protocol Failed - 1/21/2022  9:23 AM        Failed - Normal serum creatinine on file in past 12 months     Recent Labs   Lab Test 09/20/21  1102   CR 1.31*       Ok to refill medication if creatinine is low          Passed - Blood pressure under 140/90 in past 12 months     BP Readings from Last 3 Encounters:   09/20/21 138/80   06/21/21 120/71   03/18/21 (!) 153/96                 Passed - Recent (12 mo) or future (30 days) visit within the authorizing provider's specialty     Patient has had an office visit with the authorizing provider or a provider within the authorizing providers department within the previous 12 mos or has a future within next 30 days. See \"Patient Info\" tab in inbasket, or \"Choose Columns\" in Meds & Orders section of the refill encounter.              Passed - Medication is active on med list        Passed - Patient is age 18 or older        Passed - Normal serum potassium on file in past 12 months     Recent Labs   Lab Test 09/20/21  1102   POTASSIUM 4.1                  Pam Cabral 01/23/22 8:38 PM  "

## 2022-03-09 DIAGNOSIS — E11.9 TYPE 2 DIABETES MELLITUS WITHOUT COMPLICATION (H): ICD-10-CM

## 2022-03-09 DIAGNOSIS — I10 ESSENTIAL HYPERTENSION: ICD-10-CM

## 2022-03-09 RX ORDER — METFORMIN HCL 500 MG
TABLET, EXTENDED RELEASE 24 HR ORAL
Qty: 360 TABLET | Refills: 0 | Status: SHIPPED | OUTPATIENT
Start: 2022-03-09 | End: 2022-06-05

## 2022-03-09 RX ORDER — HYDROCHLOROTHIAZIDE 12.5 MG/1
12.5 TABLET ORAL DAILY
Qty: 90 TABLET | Refills: 1 | Status: SHIPPED | OUTPATIENT
Start: 2022-03-09 | End: 2022-10-13

## 2022-03-09 RX ORDER — DILTIAZEM HYDROCHLORIDE 240 MG/1
CAPSULE, COATED, EXTENDED RELEASE ORAL
Qty: 90 CAPSULE | Refills: 1 | Status: SHIPPED | OUTPATIENT
Start: 2022-03-09 | End: 2022-11-28

## 2022-03-09 NOTE — TELEPHONE ENCOUNTER
"Last Written Prescription Date:  9/15/21  Last Fill Quantity: 360,  # refills: 1   Last office visit provider:  9/20/21     Requested Prescriptions   Pending Prescriptions Disp Refills     metFORMIN (GLUCOPHAGE-XR) 500 MG 24 hr tablet [Pharmacy Med Name: METFORMIN HCL  MG TABLET] 360 tablet 1     Sig: TAKE 2 TABLETS BY MOUTH TWICE A DAY WITH FOOD       Biguanide Agents Passed - 3/9/2022  3:02 PM        Passed - Patient is age 10 or older        Passed - Patient has documented A1c within the specified period of time.     If HgbA1C is 8 or greater, it needs to be on file within the past 3 months.  If less than 8, must be on file within the past 6 months.     Recent Labs   Lab Test 09/20/21  1102   A1C 7.7*             Passed - Patient's CR is NOT>1.4 OR Patient's EGFR is NOT<45 within past 12 mos.     Recent Labs   Lab Test 09/20/21  1102 06/21/21  1024   GFRESTIMATED 54* >60   GFRESTBLACK  --  >60       Recent Labs   Lab Test 09/20/21  1102   CR 1.31*             Passed - Patient does NOT have a diagnosis of CHF.        Passed - Medication is active on med list        Passed - Recent (6 mo) or future (30 days) visit within the authorizing provider's specialty     Patient had office visit in the last 6 months or has a visit in the next 30 days with authorizing provider or within the authorizing provider's specialty.  See \"Patient Info\" tab in inbasket, or \"Choose Columns\" in Meds & Orders section of the refill encounter.               diltiazem ER COATED BEADS (CARDIZEM CD/CARTIA XT) 240 MG 24 hr capsule [Pharmacy Med Name: DILTIAZEM 24H ER(CD) 240 MG CP] 90 capsule 1     Sig: TAKE 1 CAPSULE BY MOUTH EVERY DAY       Calcium Channel Blockers Protocol  Failed - 3/9/2022  2:59 PM        Failed - Normal serum creatinine on file in past 12 months     Recent Labs   Lab Test 09/20/21  1102   CR 1.31*       Ok to refill medication if creatinine is low          Passed - Blood pressure under 140/90 in past 12 months     " "BP Readings from Last 3 Encounters:   09/20/21 138/80   06/21/21 120/71   03/18/21 (!) 153/96                 Passed - Normal ALT in past 12 months     Recent Labs   Lab Test 09/20/21  1102   ALT 25             Passed - Recent (12 mo) or future (30 days) visit within the authorizing provider's specialty     Patient has had an office visit with the authorizing provider or a provider within the authorizing providers department within the previous 12 mos or has a future within next 30 days. See \"Patient Info\" tab in inbasket, or \"Choose Columns\" in Meds & Orders section of the refill encounter.              Passed - Medication is active on med list        Passed - Patient is age 18 or older           hydrochlorothiazide (HYDRODIURIL) 12.5 MG tablet [Pharmacy Med Name: HYDROCHLOROTHIAZIDE 12.5 MG TB] 90 tablet 1     Sig: TAKE 1 TABLET (12.5 MG) BY MOUTH DAILY       Diuretics (Including Combos) Protocol Failed - 3/9/2022  2:59 PM        Failed - Normal serum creatinine on file in past 12 months     Recent Labs   Lab Test 09/20/21  1102   CR 1.31*              Passed - Blood pressure under 140/90 in past 12 months     BP Readings from Last 3 Encounters:   09/20/21 138/80   06/21/21 120/71   03/18/21 (!) 153/96                 Passed - Recent (12 mo) or future (30 days) visit within the authorizing provider's specialty     Patient has had an office visit with the authorizing provider or a provider within the authorizing providers department within the previous 12 mos or has a future within next 30 days. See \"Patient Info\" tab in inbasket, or \"Choose Columns\" in Meds & Orders section of the refill encounter.              Passed - Medication is active on med list        Passed - Patient is age 18 or older        Passed - Normal serum potassium on file in past 12 months     Recent Labs   Lab Test 09/20/21  1102   POTASSIUM 4.1                    Passed - Normal serum sodium on file in past 12 months     Recent Labs   Lab Test " 09/20/21  1102                      Darnell Camarillo RN 03/09/22 3:02 PM

## 2022-03-09 NOTE — TELEPHONE ENCOUNTER
"Routing refill request to provider for review/approval because:  Labs out of range:  Creatinine    Last Written Prescription Date:  9/21/21  Last Fill Quantity: 90,  # refills: 1   Last office visit provider:  9/20/21     Requested Prescriptions   Pending Prescriptions Disp Refills     diltiazem ER COATED BEADS (CARDIZEM CD/CARTIA XT) 240 MG 24 hr capsule [Pharmacy Med Name: DILTIAZEM 24H ER(CD) 240 MG CP] 90 capsule 1     Sig: TAKE 1 CAPSULE BY MOUTH EVERY DAY       Calcium Channel Blockers Protocol  Failed - 3/9/2022  2:59 PM        Failed - Normal serum creatinine on file in past 12 months     Recent Labs   Lab Test 09/20/21  1102   CR 1.31*       Ok to refill medication if creatinine is low          Passed - Blood pressure under 140/90 in past 12 months     BP Readings from Last 3 Encounters:   09/20/21 138/80   06/21/21 120/71   03/18/21 (!) 153/96                 Passed - Normal ALT in past 12 months     Recent Labs   Lab Test 09/20/21  1102   ALT 25             Passed - Recent (12 mo) or future (30 days) visit within the authorizing provider's specialty     Patient has had an office visit with the authorizing provider or a provider within the authorizing providers department within the previous 12 mos or has a future within next 30 days. See \"Patient Info\" tab in inbasket, or \"Choose Columns\" in Meds & Orders section of the refill encounter.              Passed - Medication is active on med list        Passed - Patient is age 18 or older           hydrochlorothiazide (HYDRODIURIL) 12.5 MG tablet [Pharmacy Med Name: HYDROCHLOROTHIAZIDE 12.5 MG TB] 90 tablet 1     Sig: TAKE 1 TABLET (12.5 MG) BY MOUTH DAILY       Diuretics (Including Combos) Protocol Failed - 3/9/2022  2:59 PM        Failed - Normal serum creatinine on file in past 12 months     Recent Labs   Lab Test 09/20/21  1102   CR 1.31*              Passed - Blood pressure under 140/90 in past 12 months     BP Readings from Last 3 Encounters:   09/20/21 " "138/80   06/21/21 120/71   03/18/21 (!) 153/96                 Passed - Recent (12 mo) or future (30 days) visit within the authorizing provider's specialty     Patient has had an office visit with the authorizing provider or a provider within the authorizing providers department within the previous 12 mos or has a future within next 30 days. See \"Patient Info\" tab in inbasket, or \"Choose Columns\" in Meds & Orders section of the refill encounter.              Passed - Medication is active on med list        Passed - Patient is age 18 or older        Passed - Normal serum potassium on file in past 12 months     Recent Labs   Lab Test 09/20/21  1102   POTASSIUM 4.1                    Passed - Normal serum sodium on file in past 12 months     Recent Labs   Lab Test 09/20/21  1102                  Signed Prescriptions Disp Refills    metFORMIN (GLUCOPHAGE-XR) 500 MG 24 hr tablet 360 tablet 0     Sig: TAKE 2 TABLETS BY MOUTH TWICE A DAY WITH FOOD       Biguanide Agents Passed - 3/9/2022  3:02 PM        Passed - Patient is age 10 or older        Passed - Patient has documented A1c within the specified period of time.     If HgbA1C is 8 or greater, it needs to be on file within the past 3 months.  If less than 8, must be on file within the past 6 months.     Recent Labs   Lab Test 09/20/21  1102   A1C 7.7*             Passed - Patient's CR is NOT>1.4 OR Patient's EGFR is NOT<45 within past 12 mos.     Recent Labs   Lab Test 09/20/21  1102 06/21/21  1024   GFRESTIMATED 54* >60   GFRESTBLACK  --  >60       Recent Labs   Lab Test 09/20/21  1102   CR 1.31*             Passed - Patient does NOT have a diagnosis of CHF.        Passed - Medication is active on med list        Passed - Recent (6 mo) or future (30 days) visit within the authorizing provider's specialty     Patient had office visit in the last 6 months or has a visit in the next 30 days with authorizing provider or within the authorizing provider's specialty.  " "See \"Patient Info\" tab in inbasket, or \"Choose Columns\" in Meds & Orders section of the refill encounter.                 Darnell Camarillo RN 03/09/22 3:03 PM  "

## 2022-04-02 ENCOUNTER — HEALTH MAINTENANCE LETTER (OUTPATIENT)
Age: 77
End: 2022-04-02

## 2022-04-25 ENCOUNTER — OFFICE VISIT (OUTPATIENT)
Dept: FAMILY MEDICINE | Facility: CLINIC | Age: 77
End: 2022-04-25
Payer: MEDICARE

## 2022-04-25 VITALS
DIASTOLIC BLOOD PRESSURE: 88 MMHG | BODY MASS INDEX: 34.07 KG/M2 | SYSTOLIC BLOOD PRESSURE: 128 MMHG | RESPIRATION RATE: 18 BRPM | HEART RATE: 88 BPM | WEIGHT: 202 LBS

## 2022-04-25 DIAGNOSIS — E78.5 HYPERLIPIDEMIA, UNSPECIFIED HYPERLIPIDEMIA TYPE: ICD-10-CM

## 2022-04-25 DIAGNOSIS — R80.9 MICROALBUMINURIA: ICD-10-CM

## 2022-04-25 DIAGNOSIS — E11.29 TYPE 2 DIABETES MELLITUS WITH MICROALBUMINURIA, WITHOUT LONG-TERM CURRENT USE OF INSULIN (H): Primary | ICD-10-CM

## 2022-04-25 DIAGNOSIS — I10 ESSENTIAL HYPERTENSION: ICD-10-CM

## 2022-04-25 DIAGNOSIS — R80.9 TYPE 2 DIABETES MELLITUS WITH MICROALBUMINURIA, WITHOUT LONG-TERM CURRENT USE OF INSULIN (H): Primary | ICD-10-CM

## 2022-04-25 LAB
ANION GAP SERPL CALCULATED.3IONS-SCNC: 15 MMOL/L (ref 5–18)
BUN SERPL-MCNC: 13 MG/DL (ref 8–28)
CALCIUM SERPL-MCNC: 10 MG/DL (ref 8.5–10.5)
CHLORIDE BLD-SCNC: 98 MMOL/L (ref 98–107)
CO2 SERPL-SCNC: 26 MMOL/L (ref 22–31)
CREAT SERPL-MCNC: 1.42 MG/DL (ref 0.7–1.3)
CREAT UR-MCNC: 39 MG/DL
GFR SERPL CREATININE-BSD FRML MDRD: 52 ML/MIN/1.73M2
GLUCOSE BLD-MCNC: 343 MG/DL (ref 70–125)
HBA1C MFR BLD: 8.1 % (ref 0–5.6)
MICROALBUMIN UR-MCNC: 0.91 MG/DL (ref 0–1.99)
MICROALBUMIN/CREAT UR: 23.3 MG/G CR
POTASSIUM BLD-SCNC: 4 MMOL/L (ref 3.5–5)
SODIUM SERPL-SCNC: 139 MMOL/L (ref 136–145)

## 2022-04-25 PROCEDURE — 99214 OFFICE O/P EST MOD 30 MIN: CPT | Performed by: FAMILY MEDICINE

## 2022-04-25 PROCEDURE — 36415 COLL VENOUS BLD VENIPUNCTURE: CPT | Performed by: FAMILY MEDICINE

## 2022-04-25 PROCEDURE — 80048 BASIC METABOLIC PNL TOTAL CA: CPT | Performed by: FAMILY MEDICINE

## 2022-04-25 PROCEDURE — 83036 HEMOGLOBIN GLYCOSYLATED A1C: CPT | Performed by: FAMILY MEDICINE

## 2022-04-25 PROCEDURE — 82043 UR ALBUMIN QUANTITATIVE: CPT | Performed by: FAMILY MEDICINE

## 2022-04-25 RX ORDER — CARVEDILOL 25 MG/1
TABLET, FILM COATED ORAL
Qty: 100 STRIP | Refills: 3 | Status: SHIPPED | OUTPATIENT
Start: 2022-04-25 | End: 2022-10-19

## 2022-04-25 NOTE — PROGRESS NOTES
"Assessment & Plan        ICD-10-CM    1. Type 2 diabetes mellitus with microalbuminuria, without long-term current use of insulin (H)  E11.29 REVIEW OF HEALTH MAINTENANCE PROTOCOL ORDERS    R80.9 HEMOGLOBIN A1C     Basic metabolic panel  (Ca, Cl, CO2, Creat, Gluc, K, Na, BUN)     blood glucose (CONTOUR TEST) test strip   2. Microalbuminuria  R80.9 Albumin Random Urine Quantitative with Creat Ratio   3. Essential hypertension  I10    4. Hyperlipidemia, unspecified hyperlipidemia type  E78.5    5. Type 2 diabetes mellitus without complication (H)  E11.9         Diabetes mellitus stable continue on same glipizide and metformin check A1c    Microalbuminuria on lisinopril.  Recheck microalbumin    Patient takes diltiazem hydrochlorothiazide lisinopril metoprolol for blood pressure which is controlled.    He takes atorvastatin 40 mg a day for lipids, that is controlled as well    Up-to-date on immunizations.    Patient be contacted with lab results.    He will follow-up in 3 to 6 months he understands that I am retiring in July      Return in about 3 months (around 7/25/2022) for Follow up. for recheck.        Subjective:    This 76 year old male was seen today for follow-up.  Patient was seen back in September A1c was 7.7 LDL 56 GFR 54    We will recheck A1c microalbumin and BMP today    Patient is on metformin and glipizide.  Blood sugars are in the low 100s.    He is on diltiazem hydrochlorothiazide lisinopril and metoprolol blood pressure looks good.    He takes atorvastatin for lipids    He states he had a recent eye exam we will try to get the results on that.  He was told everything \"looks okay \".    Is up-to-date on immunizations.    No additional concern or issue    Review of Systems    10 point review of systems positive as outlined above otherwise negative    Current Outpatient Medications   Medication     aspirin (ASA) 81 MG EC tablet     atorvastatin (LIPITOR) 40 MG tablet     blood glucose (CONTOUR TEST) " "test strip     diltiazem ER COATED BEADS (CARDIZEM CD/CARTIA XT) 240 MG 24 hr capsule     generic lancets (FINGERSTIX LANCETS)     glipiZIDE (GLUCOTROL) 5 MG tablet     hydrochlorothiazide (HYDRODIURIL) 12.5 MG tablet     lisinopril (ZESTRIL) 40 MG tablet     loratadine (CLARITIN) 10 mg tablet     metFORMIN (GLUCOPHAGE-XR) 500 MG 24 hr tablet     metoprolol succinate ER (TOPROL-XL) 50 MG 24 hr tablet     pen needle, diabetic 32 gauge x 1/4\" Ndle     tamsulosin (FLOMAX) 0.4 MG capsule     No current facility-administered medications for this visit.       Objective    Vitals: /88 (BP Location: Left arm, Patient Position: Sitting, Cuff Size: Adult Large)   Pulse 88   Resp 18   Wt 91.6 kg (202 lb)   BMI 34.07 kg/m    BMI= Body mass index is 34.07 kg/m .     Physical Exam    General appearance no acute distress    Vital signs are stable weight is still elevated at 202 I discussed trying to increase physical activity.  HEENT: Neck without tenderness oropharynx clear    Lungs are clear no rales or rhonchi heart is regular S1-S2.  No murmur    Skin is normal no rashes no peripheral edema no numbness through the feet.    Lab work pending BMP A1c microalbumin        Grover Madrigal MD   "

## 2022-04-26 ENCOUNTER — APPOINTMENT (OUTPATIENT)
Dept: INTERPRETER SERVICES | Facility: CLINIC | Age: 77
End: 2022-04-26
Payer: MEDICARE

## 2022-04-26 ENCOUNTER — TELEPHONE (OUTPATIENT)
Dept: FAMILY MEDICINE | Facility: CLINIC | Age: 77
End: 2022-04-26
Payer: MEDICARE

## 2022-04-26 NOTE — TELEPHONE ENCOUNTER
Unable Leave message due to no inbox x 1. Please review message thread below and advise the patient as indicated. Please schedule if necessary or indicated in message thread.

## 2022-04-26 NOTE — TELEPHONE ENCOUNTER
----- Message from Grover Madrigal MD sent at 4/26/2022  8:04 AM CDT -----  Please contact this patient, let her know the protein in the urine looked better stay on all the same blood pressure medicines including lisinopril.    The kidney function was slightly worse with a GFR 52.    A1c was up to 8.1.  Patient needs to improve diet, increase fluids, increase exercise, try to lose about 10 to 15 pounds.    He should follow-up with one of the other providers in 3 months to recheck A1c and renal function.  He knows that I am retiring in early July

## 2022-04-26 NOTE — LETTER
April 29, 2022      Vashti King  [address] [place] [address]        Dear ,    We are writing to inform you of your test results.    The protein in the urine looked better, stay on all the same blood pressure medicines including lisinopril.     The kidney function was slightly worse with a GFR 52.     A1c was up to 8.1.  You need to improve diet, increase fluids, increase exercise, try to lose about 10 to 15 pounds.     You should follow-up with one of the other providers in 3 months to recheck A1c and renal function.    Resulted Orders   Albumin Random Urine Quantitative with Creat Ratio   Result Value Ref Range    Microalbumin Urine mg/dL 0.91 0.00 - 1.99 mg/dL    Creatinine Urine mg/dL 39 mg/dL    Microalbumin Urine mg/g Cr 23.3 (H) <=19.9 mg/g Cr    Narrative    Microalbumin, Random Urine   <2.0 mg/dL . . . . . . . . Normal   3.0-30.0 mg/dL . . . . . . Microalbuminuria   >30.0 mg/dL . . . . . .  . Clinical Proteinuria     Microalbumin/Creatinine Ratio, Random Urine   <20 mg/g . . . . .. . . . Normal    mg/g . . . . . . . Microalbuminuria   >300 mg/g . . . . . . . . Clinical Proteinuria   HEMOGLOBIN A1C   Result Value Ref Range    Hemoglobin A1C 8.1 (H) 0.0 - 5.6 %      Comment:      Normal <5.7%   Prediabetes 5.7-6.4%    Diabetes 6.5% or higher     Note: Adopted from ADA consensus guidelines.   Basic metabolic panel  (Ca, Cl, CO2, Creat, Gluc, K, Na, BUN)   Result Value Ref Range    Sodium 139 136 - 145 mmol/L    Potassium 4.0 3.5 - 5.0 mmol/L    Chloride 98 98 - 107 mmol/L    Carbon Dioxide (CO2) 26 22 - 31 mmol/L    Anion Gap 15 5 - 18 mmol/L    Urea Nitrogen 13 8 - 28 mg/dL    Creatinine 1.42 (H) 0.70 - 1.30 mg/dL    Calcium 10.0 8.5 - 10.5 mg/dL    Glucose 343 (H) 70 - 125 mg/dL    GFR Estimate 52 (L) >60 mL/min/1.73m2      Comment:      Effective December 21, 2021 eGFRcr in adults is calculated using the 2021 CKD-EPI creatinine equation which includes age and gender (Reji et al.,  NEJM, DOI: 10.1056/OXRXsb6064122)       If you have any questions or concerns, please call the clinic at the number listed above.       Sincerely,          Dr. Madrigal

## 2022-04-30 DIAGNOSIS — E78.5 HYPERLIPIDEMIA: ICD-10-CM

## 2022-05-03 RX ORDER — ATORVASTATIN CALCIUM 40 MG/1
TABLET, FILM COATED ORAL
Qty: 90 TABLET | Refills: 1 | Status: SHIPPED | OUTPATIENT
Start: 2022-05-03 | End: 2022-11-02

## 2022-05-03 NOTE — TELEPHONE ENCOUNTER
"Last Written Prescription Date:  1/13/22  Last Fill Quantity: 90,  # refills: 0   Last office visit provider:  4/25/22     Requested Prescriptions   Pending Prescriptions Disp Refills     atorvastatin (LIPITOR) 40 MG tablet [Pharmacy Med Name: ATORVASTATIN 40 MG TABLET] 90 tablet 0     Sig: TAKE 1 TABLET BY MOUTH DAILY       Statins Protocol Passed - 5/3/2022  8:48 AM        Passed - LDL on file in past 12 months     Recent Labs   Lab Test 09/20/21  1102   LDL 56             Passed - No abnormal creatine kinase in past 12 months     No lab results found.             Passed - Recent (12 mo) or future (30 days) visit within the authorizing provider's specialty     Patient has had an office visit with the authorizing provider or a provider within the authorizing providers department within the previous 12 mos or has a future within next 30 days. See \"Patient Info\" tab in inbasket, or \"Choose Columns\" in Meds & Orders section of the refill encounter.              Passed - Medication is active on med list        Passed - Patient is age 18 or older             Darnell Camarillo RN 05/03/22 8:48 AM    "

## 2022-06-04 DIAGNOSIS — E11.9 TYPE 2 DIABETES MELLITUS WITHOUT COMPLICATION (H): ICD-10-CM

## 2022-06-05 RX ORDER — METFORMIN HCL 500 MG
TABLET, EXTENDED RELEASE 24 HR ORAL
Qty: 360 TABLET | Refills: 3 | Status: SHIPPED | OUTPATIENT
Start: 2022-06-05 | End: 2023-06-22

## 2022-06-05 NOTE — TELEPHONE ENCOUNTER
"Last Written Prescription Date:  3/9/22  Last Fill Quantity: 360,  # refills: 0   Last office visit provider:  4/25/22     Requested Prescriptions   Pending Prescriptions Disp Refills     metFORMIN (GLUCOPHAGE XR) 500 MG 24 hr tablet [Pharmacy Med Name: METFORMIN HCL  MG TABLET] 360 tablet 0     Sig: TAKE 2 TABLETS BY MOUTH TWICE A DAY WITH FOOD       Biguanide Agents Passed - 6/4/2022  4:53 PM        Passed - Patient is age 10 or older        Passed - Patient has documented A1c within the specified period of time.     If HgbA1C is 8 or greater, it needs to be on file within the past 3 months.  If less than 8, must be on file within the past 6 months.     Recent Labs   Lab Test 04/25/22  1424   A1C 8.1*             Passed - Patient's CR is NOT>1.4 OR Patient's EGFR is NOT<45 within past 12 mos.     Recent Labs   Lab Test 04/25/22  1424 09/20/21  1102 06/21/21  1024   GFRESTIMATED 52*   < > >60   GFRESTBLACK  --   --  >60    < > = values in this interval not displayed.       Recent Labs   Lab Test 04/25/22  1424   CR 1.42*             Passed - Patient does NOT have a diagnosis of CHF.        Passed - Medication is active on med list        Passed - Recent (6 mo) or future (30 days) visit within the authorizing provider's specialty     Patient had office visit in the last 6 months or has a visit in the next 30 days with authorizing provider or within the authorizing provider's specialty.  See \"Patient Info\" tab in inbasket, or \"Choose Columns\" in Meds & Orders section of the refill encounter.                 Jeni Browning RN 06/05/22 6:47 PM  "

## 2022-07-05 DIAGNOSIS — E11.29 TYPE 2 DIABETES MELLITUS WITH MICROALBUMINURIA, WITHOUT LONG-TERM CURRENT USE OF INSULIN (H): Primary | ICD-10-CM

## 2022-07-05 DIAGNOSIS — R80.9 TYPE 2 DIABETES MELLITUS WITH MICROALBUMINURIA, WITHOUT LONG-TERM CURRENT USE OF INSULIN (H): Primary | ICD-10-CM

## 2022-08-10 DIAGNOSIS — I10 ESSENTIAL HYPERTENSION: ICD-10-CM

## 2022-08-10 RX ORDER — METOPROLOL SUCCINATE 50 MG/1
50 TABLET, EXTENDED RELEASE ORAL DAILY
Qty: 90 TABLET | Refills: 2 | Status: ON HOLD | OUTPATIENT
Start: 2022-08-10 | End: 2023-05-10

## 2022-08-10 RX ORDER — LISINOPRIL 40 MG/1
40 TABLET ORAL DAILY
Qty: 90 TABLET | Refills: 1 | Status: CANCELLED | OUTPATIENT
Start: 2022-08-10

## 2022-08-10 NOTE — TELEPHONE ENCOUNTER
Reason for Call:  Medication or medication refill:    Do you use a [facility]?  Name of the pharmacy and phone number for the current request:  [facility] on [address] in [place]    Name of the medication requested:   lisinopril (ZESTRIL) 40 MG tablet  metoprolol succinate ER (TOPROL-XL) 50 MG 24 hr tablet    Other request: Pt son Maite (C2C on file) called and would like a refill on these. Pt is almost done, please send RX to pharmacy on file. Thank you!    Can we leave a detailed message on this number? YES    Phone number patient can be reached at: Home number on file [phone] (home)    Best Time: any    Call taken on 8/10/2022 at 8:47 AM by Mary Oshea

## 2022-08-11 DIAGNOSIS — I10 ESSENTIAL HYPERTENSION: ICD-10-CM

## 2022-08-11 RX ORDER — LISINOPRIL 40 MG/1
40 TABLET ORAL DAILY
Qty: 90 TABLET | Refills: 2 | Status: CANCELLED | OUTPATIENT
Start: 2022-08-11

## 2022-08-11 NOTE — TELEPHONE ENCOUNTER
"Last Written Prescription Date:  8/3/21  Last Fill Quantity: 90,  # refills: 3   Last office visit provider:  4/25/22     Requested Prescriptions   Pending Prescriptions Disp Refills     metoprolol succinate ER (TOPROL XL) 50 MG 24 hr tablet 90 tablet 3     Sig: Take 1 tablet (50 mg) by mouth daily       Beta-Blockers Protocol Passed - 8/10/2022  8:49 AM        Passed - Blood pressure under 140/90 in past 12 months     BP Readings from Last 3 Encounters:   04/25/22 128/88   09/20/21 138/80   06/21/21 120/71                 Passed - Patient is age 6 or older        Passed - Recent (12 mo) or future (30 days) visit within the authorizing provider's specialty     Patient has had an office visit with the authorizing provider or a provider within the authorizing providers department within the previous 12 mos or has a future within next 30 days. See \"Patient Info\" tab in inbasket, or \"Choose Columns\" in Meds & Orders section of the refill encounter.              Passed - Medication is active on med list             Jeni Browning RN 08/10/22 7:58 PM  "

## 2022-08-11 NOTE — TELEPHONE ENCOUNTER
"Routing refill request to provider for review/approval because:  Labs out of range:  Creatinine    Last Written Prescription Date:  1/24/22  Last Fill Quantity: 90,  # refills: 1   Last office visit provider:  4/25/22     Requested Prescriptions   Pending Prescriptions Disp Refills     lisinopril (ZESTRIL) 40 MG tablet 90 tablet 1     Sig: Take 1 tablet (40 mg) by mouth daily       ACE Inhibitors (Including Combos) Protocol Failed - 8/11/2022 12:41 PM        Failed - Normal serum creatinine on file in past 12 months     Recent Labs   Lab Test 04/25/22  1424   CR 1.42*       Ok to refill medication if creatinine is low          Passed - Blood pressure under 140/90 in past 12 months     BP Readings from Last 3 Encounters:   04/25/22 128/88   09/20/21 138/80   06/21/21 120/71                 Passed - Recent (12 mo) or future (30 days) visit within the authorizing provider's specialty     Patient has had an office visit with the authorizing provider or a provider within the authorizing providers department within the previous 12 mos or has a future within next 30 days. See \"Patient Info\" tab in inbasket, or \"Choose Columns\" in Meds & Orders section of the refill encounter.              Passed - Medication is active on med list        Passed - Patient is age 18 or older        Passed - Normal serum potassium on file in past 12 months     Recent Labs   Lab Test 04/25/22  1424   POTASSIUM 4.0                  aDrnell Camarillo RN 08/11/22 12:41 PM  "

## 2022-08-12 RX ORDER — LISINOPRIL 40 MG/1
40 TABLET ORAL DAILY
Qty: 90 TABLET | Refills: 2 | Status: ON HOLD | OUTPATIENT
Start: 2022-08-12 | End: 2023-05-10

## 2022-08-12 RX ORDER — METOPROLOL SUCCINATE 50 MG/1
50 TABLET, EXTENDED RELEASE ORAL DAILY
Qty: 90 TABLET | Refills: 2 | OUTPATIENT
Start: 2022-08-12

## 2022-08-12 NOTE — TELEPHONE ENCOUNTER
"Routing refill request to provider for review/approval because:  Labs out of range:  Creatinine    Last Written Prescription Date:  1/24/22  Last Fill Quantity: 90,  # refills: 1   Last office visit provider:  4/25/22     Requested Prescriptions   Pending Prescriptions Disp Refills     lisinopril (ZESTRIL) 40 MG tablet 90 tablet 1     Sig: Take 1 tablet (40 mg) by mouth daily       ACE Inhibitors (Including Combos) Protocol Failed - 8/12/2022 10:06 AM        Failed - Normal serum creatinine on file in past 12 months     Recent Labs   Lab Test 04/25/22  1424   CR 1.42*       Ok to refill medication if creatinine is low          Passed - Blood pressure under 140/90 in past 12 months     BP Readings from Last 3 Encounters:   04/25/22 128/88   09/20/21 138/80   06/21/21 120/71                 Passed - Recent (12 mo) or future (30 days) visit within the authorizing provider's specialty     Patient has had an office visit with the authorizing provider or a provider within the authorizing providers department within the previous 12 mos or has a future within next 30 days. See \"Patient Info\" tab in inbasket, or \"Choose Columns\" in Meds & Orders section of the refill encounter.              Passed - Medication is active on med list        Passed - Patient is age 18 or older        Passed - Normal serum potassium on file in past 12 months     Recent Labs   Lab Test 04/25/22  1424   POTASSIUM 4.0              Refused Prescriptions Disp Refills     metoprolol succinate ER (TOPROL XL) 50 MG 24 hr tablet 90 tablet 2     Sig: Take 1 tablet (50 mg) by mouth daily       Beta-Blockers Protocol Passed - 8/12/2022 10:07 AM        Passed - Blood pressure under 140/90 in past 12 months     BP Readings from Last 3 Encounters:   04/25/22 128/88   09/20/21 138/80   06/21/21 120/71                 Passed - Patient is age 6 or older        Passed - Recent (12 mo) or future (30 days) visit within the authorizing provider's specialty     " "Patient has had an office visit with the authorizing provider or a provider within the authorizing providers department within the previous 12 mos or has a future within next 30 days. See \"Patient Info\" tab in inbasket, or \"Choose Columns\" in Meds & Orders section of the refill encounter.              Passed - Medication is active on med list             Darnell Camarillo RN 08/12/22 10:07 AM  "

## 2022-08-23 ENCOUNTER — MEDICAL CORRESPONDENCE (OUTPATIENT)
Dept: HEALTH INFORMATION MANAGEMENT | Facility: CLINIC | Age: 77
End: 2022-08-23

## 2022-08-30 ENCOUNTER — OFFICE VISIT (OUTPATIENT)
Dept: FAMILY MEDICINE | Facility: CLINIC | Age: 77
End: 2022-08-30
Payer: MEDICARE

## 2022-08-30 VITALS
HEIGHT: 65 IN | SYSTOLIC BLOOD PRESSURE: 129 MMHG | HEART RATE: 76 BPM | RESPIRATION RATE: 16 BRPM | WEIGHT: 175 LBS | DIASTOLIC BLOOD PRESSURE: 82 MMHG | BODY MASS INDEX: 29.16 KG/M2 | TEMPERATURE: 98 F

## 2022-08-30 DIAGNOSIS — E11.29 TYPE 2 DIABETES MELLITUS WITH MICROALBUMINURIA, WITHOUT LONG-TERM CURRENT USE OF INSULIN (H): Primary | ICD-10-CM

## 2022-08-30 DIAGNOSIS — Z13.220 SCREENING FOR HYPERLIPIDEMIA: ICD-10-CM

## 2022-08-30 DIAGNOSIS — N18.31 CHRONIC KIDNEY DISEASE, STAGE 3A (H): ICD-10-CM

## 2022-08-30 DIAGNOSIS — R80.9 TYPE 2 DIABETES MELLITUS WITH MICROALBUMINURIA, WITHOUT LONG-TERM CURRENT USE OF INSULIN (H): Primary | ICD-10-CM

## 2022-08-30 DIAGNOSIS — I10 ESSENTIAL HYPERTENSION: ICD-10-CM

## 2022-08-30 LAB
ALBUMIN UR-MCNC: NEGATIVE MG/DL
ALT SERPL W P-5'-P-CCNC: 26 U/L (ref 10–50)
ANION GAP SERPL CALCULATED.3IONS-SCNC: 13 MMOL/L (ref 7–15)
APPEARANCE UR: CLEAR
BACTERIA #/AREA URNS HPF: ABNORMAL /HPF
BILIRUB UR QL STRIP: NEGATIVE
BUN SERPL-MCNC: 17.5 MG/DL (ref 8–23)
CALCIUM SERPL-MCNC: 10.6 MG/DL (ref 8.8–10.2)
CHLORIDE SERPL-SCNC: 95 MMOL/L (ref 98–107)
CHOLEST SERPL-MCNC: 118 MG/DL
COLOR UR AUTO: YELLOW
CREAT SERPL-MCNC: 1.34 MG/DL (ref 0.67–1.17)
DEPRECATED HCO3 PLAS-SCNC: 29 MMOL/L (ref 22–29)
GFR SERPL CREATININE-BSD FRML MDRD: 56 ML/MIN/1.73M2
GLUCOSE SERPL-MCNC: 167 MG/DL (ref 70–99)
GLUCOSE UR STRIP-MCNC: NEGATIVE MG/DL
HBA1C MFR BLD: 8.7 % (ref 0–5.6)
HDLC SERPL-MCNC: 38 MG/DL
HGB BLD-MCNC: 14.6 G/DL (ref 13.3–17.7)
HGB UR QL STRIP: NEGATIVE
KETONES UR STRIP-MCNC: ABNORMAL MG/DL
LDLC SERPL CALC-MCNC: 50 MG/DL
LEUKOCYTE ESTERASE UR QL STRIP: NEGATIVE
NITRATE UR QL: NEGATIVE
NONHDLC SERPL-MCNC: 80 MG/DL
PH UR STRIP: 6.5 [PH] (ref 5–8)
POTASSIUM SERPL-SCNC: 4.5 MMOL/L (ref 3.4–5.3)
RBC #/AREA URNS AUTO: ABNORMAL /HPF
SODIUM SERPL-SCNC: 137 MMOL/L (ref 136–145)
SP GR UR STRIP: 1.02 (ref 1–1.03)
SQUAMOUS #/AREA URNS AUTO: ABNORMAL /LPF
TRIGL SERPL-MCNC: 152 MG/DL
UROBILINOGEN UR STRIP-ACNC: 0.2 E.U./DL
WBC #/AREA URNS AUTO: ABNORMAL /HPF

## 2022-08-30 PROCEDURE — 80048 BASIC METABOLIC PNL TOTAL CA: CPT | Performed by: FAMILY MEDICINE

## 2022-08-30 PROCEDURE — 85018 HEMOGLOBIN: CPT | Performed by: FAMILY MEDICINE

## 2022-08-30 PROCEDURE — 80061 LIPID PANEL: CPT | Performed by: FAMILY MEDICINE

## 2022-08-30 PROCEDURE — 99214 OFFICE O/P EST MOD 30 MIN: CPT | Performed by: FAMILY MEDICINE

## 2022-08-30 PROCEDURE — 99207 PR FOOT EXAM NO CHARGE: CPT | Performed by: FAMILY MEDICINE

## 2022-08-30 PROCEDURE — 84460 ALANINE AMINO (ALT) (SGPT): CPT | Performed by: FAMILY MEDICINE

## 2022-08-30 PROCEDURE — 83036 HEMOGLOBIN GLYCOSYLATED A1C: CPT | Performed by: FAMILY MEDICINE

## 2022-08-30 PROCEDURE — 36415 COLL VENOUS BLD VENIPUNCTURE: CPT | Performed by: FAMILY MEDICINE

## 2022-08-30 PROCEDURE — 81001 URINALYSIS AUTO W/SCOPE: CPT | Performed by: FAMILY MEDICINE

## 2022-08-30 NOTE — PROGRESS NOTES
"  Assessment & Plan     Type 2 diabetes mellitus with microalbuminuria, without long-term current use of insulin (H)  Not controlled.  For kidney protection and better blood sugar control to start SGLT2.  Recheck 6 weeks.  - FOOT EXAM  - Hemoglobin A1c  - canagliflozin (INVOKANA) 100 MG tablet  Dispense: 90 tablet; Refill: 0    Chronic kidney disease, stage 3a (H)  SGLT2 with renal benefit..  Avoid nephrotoxic medication.  - Hemoglobin  - Basic metabolic panel  (Ca, Cl, CO2, Creat, Gluc, K, Na, BUN)  - UA with Microscopic - lab collect  - Urine Microscopic Exam  - canagliflozin (INVOKANA) 100 MG tablet  Dispense: 90 tablet; Refill: 0    Essential hypertension  Controlled.  Continue lisinopril, hydrochlorothiazide, metoprolol    Screening for hyperlipidemia  Continue high potency statin with atorvastatin 40.  - Lipid panel reflex to direct LDL Fasting  - ALT    BMI:   Estimated body mass index is 29.51 kg/m  as calculated from the following:    Height as of this encounter: 1.64 m (5' 4.57\").    Weight as of this encounter: 79.4 kg (175 lb).       Return in about 6 weeks (around 10/11/2022) for Follow up.    Edison Banda MD  [facility]    Moris Kingston is a 76 year old accompanied by his self, presenting for the following health issues:  Recheck Medication and Establish Care      History of Present Illness       Reason for visit:  Recheck medication    He eats 2-3 servings of fruits and vegetables daily.He consumes 0 sweetened beverage(s) daily.He exercises with enough effort to increase his heart rate 30 to 60 minutes per day.  He exercises with enough effort to increase his heart rate 3 or less days per week.   He is taking medications regularly.       Diabetes Follow-up    How often are you checking your blood sugar? One time daily  What time of day are you checking your blood sugars (select all that apply)?  After meals  Have you had any blood sugars above 200?  No  Have you had " "any blood sugars below 70?  No    What symptoms do you notice when your blood sugar is low?  None and Not applicable    What concerns do you have today about your diabetes? None     Do you have any of these symptoms? (Select all that apply)  Weight loss    Have you had a diabetic eye exam in the last 12 months? Yes- Date of last eye exam: Nov 2021,  Location: [facility] ([facility] signed)        Hyperlipidemia Follow-Up      Are you regularly taking any medication or supplement to lower your cholesterol?   Yes- atorvastatin    Are you having muscle aches or other side effects that you think could be caused by your cholesterol lowering medication?  No    Hypertension Follow-up      Do you check your blood pressure regularly outside of the clinic? Yes     Are you following a low salt diet? Yes    Are your blood pressures ever more than 140 on the top number (systolic) OR more   than 90 on the bottom number (diastolic), for example 140/90? No    BP Readings from Last 2 Encounters:   08/30/22 129/82   04/25/22 128/88     Hemoglobin A1C (%)   Date Value   08/30/2022 8.7 (H)   04/25/2022 8.1 (H)     LDL Cholesterol Calculated (mg/dL)   Date Value   08/30/2022 50   09/20/2021 56           Objective    /82 (BP Location: Right arm, Patient Position: Sitting, Cuff Size: Adult Regular)   Pulse 76   Temp 98  F (36.7  C) (Temporal)   Resp 16   Ht 1.64 m (5' 4.57\")   Wt 79.4 kg (175 lb)   BMI 29.51 kg/m    Body mass index is 29.51 kg/m .  Physical Exam   GENERAL: alert, not distressed  CHEST: clear, no rales, rhonchi, or wheezes  CARDIAC: regular without murmur, gallop, or rub  ABDOMEN: soft, non tender, non distended, normal bowel sounds     FOOT EXAM:  DP and PT pulses are normal.  Monofilament testing normal  Nails normal.  Hair growth absent.  No skin changes.     Results for orders placed or performed in visit on 08/30/22   Lipid panel reflex to direct LDL Fasting     Status: Abnormal   Result Value Ref Range    " Cholesterol 118 <200 mg/dL    Triglycerides 152 (H) <150 mg/dL    Direct Measure HDL 38 (L) >=40 mg/dL    LDL Cholesterol Calculated 50 <=100 mg/dL    Non HDL Cholesterol 80 <130 mg/dL    Narrative    Cholesterol  Desirable:  <200 mg/dL    Triglycerides  Normal:  Less than 150 mg/dL  Borderline High:  150-199 mg/dL  High:  200-499 mg/dL  Very High:  Greater than or equal to 500 mg/dL    Direct Measure HDL  Female:  Greater than or equal to 50 mg/dL   Male:  Greater than or equal to 40 mg/dL    LDL Cholesterol  Desirable:  <100mg/dL  Above Desirable:  100-129 mg/dL   Borderline High:  130-159 mg/dL   High:  160-189 mg/dL   Very High:  >= 190 mg/dL    Non HDL Cholesterol  Desirable:  130 mg/dL  Above Desirable:  130-159 mg/dL  Borderline High:  160-189 mg/dL  High:  190-219 mg/dL  Very High:  Greater than or equal to 220 mg/dL   Hemoglobin A1c     Status: Abnormal   Result Value Ref Range    Hemoglobin A1C 8.7 (H) 0.0 - 5.6 %   Hemoglobin     Status: Normal   Result Value Ref Range    Hemoglobin 14.6 13.3 - 17.7 g/dL   Basic metabolic panel  (Ca, Cl, CO2, Creat, Gluc, K, Na, BUN)     Status: Abnormal   Result Value Ref Range    Creatinine 1.34 (H) 0.67 - 1.17 mg/dL    Sodium 137 136 - 145 mmol/L    Potassium 4.5 3.4 - 5.3 mmol/L    Urea Nitrogen 17.5 8.0 - 23.0 mg/dL    Chloride 95 (L) 98 - 107 mmol/L    Carbon Dioxide (CO2) 29 22 - 29 mmol/L    Anion Gap 13 7 - 15 mmol/L    Glucose 167 (H) 70 - 99 mg/dL    GFR Estimate 56 (L) >60 mL/min/1.73m2    Calcium 10.6 (H) 8.8 - 10.2 mg/dL   ALT     Status: Normal   Result Value Ref Range    ALT 26 10 - 50 U/L   UA with Microscopic - lab collect     Status: Abnormal   Result Value Ref Range    Color Urine Yellow Colorless, Straw, Light Yellow, Yellow    Appearance Urine Clear Clear    Glucose Urine Negative Negative mg/dL    Bilirubin Urine Negative Negative    Ketones Urine Trace (A) Negative mg/dL    Specific Gravity Urine 1.020 1.005 - 1.030    Blood Urine Negative Negative     pH Urine 6.5 5.0 - 8.0    Protein Albumin Urine Negative Negative mg/dL    Urobilinogen Urine 0.2 0.2, 1.0 E.U./dL    Nitrite Urine Negative Negative    Leukocyte Esterase Urine Negative Negative   Urine Microscopic Exam     Status: Abnormal   Result Value Ref Range    Bacteria Urine Few (A) None Seen /HPF    RBC Urine None Seen 0-2 /HPF /HPF    WBC Urine 0-5 0-5 /HPF /HPF    Squamous Epithelials Urine Few (A) None Seen /LPF               .  ..

## 2022-09-01 ENCOUNTER — TELEPHONE (OUTPATIENT)
Dept: FAMILY MEDICINE | Facility: CLINIC | Age: 77
End: 2022-09-01

## 2022-09-01 NOTE — TELEPHONE ENCOUNTER
RN called pt with the help of a keri  regarding Dr. Banda's message below.  Provider message relayed to patient. Per pt, he already picked up the new RX, Invokana. Pt will take it as prescribed.    Pt verbalizes understanding, agrees with plan and has no further questions.    Closing encounter.    FLAVIO Nguyen, RN   [facility]    ----- Message -----  From: Edison Banda MD  Sent: 8/31/2022   8:02 AM CDT  To: [facility] [place]    Call:  A1c is increasing (over 8) so blood sugars are not controlled.  We should add a medicine (Invokana) that will help to lower blood sugars and protect kidneys.  I sent prescription to your pharmacy.  I recommend we recheck the labs after 6 weeks of taking it.

## 2022-09-13 ENCOUNTER — ALLIED HEALTH/NURSE VISIT (OUTPATIENT)
Dept: EDUCATION SERVICES | Facility: CLINIC | Age: 77
End: 2022-09-13
Payer: MEDICARE

## 2022-09-13 VITALS — WEIGHT: 190 LBS | BODY MASS INDEX: 32.04 KG/M2

## 2022-09-13 PROCEDURE — G0108 DIAB MANAGE TRN  PER INDIV: HCPCS | Performed by: DIETITIAN, REGISTERED

## 2022-09-13 NOTE — PATIENT INSTRUCTIONS
Immediately present for medical attention for any sudden onset shortness of breath, chest pain, redness, warmth, or swelling of any extremity, unusual bruising, or bleeding that indicates medical attention.  Maintain a consistent amount of vitamin K foods (\"greens\") week to week.  Follow your anticoagulation dosing instructions, and take Warfarin as discussed during your visit.          Test blood sugar twice daily: in the morning before eating and two hours after one of your meals.   Increase water to 5-6 cups per day.  Continue to walk daily for 60 minutes: outside or use your treadmill.   Schedule an eye exam when it is affordable for you.   5. Follow up with Diabetes educator on 9/28/22.

## 2022-09-13 NOTE — PROGRESS NOTES
Diabetes Self-Management Education & Support/ 53 minutes    Presents for: Individual review    CDE VISIT MODE: In Person    Assessment Type:   ASSESSMENT:  Diabetes visit through professional  # [id number].  Vashti reported Diabetes diagnosis was 10-20 years ago. Vashti did not bring his meter or medications to today's visit. He reported checking his FBS 3-4x/week.   He reports of no s/s of hyperglycemia except for frequent urination. He is walking daily for 60 minutes, and has a treadmill to use over the winter months.  He is consuming two meals/day, he is limiting his rice portions, he does not drink sugary beverages.  Water intake is low at 1-2 cups/day. No tobacco use, ETOH intake is infrequent.     Patient's most recent   Lab Results   Component Value Date    A1C 8.7 08/30/2022     is not meeting goal of <7.0    Diabetes knowledge and skills assessment:   Patient is knowledgeable in diabetes management concepts related to: Healthy Eating, Being Active, Taking Medication and Reducing Risks    Continue education with the following diabetes management concepts: Monitoring    Based on learning assessment above, most appropriate setting for further diabetes education would be: Individual setting.      PLAN  1. Test blood sugar twice daily: in the morning before eating and two hours after one of your meals.   2. Increase water to 5-6 cups per day.  3. Continue to walk daily for 60 minutes: outside or use your treadmill.   4. Schedule an eye exam when it is affordable for you.   5. Follow up with Diabetes educator on 9/28/22.     Topics to cover at upcoming visits: Monitoring    Follow-up: 9/28/22    See Care Plan for co-developed, patient-state behavior change goals.  AVS provided for patient today.    Education Materials Provided:  Hmong food guide      SUBJECTIVE/OBJECTIVE:  Presents for: Individual review  Accompanied by: Self  Diabetes education in the past 24mo: No  Focus of Visit: Monitoring, Reducing  "Risks, Taking Medication, Healthy Eating, Being Active  Diabetes type: Type 2  Date of diagnosis: patient reports Diagnosis about 10-20 years  Disease course: Worsening  Other concerns:: Language barrier  Cultural Influences/Ethnic Background:  Not  or       Diabetes Symptoms & Complications:  Fatigue: Sometimes (when I want to go walking, feels lightheaded)  Neuropathy: No  Polydipsia: No  Polyphagia: No  Polyuria: Yes  Visual change: No  Slow healing wounds: No  Weight trend:  (scale reflects a 15# weight gain since 8/30, patient has not noticed any changes in how his clothes fit)       Patient Problem List and Family Medical History reviewed for relevant medical history, current medical status, and diabetes risk factors.    Vitals:  Wt 86.2 kg (190 lb)   BMI 32.04 kg/m    Estimated body mass index is 32.04 kg/m  as calculated from the following:    Height as of 8/30/22: 1.64 m (5' 4.57\").    Weight as of this encounter: 86.2 kg (190 lb).   Last 3 BP:   BP Readings from Last 3 Encounters:   08/30/22 129/82   04/25/22 128/88   09/20/21 138/80       History   Smoking Status     Never Smoker   Smokeless Tobacco     Never Used       Labs:  Lab Results   Component Value Date    A1C 8.7 08/30/2022     Lab Results   Component Value Date     08/30/2022     04/25/2022     Lab Results   Component Value Date    LDL 50 08/30/2022     Direct Measure HDL   Date Value Ref Range Status   08/30/2022 38 (L) >=40 mg/dL Final   ]  GFR Estimate   Date Value Ref Range Status   08/30/2022 56 (L) >60 mL/min/1.73m2 Final     Comment:     Effective December 21, 2021 eGFRcr in adults is calculated using the 2021 CKD-EPI creatinine equation which includes age and gender (Reji ortega al., NEJM, DOI: 10.1056/EARYvh0086722)   06/21/2021 >60 >60 mL/min/1.73m2 Final     GFR Estimate If Black   Date Value Ref Range Status   06/21/2021 >60 >60 mL/min/1.73m2 Final     Lab Results   Component Value Date    CR 1.34 08/30/2022 "     No results found for: MICROALBUMIN    Healthy Eating:  Healthy Eating Assessed Today: Yes  Meal planning/habits: None  Meals include: Breakfast, Dinner  Breakfast: 9-10am: protein-fish mostly, sometimes chicken/pork, rice, one cup(white, silvia), vegetables, tea, unsweetened  Dinner: 6-7pm: protein-fish mostly, sometimes chicken/pork, rice, one cup(white, silvia), vegetables, bottled water  Snacks: banana or plum  Beverages: Water, Tea (once in a long time he will have something sweet)  Has patient met with a dietitian in the past?:  (unsure)    Being Active:  Being Active Assessed Today: Yes (walk every morning, sometimes two times per day: about 60 minutes)  Exercise:: Yes (patient uses his treadmill for exercise when the weather is cold)  Days per week of moderate to strenuous exercise (like a brisk walk): 6  On average, minutes per day of exercise at this level: 60  Exercise Minutes per Week: 360    Monitoring:  Monitoring Assessed Today: Yes  Did patient bring glucose meter to appointment? : No  Blood Glucose Meter: Accu-chek  Times checking blood sugar at home (number): 3  Times checking blood sugar at home (per): Week        Taking Medications:  Diabetes Medication(s)     Biguanides       metFORMIN (GLUCOPHAGE XR) 500 MG 24 hr tablet    TAKE 2 TABLETS BY MOUTH TWICE A DAY WITH FOOD    Sodium-Glucose Co-Transporter 2 (SGLT2) Inhibitors       canagliflozin (INVOKANA) 100 MG tablet    Take 1 tablet (100 mg) by mouth every morning (before breakfast)    Sulfonylureas       glipiZIDE (GLUCOTROL) 5 MG tablet    [GLIPIZIDE (GLUCOTROL) 5 MG TABLET] TAKE 2 TABLETS BY MOUTH TWICE A DAY WITH MEALS          Taking Medication Assessed Today: Yes  Current Treatments: Diet, Oral Medication (taken by mouth)  Problems taking diabetes medications regularly?: No (patient states he may forget once in awhile, but not often)    Problem Solving:                 Reducing Risks:  Diabetes Risks: Age over 45 years, Ethnicity  Has  dilated eye exam at least once a year?: No (last check was 2-3 years ago he has to pay for his eye exams so it is not affordable.)  Sees dentist every 6 months?: No (no, not covered under Medicare, so not affordable.)    Healthy Coping:  Emotional response to diabetes: Ready to learn, Concern for health and well-being  Informal Support system:: Family  Stage of change: ACTION (Actively working towards change)  Patient Activation Measure Survey Score:  No flowsheet data found.      Care Plan and Education Provided:  Care Plan: Diabetes   Updates made by Tala Reza RD since 9/13/2022 12:00 AM      Problem: HbA1C Not In Goal       Goal: Establish Regular Follow-Ups with PCP       Task: Discuss with PCP the recommended timing for patient's next follow up visit(s)    Responsible User: Tala Reza RD      Task: Discuss schedule for PCP visits with patient    Responsible User: Tala Reza RD      Goal: Get HbA1C Level in Goal       Task: Educate patient on diabetes education self-management topics    Responsible User: Tala Reza RD      Task: Educate patient on benefits of regular glucose monitoring    Responsible User: Tala Reza RD      Task: Refer patient to appropriate extended care team member, as needed (Medication Therapy Management, Behavioral Health, Physical Therapy, etc.)    Responsible User: Tala Reza RD      Task: Discuss diabetes treatment plan with patient    Responsible User: Tala Reza RD      Problem: Diabetes Self-Management Education Needed to Optimize Self-Care Behaviors       Goal: Understand diabetes pathophysiology and disease progression       Task: Provide education on diabetes pathophysiology and disease progression specfic to patient's diabetes type    Responsible User: Tala Reza RD      Goal: Healthy Eating - follow a healthy eating pattern for diabetes       Task: Provide education on portion control and consistency in amount, composition and timing of  food intake Completed 9/13/2022   Responsible User: Tala Reza RD      Task: Provide education on managing carbohydrate intake (carbohydrate counting, plate planning method, etc.)    Responsible User: Tala Reza RD      Task: Provide education on weight management    Responsible User: Tala Reza RD      Task: Provide education on heart healthy eating    Responsible User: Tala Reza RD      Task: Provide education on eating out    Responsible User: Tala Reza RD      Task: Develop individualized healthy eating plan with patient    Responsible User: Tala Reza RD      Goal: Being Active - get regular physical activity, working up to at least 150 minutes per week       Task: Provide education on relationship of activity to glucose and precautions to take if at risk for low glucose    Responsible User: Tala Reza RD      Task: Discuss barriers to physical activity with patient    Responsible User: Tala Reza RD      Task: Develop physical activity plan with patient    Responsible User: Tala Reza RD      Task: Explore community resources including walking groups, assistance programs, and home videos    Responsible User: Tala Reza RD      Goal: Monitoring - monitor glucose and ketones as directed    This Visit's Progress: 20%   Note:    Check blood sugar twice daily: fasting and two hours after a meal.      Task: Provide education on blood glucose monitoring (purpose, proper technique, frequency, glucose targets, interpreting results, when to use glucose control solution, sharps disposal)    Responsible User: Tala Reza RD      Task: Provide education on continuous glucose monitoring (sensor placement, use of gabriel or /reader, understanding glucose trends, alerts and alarms, differences between sensor glucose and blood glucose)    Responsible User: Tala Reza RD      Task: Provide education on ketone monitoring (when to monitor, frequency, etc.)     Responsible User: Tala Reza RD      Goal: Taking Medication - patient is consistently taking medications as directed       Task: Provide education on action of prescribed medication, including when to take and possible side effects    Responsible User: Tala Reza RD      Task: Provide education on insulin and injectable diabetes medications, including administration, storage, site selection and rotation for injection sites    Responsible User: Tala Reza RD      Task: Discuss barriers to medication adherence with patient and provide management technique ideas as appropriate    Responsible User: Tala Reza RD      Task: Provide education on frequency and refill details of medications    Responsible User: Tala Reza RD      Goal: Problem Solving - know how to prevent and manage short-term diabetes complications       Task: Provide education on high blood glucose - causes, signs/symptoms, prevention and treatment    Responsible User: Tala Reza RD      Task: Provide education on low blood glucose - causes, signs/symptoms, prevention, treatment, carrying a carbohydrate source at all times, and medical identification    Responsible User: Tala Reza RD      Task: Provide education on safe travel with diabetes    Responsible User: Tala Reza RD      Task: Provide education on how to care for diabetes on sick days    Responsible User: Tala Reza RD      Task: Provide education on when to call a health care provider    Responsible User: Tala Reza RD      Goal: Reducing Risks - know how to prevent and treat long-term diabetes complications       Task: Provide education on major complications of diabetes, prevention, early diagnostic measures and treatment of complications    Responsible User: Tala Reza RD      Task: Provide education on recommended care for dental, eye and foot health    Responsible User: Tala Reza RD      Task: Provide education on Hemoglobin  A1c - goals and relationship to blood glucose levels    Responsible User: Tala Reza RD      Task: Provide education on recommendations for heart health - lipid levels and goals, blood pressure and goals, and aspirin therapy, if indicated    Responsible User: Tala Reza RD      Task: Provide education on tobacco cessation    Responsible User: Tala Reza RD      Goal: Healthy Coping - use available resources to cope with the challenges of managing diabetes       Task: Discuss recognizing feelings about having diabetes    Responsible User: Tala Reza RD      Task: Provide education on the benefits of making appropriate lifestyle changes    Responsible User: Tala Reza RD      Task: Provide education on benefits of utilizing support systems    Responsible User: Tala Reza RD      Task: Discuss methods for coping with stress    Responsible User: Tala Reza RD      Task: Provide education on when to seek professional counseling    Responsible User: Tala Reza RD              Time Spent: 60 minutes  Encounter Type: Individual    Any diabetes medication dose changes were made via the CDE Protocol per the patient's primary care provider. A copy of this encounter was shared with the provider.

## 2022-09-13 NOTE — LETTER
9/13/2022         RE: Vashti King  [address] [place] [address]        Dear Colleague,    Thank you for referring your patient, Vashti King, to the [facility]. Please see a copy of my visit note below.    Diabetes Self-Management Education & Support/ 53 minutes    Presents for: Individual review    CDE VISIT MODE: In Person    Assessment Type:   ASSESSMENT:  Diabetes visit through professional  # [id number].  Vashti reported Diabetes diagnosis was 10-20 years ago. Vashti did not bring his meter or medications to today's visit. He reported checking his FBS 3-4x/week.   He reports of no s/s of hyperglycemia except for frequent urination. He is walking daily for 60 minutes, and has a treadmill to use over the winter months.  He is consuming two meals/day, he is limiting his rice portions, he does not drink sugary beverages.  Water intake is low at 1-2 cups/day. No tobacco use, ETOH intake is infrequent.     Patient's most recent   Lab Results   Component Value Date    A1C 8.7 08/30/2022     is not meeting goal of <7.0    Diabetes knowledge and skills assessment:   Patient is knowledgeable in diabetes management concepts related to: Healthy Eating, Being Active, Taking Medication and Reducing Risks    Continue education with the following diabetes management concepts: Monitoring    Based on learning assessment above, most appropriate setting for further diabetes education would be: Individual setting.      PLAN  1. Test blood sugar twice daily: in the morning before eating and two hours after one of your meals.   2. Increase water to 5-6 cups per day.  3. Continue to walk daily for 60 minutes: outside or use your treadmill.   4. Schedule an eye exam when it is affordable for you.   5. Follow up with Diabetes educator on 9/28/22.     Topics to cover at upcoming visits: Monitoring    Follow-up: 9/28/22    See Care Plan for co-developed, patient-state behavior change  "goals.  AVS provided for patient today.    Education Materials Provided:  [employer] food guide      SUBJECTIVE/OBJECTIVE:  Presents for: Individual review  Accompanied by: Self  Diabetes education in the past 24mo: No  Focus of Visit: Monitoring, Reducing Risks, Taking Medication, Healthy Eating, Being Active  Diabetes type: Type 2  Date of diagnosis: patient reports Diagnosis about 10-20 years  Disease course: Worsening  Other concerns:: Language barrier  Cultural Influences/Ethnic Background:  Not  or       Diabetes Symptoms & Complications:  Fatigue: Sometimes (when I want to go walking, feels lightheaded)  Neuropathy: No  Polydipsia: No  Polyphagia: No  Polyuria: Yes  Visual change: No  Slow healing wounds: No  Weight trend:  (scale reflects a 15# weight gain since 8/30, patient has not noticed any changes in how his clothes fit)       Patient Problem List and Family Medical History reviewed for relevant medical history, current medical status, and diabetes risk factors.    Vitals:  Wt 86.2 kg (190 lb)   BMI 32.04 kg/m    Estimated body mass index is 32.04 kg/m  as calculated from the following:    Height as of 8/30/22: 1.64 m (5' 4.57\").    Weight as of this encounter: 86.2 kg (190 lb).   Last 3 BP:   BP Readings from Last 3 Encounters:   08/30/22 129/82   04/25/22 128/88   09/20/21 138/80       History   Smoking Status     Never Smoker   Smokeless Tobacco     Never Used       Labs:  Lab Results   Component Value Date    A1C 8.7 08/30/2022     Lab Results   Component Value Date     08/30/2022     04/25/2022     Lab Results   Component Value Date    LDL 50 08/30/2022     Direct Measure HDL   Date Value Ref Range Status   08/30/2022 38 (L) >=40 mg/dL Final   ]  GFR Estimate   Date Value Ref Range Status   08/30/2022 56 (L) >60 mL/min/1.73m2 Final     Comment:     Effective December 21, 2021 eGFRcr in adults is calculated using the 2021 CKD-EPI creatinine equation which includes age and " gender (Reji ortega al., [facility], DOI: 10.1056/UWAWiy8514832)   06/21/2021 >60 >60 mL/min/1.73m2 Final     GFR Estimate If Black   Date Value Ref Range Status   06/21/2021 >60 >60 mL/min/1.73m2 Final     Lab Results   Component Value Date    CR 1.34 08/30/2022     No results found for: MICROALBUMIN    Healthy Eating:  Healthy Eating Assessed Today: Yes  Meal planning/habits: None  Meals include: Breakfast, Dinner  Breakfast: 9-10am: protein-fish mostly, sometimes chicken/pork, rice, one cup(white, silvia), vegetables, tea, unsweetened  Dinner: 6-7pm: protein-fish mostly, sometimes chicken/pork, rice, one cup(white, silvia), vegetables, bottled water  Snacks: banana or plum  Beverages: Water, Tea (once in a long time he will have something sweet)  Has patient met with a dietitian in the past?:  (unsure)    Being Active:  Being Active Assessed Today: Yes (walk every morning, sometimes two times per day: about 60 minutes)  Exercise:: Yes (patient uses his treadmill for exercise when the weather is cold)  Days per week of moderate to strenuous exercise (like a brisk walk): 6  On average, minutes per day of exercise at this level: 60  Exercise Minutes per Week: 360    Monitoring:  Monitoring Assessed Today: Yes  Did patient bring glucose meter to appointment? : No  Blood Glucose Meter: Accu-chek  Times checking blood sugar at home (number): 3  Times checking blood sugar at home (per): Week        Taking Medications:  Diabetes Medication(s)     Biguanides       metFORMIN (GLUCOPHAGE XR) 500 MG 24 hr tablet    TAKE 2 TABLETS BY MOUTH TWICE A DAY WITH FOOD    Sodium-Glucose Co-Transporter 2 (SGLT2) Inhibitors       canagliflozin (INVOKANA) 100 MG tablet    Take 1 tablet (100 mg) by mouth every morning (before breakfast)    Sulfonylureas       glipiZIDE (GLUCOTROL) 5 MG tablet    [GLIPIZIDE (GLUCOTROL) 5 MG TABLET] TAKE 2 TABLETS BY MOUTH TWICE A DAY WITH MEALS          Taking Medication Assessed Today: Yes  Current Treatments:  Diet, Oral Medication (taken by mouth)  Problems taking diabetes medications regularly?: No (patient states he may forget once in awhile, but not often)    Problem Solving:                 Reducing Risks:  Diabetes Risks: Age over 45 years, Ethnicity  Has dilated eye exam at least once a year?: No (last check was 2-3 years ago he has to pay for his eye exams so it is not affordable.)  Sees dentist every 6 months?: No (no, not covered under Medicare, so not affordable.)    Healthy Coping:  Emotional response to diabetes: Ready to learn, Concern for health and well-being  Informal Support system:: Family  Stage of change: ACTION (Actively working towards change)  Patient Activation Measure Survey Score:  No flowsheet data found.      Care Plan and Education Provided:  Care Plan: Diabetes   Updates made by Tala Reza RD since 9/13/2022 12:00 AM      Problem: HbA1C Not In Goal       Goal: Establish Regular Follow-Ups with PCP       Task: Discuss with PCP the recommended timing for patient's next follow up visit(s)    Responsible User: Tala Reza RD      Task: Discuss schedule for PCP visits with patient    Responsible User: Tala Reza RD      Goal: Get HbA1C Level in Goal       Task: Educate patient on diabetes education self-management topics    Responsible User: Tala Reza RD      Task: Educate patient on benefits of regular glucose monitoring    Responsible User: Tala Reza RD      Task: Refer patient to appropriate extended care team member, as needed (Medication Therapy Management, Behavioral Health, Physical Therapy, etc.)    Responsible User: Tala Reza RD      Task: Discuss diabetes treatment plan with patient    Responsible User: Tala Reza RD      Problem: Diabetes Self-Management Education Needed to Optimize Self-Care Behaviors       Goal: Understand diabetes pathophysiology and disease progression       Task: Provide education on diabetes pathophysiology and disease  progression specfic to patient's diabetes type    Responsible User: Tala Reza RD      Goal: Healthy Eating - follow a healthy eating pattern for diabetes       Task: Provide education on portion control and consistency in amount, composition and timing of food intake Completed 9/13/2022   Responsible User: Tala Reza RD      Task: Provide education on managing carbohydrate intake (carbohydrate counting, plate planning method, etc.)    Responsible User: Tala Reza RD      Task: Provide education on weight management    Responsible User: Tala Reza RD      Task: Provide education on heart healthy eating    Responsible User: Tala Reza RD      Task: Provide education on eating out    Responsible User: Tala Reza RD      Task: Develop individualized healthy eating plan with patient    Responsible User: Tala Reza RD      Goal: Being Active - get regular physical activity, working up to at least 150 minutes per week       Task: Provide education on relationship of activity to glucose and precautions to take if at risk for low glucose    Responsible User: Tala Reza RD      Task: Discuss barriers to physical activity with patient    Responsible User: Tala Reza RD      Task: Develop physical activity plan with patient    Responsible User: Tala Reza RD      Task: Explore community resources including walking groups, assistance programs, and home videos    Responsible User: Tala Reza RD      Goal: Monitoring - monitor glucose and ketones as directed    This Visit's Progress: 20%   Note:    Check blood sugar twice daily: fasting and two hours after a meal.      Task: Provide education on blood glucose monitoring (purpose, proper technique, frequency, glucose targets, interpreting results, when to use glucose control solution, sharps disposal)    Responsible User: Tala Reza RD      Task: Provide education on continuous glucose monitoring (sensor placement, use of  gabriel or /reader, understanding glucose trends, alerts and alarms, differences between sensor glucose and blood glucose)    Responsible User: Tala Reza RD      Task: Provide education on ketone monitoring (when to monitor, frequency, etc.)    Responsible User: Tala Reza RD      Goal: Taking Medication - patient is consistently taking medications as directed       Task: Provide education on action of prescribed medication, including when to take and possible side effects    Responsible User: Tala Reza RD      Task: Provide education on insulin and injectable diabetes medications, including administration, storage, site selection and rotation for injection sites    Responsible User: Tlaa Reza RD      Task: Discuss barriers to medication adherence with patient and provide management technique ideas as appropriate    Responsible User: Tala Reza RD      Task: Provide education on frequency and refill details of medications    Responsible User: Tala Reza RD      Goal: Problem Solving - know how to prevent and manage short-term diabetes complications       Task: Provide education on high blood glucose - causes, signs/symptoms, prevention and treatment    Responsible User: Tala Reza RD      Task: Provide education on low blood glucose - causes, signs/symptoms, prevention, treatment, carrying a carbohydrate source at all times, and medical identification    Responsible User: Tala Reza RD      Task: Provide education on safe travel with diabetes    Responsible User: Tala Reza RD      Task: Provide education on how to care for diabetes on sick days    Responsible User: Tala Reza RD      Task: Provide education on when to call a health care provider    Responsible User: Tala Reza RD      Goal: Reducing Risks - know how to prevent and treat long-term diabetes complications       Task: Provide education on major complications of diabetes, prevention, early  diagnostic measures and treatment of complications    Responsible User: Tala Reza RD      Task: Provide education on recommended care for dental, eye and foot health    Responsible User: Tala Reza RD      Task: Provide education on Hemoglobin A1c - goals and relationship to blood glucose levels    Responsible User: Tala Reza RD      Task: Provide education on recommendations for heart health - lipid levels and goals, blood pressure and goals, and aspirin therapy, if indicated    Responsible User: Tala Reza RD      Task: Provide education on tobacco cessation    Responsible User: Tala Reza RD      Goal: Healthy Coping - use available resources to cope with the challenges of managing diabetes       Task: Discuss recognizing feelings about having diabetes    Responsible User: Tala Reza RD      Task: Provide education on the benefits of making appropriate lifestyle changes    Responsible User: Tala Reza RD      Task: Provide education on benefits of utilizing support systems    Responsible User: Tala Reza RD      Task: Discuss methods for coping with stress    Responsible User: Tala Reza RD      Task: Provide education on when to seek professional counseling    Responsible User: Tala Reza RD              Time Spent: 60 minutes  Encounter Type: Individual    Any diabetes medication dose changes were made via the CDE Protocol per the patient's primary care provider. A copy of this encounter was shared with the provider.

## 2022-09-28 ENCOUNTER — VIRTUAL VISIT (OUTPATIENT)
Dept: EDUCATION SERVICES | Facility: CLINIC | Age: 77
End: 2022-09-28
Payer: MEDICARE

## 2022-09-28 DIAGNOSIS — R80.9 TYPE 2 DIABETES MELLITUS WITH MICROALBUMINURIA, WITHOUT LONG-TERM CURRENT USE OF INSULIN (H): ICD-10-CM

## 2022-09-28 DIAGNOSIS — E11.29 TYPE 2 DIABETES MELLITUS WITH MICROALBUMINURIA, WITHOUT LONG-TERM CURRENT USE OF INSULIN (H): ICD-10-CM

## 2022-09-28 DIAGNOSIS — N18.31 CHRONIC KIDNEY DISEASE, STAGE 3A (H): ICD-10-CM

## 2022-09-28 PROCEDURE — G0108 DIAB MANAGE TRN  PER INDIV: HCPCS | Mod: 95 | Performed by: DIETITIAN, REGISTERED

## 2022-09-28 RX ORDER — GLIPIZIDE 5 MG/1
TABLET ORAL
Qty: 360 TABLET | Refills: 1
Start: 2022-09-28 | End: 2022-11-21

## 2022-09-28 RX ORDER — GLIPIZIDE 5 MG/1
5 TABLET ORAL
Qty: 360 TABLET | Refills: 1 | Status: SHIPPED | OUTPATIENT
Start: 2022-09-28 | End: 2022-09-28

## 2022-09-28 NOTE — LETTER
9/28/2022         RE: Vashti King  [address]  [place] [address]        Dear Colleague,    Thank you for referring your patient, Vashti King, to the [facility]. Please see a copy of my visit note below.    Educator made an error in discontinuing the Invokana 100 mg, so the medication was reordered as Dr. Banda had ordered, 100 mg daily.     Type of Service: Telephone Visit/ 30 minutes through professional [employer]  # [id number]    Originating Location (Patient Location): Home  Distant Location (Provider Location): [facility]  Mode of Communication:  Telephone    Telephone Visit Start Time: 9:30p  Telephone Visit End Time (telephone visit stop time): 10a    How would patient like to obtain AVS? [facility]     Diabetes Education Follow-up    Subjective/Objective:     Review of goals and BG log with Vashti King. Last date of communication was: 9/13/22.    Diabetes is being managed with   Lifestyle (diet/activity), Diabetes Medications   Diabetes Medication(s)     Biguanides       metFORMIN (GLUCOPHAGE XR) 500 MG 24 hr tablet    TAKE 2 TABLETS BY MOUTH TWICE A DAY WITH FOOD    Sodium-Glucose Co-Transporter 2 (SGLT2) Inhibitors       canagliflozin (INVOKANA) 100 MG tablet    Take 1 tablet (100 mg) by mouth every morning (before breakfast)    Sulfonylureas       glipiZIDE (GLUCOTROL) 5 MG tablet    Take 1 tablet (5 mg) by mouth 2 times daily (before meals)          BG/ Log:   FBS: 149,155,136,68,103,115,67,132  PP: 247,223,220,273,204,277,215    Assessment:   Follow up visit for Vashti, through a professional [employer] , Vashti's son Maite was also on the telephone visit.Vashti had two BG < 70 mg/dl readings in the last week, he did not have s/s of hypoglycemia.  He reported that if his appetite is poor the night before, he has a lower FBS the next day. I recommended that Vashti decrease his Glipizide in the pm to one tablet and if his BG goes  below 70, he should have a small glass of juice.  He continues to walk daily with his wife, he has a treadmill to use when the weather is colder. He has added in tea, 1-2 mugs/day along with 1-2 glasses of water. WE did discussed working on increasing the water to 3-4 cups/day.     Plan/Response:  See Patient Instructions for co-developed, patient-stated behavior change goals.    1. Check blood glucose twice daily: fasting and two hours after a meal.  2. Take Glipizide two tablets in the am and one tablet in the pm.  3. Increase water to 3-4 cups/day.  4. If BG is below 70, have a small glass of fruit juice or regular soda.  5. Consult with MTM regarding adding in an additional medication to lower PP without increasing risk of hypoglycemia.   6. Follow up with educator on 10/19/22              Any diabetes medication dose changes were made via the CDE Protocol and Collaborative Practice Agreement with the patient's primary care provider. A copy of this encounter was shared with the provider.

## 2022-09-29 NOTE — PROGRESS NOTES
Educator made an error in discontinuing the Invokana 100 mg, so the medication was reordered as Dr. Banda had ordered, 100 mg daily.     Type of Service: Telephone Visit/ 30 minutes through professional [employer]  # [id number]    Originating Location (Patient Location): Home  Distant Location (Provider Location): [facility]  Mode of Communication:  Telephone    Telephone Visit Start Time: 9:30p  Telephone Visit End Time (telephone visit stop time): 10a    How would patient like to obtain AVS? [facility]     Diabetes Education Follow-up    Subjective/Objective:     Review of goals and BG log with Vashti King. Last date of communication was: 9/13/22.    Diabetes is being managed with   Lifestyle (diet/activity), Diabetes Medications   Diabetes Medication(s)     Biguanides       metFORMIN (GLUCOPHAGE XR) 500 MG 24 hr tablet    TAKE 2 TABLETS BY MOUTH TWICE A DAY WITH FOOD    Sodium-Glucose Co-Transporter 2 (SGLT2) Inhibitors       canagliflozin (INVOKANA) 100 MG tablet    Take 1 tablet (100 mg) by mouth every morning (before breakfast)    Sulfonylureas       glipiZIDE (GLUCOTROL) 5 MG tablet    Take 1 tablet (5 mg) by mouth 2 times daily (before meals)          BG/ Log:   FBS: 149,155,136,68,103,115,67,132  PP: 247,223,220,273,204,277,215    Assessment:   Follow up visit for Vashti, through a professional [employer] , Vashti's son Maite was also on the telephone visit.Vashti had two BG < 70 mg/dl readings in the last week, he did not have s/s of hypoglycemia.  He reported that if his appetite is poor the night before, he has a lower FBS the next day. I recommended that Vashti decrease his Glipizide in the pm to one tablet and if his BG goes below 70, he should have a small glass of juice.  He continues to walk daily with his wife, he has a treadmill to use when the weather is colder. He has added in tea, 1-2 mugs/day along with 1-2 glasses of water. WE did discussed working on  increasing the water to 3-4 cups/day.     Plan/Response:  See Patient Instructions for co-developed, patient-stated behavior change goals.    1. Check blood glucose twice daily: fasting and two hours after a meal.  2. Take Glipizide two tablets in the am and one tablet in the pm.  3. Increase water to 3-4 cups/day.  4. If BG is below 70, have a small glass of fruit juice or regular soda.  5. Consult with MTM regarding adding in an additional medication to lower PP without increasing risk of hypoglycemia.   6. Follow up with educator on 10/19/22              Any diabetes medication dose changes were made via the CDE Protocol and Collaborative Practice Agreement with the patient's primary care provider. A copy of this encounter was shared with the provider.

## 2022-10-19 ENCOUNTER — VIRTUAL VISIT (OUTPATIENT)
Dept: EDUCATION SERVICES | Facility: CLINIC | Age: 77
End: 2022-10-19
Payer: MEDICARE

## 2022-10-19 DIAGNOSIS — E11.29 TYPE 2 DIABETES MELLITUS WITH MICROALBUMINURIA, WITHOUT LONG-TERM CURRENT USE OF INSULIN (H): ICD-10-CM

## 2022-10-19 DIAGNOSIS — R80.9 TYPE 2 DIABETES MELLITUS WITH MICROALBUMINURIA, WITHOUT LONG-TERM CURRENT USE OF INSULIN (H): ICD-10-CM

## 2022-10-19 PROCEDURE — 98967 PH1 ASSMT&MGMT NQHP 11-20: CPT | Performed by: DIETITIAN, REGISTERED

## 2022-10-19 RX ORDER — CARVEDILOL 25 MG/1
TABLET, FILM COATED ORAL
Qty: 100 STRIP | Refills: 3 | Status: SHIPPED | OUTPATIENT
Start: 2022-10-19 | End: 2023-08-08

## 2022-10-19 RX ORDER — LANCETS
100 EACH MISCELLANEOUS DAILY
Qty: 100 EACH | Refills: 4 | Status: SHIPPED | OUTPATIENT
Start: 2022-10-19 | End: 2024-04-25

## 2022-10-19 NOTE — PATIENT INSTRUCTIONS
1.Check blood glucose twice daily: fasting and two hours after a meal.  2. Take Glipizide two tablets in the am and one tablet in the pm.  3. Increase water to 3-4 cups/day.  4. If BG is below 70, have a small glass of fruit juice or regular soda.  5. Follow up with Diabetes educator on 11/16/22: telephone visit

## 2022-10-19 NOTE — PROGRESS NOTES
Type of Service: Telephone Visit/ 20 minutes through professional     Originating Location (Patient Location): Home  Distant Location (Provider Location): [facility]  Mode of Communication:  Telephone    Telephone Visit Start Time: 11:30 a  Telephone Visit End Time (telephone visit stop time): 11:50    How would patient like to obtain AVS? [facility]     Diabetes Education Follow-up    Subjective/Objective:    Vashti King sent in blood glucose log for review. Last date of communication was: 9/28/22.    Diabetes is being managed with   Lifestyle (diet/activity), Diabetes Medications   Diabetes Medication(s)     Biguanides       metFORMIN (GLUCOPHAGE XR) 500 MG 24 hr tablet    TAKE 2 TABLETS BY MOUTH TWICE A DAY WITH FOOD    Sodium-Glucose Co-Transporter 2 (SGLT2) Inhibitors       canagliflozin (INVOKANA) 100 MG tablet    Take 1 tablet (100 mg) by mouth every morning (before breakfast)    Sulfonylureas       glipiZIDE (GLUCOTROL) 5 MG tablet    Take two tablets( 10 mg) with your morning meal and one tablet (5 mg) with your evening meal.          BG/ Log:   FBS: 90,130,105,113,79,142,137,97,107  PP: 233,266,216,382,194,380,168,164      Assessment:  Follow up visit for Vashti, through [employer] .  Patient's son, Maite was on the telephone call today also. Vashti's FBS have improved and are mostly at target, his PP remain elevated.  Patient will increase to two 5 mg Glipizide tablets with his am meal. He has had no dizziness in the past few weeks, no other s/s of hypoglycemia.  He is working on increasing his water intake and he is not drinking any sugary beverages.  Since the weather has turned cooler, he has been using his treadmill indoors. I will call and assess BG in one month, if PP have not improved I will consult with PCP regarding a medication change.      Plan/Response:  See Patient Instructions for co-developed, patient-stated behavior change goals.    Check blood  glucose twice daily: fasting and two hours after a meal.  2. Take Glipizide two tablets in the am and one tablet in the pm.  3. Increase water to 3-4 cups/day.  4. If BG is below 70, have a small glass of fruit juice or regular soda.  5. Follow up with Diabetes educator on 11/16/22: telephone visit       Any diabetes medication dose changes were made via the CDE Protocol and Collaborative Practice Agreement with the patient's primary care provider. A copy of this encounter was shared with the provider.

## 2022-10-19 NOTE — LETTER
10/19/2022         RE: Vashti King  [address]  [place] [address]        Dear Colleague,    Thank you for referring your patient, Vashti King, to the [facility]. Please see a copy of my visit note below.    Type of Service: Telephone Visit/ 20 minutes through professional     Originating Location (Patient Location): Home  Distant Location (Provider Location): [facility]  Mode of Communication:  Telephone    Telephone Visit Start Time: 11:30 a  Telephone Visit End Time (telephone visit stop time): 11:50    How would patient like to obtain AVS? [facility]     Diabetes Education Follow-up    Subjective/Objective:    Vashti King sent in blood glucose log for review. Last date of communication was: 9/28/22.    Diabetes is being managed with   Lifestyle (diet/activity), Diabetes Medications   Diabetes Medication(s)     Biguanides       metFORMIN (GLUCOPHAGE XR) 500 MG 24 hr tablet    TAKE 2 TABLETS BY MOUTH TWICE A DAY WITH FOOD    Sodium-Glucose Co-Transporter 2 (SGLT2) Inhibitors       canagliflozin (INVOKANA) 100 MG tablet    Take 1 tablet (100 mg) by mouth every morning (before breakfast)    Sulfonylureas       glipiZIDE (GLUCOTROL) 5 MG tablet    Take two tablets( 10 mg) with your morning meal and one tablet (5 mg) with your evening meal.          BG/ Log:   FBS: 90,130,105,113,79,142,137,97,107  PP: 233,266,216,382,194,380,168,164      Assessment:  Follow up visit for Vashti, through keri .  Patient's son, Maite was on the telephone call today also. Vashti's FBS have improved and are mostly at target, his PP remain elevated.  Patient will increase to two 5 mg Glipizide tablets with his am meal. He has had no dizziness in the past few weeks, no other s/s of hypoglycemia.  He is working on increasing his water intake and he is not drinking any sugary beverages.  Since the weather has turned cooler, he has been using his treadmill  indoors. I will call and assess BG in one month, if PP have not improved I will consult with PCP regarding a medication change.      Plan/Response:  See Patient Instructions for co-developed, patient-stated behavior change goals.    Check blood glucose twice daily: fasting and two hours after a meal.  2. Take Glipizide two tablets in the am and one tablet in the pm.  3. Increase water to 3-4 cups/day.  4. If BG is below 70, have a small glass of fruit juice or regular soda.  5. Follow up with Diabetes educator on 11/16/22: telephone visit       Any diabetes medication dose changes were made via the CDE Protocol and Collaborative Practice Agreement with the patient's primary care provider. A copy of this encounter was shared with the provider.

## 2022-11-02 DIAGNOSIS — E78.5 HYPERLIPIDEMIA: ICD-10-CM

## 2022-11-02 RX ORDER — ATORVASTATIN CALCIUM 40 MG/1
TABLET, FILM COATED ORAL
Qty: 90 TABLET | Refills: 2 | Status: SHIPPED | OUTPATIENT
Start: 2022-11-02

## 2022-11-03 NOTE — TELEPHONE ENCOUNTER
"Last Written Prescription Date:  5/3/22  Last Fill Quantity: 90,  # refills: 1   Last office visit provider:  8/30/22     Requested Prescriptions   Pending Prescriptions Disp Refills     atorvastatin (LIPITOR) 40 MG tablet [Pharmacy Med Name: ATORVASTATIN 40 MG TABLET] 90 tablet 1     Sig: TAKE 1 TABLET BY MOUTH EVERY DAY       Statins Protocol Passed - 11/2/2022 12:17 AM        Passed - LDL on file in past 12 months     Recent Labs   Lab Test 08/30/22  0910   LDL 50             Passed - No abnormal creatine kinase in past 12 months     No lab results found.             Passed - Recent (12 mo) or future (30 days) visit within the authorizing provider's specialty     Patient has had an office visit with the authorizing provider or a provider within the authorizing providers department within the previous 12 mos or has a future within next 30 days. See \"Patient Info\" tab in inbasket, or \"Choose Columns\" in Meds & Orders section of the refill encounter.              Passed - Medication is active on med list        Passed - Patient is age 18 or older             Jeni Browning RN 11/02/22 7:02 PM  "

## 2022-11-16 ENCOUNTER — VIRTUAL VISIT (OUTPATIENT)
Dept: EDUCATION SERVICES | Facility: CLINIC | Age: 77
End: 2022-11-16
Payer: MEDICARE

## 2022-11-16 DIAGNOSIS — E11.29 TYPE 2 DIABETES MELLITUS WITH MICROALBUMINURIA, WITHOUT LONG-TERM CURRENT USE OF INSULIN (H): Primary | ICD-10-CM

## 2022-11-16 DIAGNOSIS — R80.9 TYPE 2 DIABETES MELLITUS WITH MICROALBUMINURIA, WITHOUT LONG-TERM CURRENT USE OF INSULIN (H): Primary | ICD-10-CM

## 2022-11-16 PROCEDURE — 98967 PH1 ASSMT&MGMT NQHP 11-20: CPT | Performed by: DIETITIAN, REGISTERED

## 2022-11-16 NOTE — LETTER
11/16/2022         RE: Vashti King  [address]  [place] [address]        Dear Colleague,    Thank you for referring your patient, Vashti King, to the [facility]. Please see a copy of my visit note below.    Type of Service: Telephone Visit 20 minutes/ no professional  was available, so patient's son, Maite King interpreted today's phone visit    Originating Location (Patient Location): Home  Distant Location (Provider Location): [facility]  Mode of Communication:  Telephone    Telephone Visit Start Time: 10:38  Telephone Visit End Time (telephone visit stop time): 10:58    How would patient like to obtain AVS? [facility]     Diabetes Education Follow-up    Subjective/Objective:    Follow up on BG log and goals with Vashti King.  Vashti's son, Maite King was also on the phone call and he interpreted today's visit, as there was no professional [facility]  available. Last date of communication was: 10/19/22.    Diabetes is being managed with   Lifestyle (diet/activity), Diabetes Medications   Diabetes Medication(s)     Biguanides       metFORMIN (GLUCOPHAGE XR) 500 MG 24 hr tablet    TAKE 2 TABLETS BY MOUTH TWICE A DAY WITH FOOD    Sodium-Glucose Co-Transporter 2 (SGLT2) Inhibitors       canagliflozin (INVOKANA) 100 MG tablet    Take 1 tablet (100 mg) by mouth every morning (before breakfast)    Sulfonylureas       glipiZIDE (GLUCOTROL) 5 MG tablet    Take two tablets( 10 mg) with your morning meal and one tablet (5 mg) with your evening meal.          BG/ Log:   FBS: 140,135,101,91,87,91,118,144  PP: 137,156,188,179,255,272,167    Assessment:  Vashti increased Glipizide to two 5 mg tablets in the am after our last visit on 10/19/22, her post meal BG have improved. He explained the PP elevations were due to larger portions of noodles and sticky rice. He has not had any s/s of hypoglycemia or < 70 mg/dl readings.  He is walking on his  treadmill 3+x/week He has increased his water and unsweetened tea intake.             Plan/Response:  See Patient Instructions for co-developed, patient-stated behavior change goals.    1. Check blood glucose once daily: alternate between checking in the morning before eating or two hours after a meal.  2.   If BG is below 70, or if you feel shaky, sweaty or dizzy when your blood sugar is below 80, have a small glass of fruit juice or regular soda.  3  . Follow up with Diabetes educator on 12/6/22 to recheck A1C in clinic.             Any diabetes medication dose changes were made via the CDE Protocol and Collaborative Practice Agreement with the patient's primary care provider. A copy of this encounter was shared with the provider.

## 2022-11-16 NOTE — PROGRESS NOTES
Type of Service: Telephone Visit 20 minutes/ no professional  was available, so patient's son, Maite King interpreted today's phone visit    Originating Location (Patient Location): Home  Distant Location (Provider Location): [facility]  Mode of Communication:  Telephone    Telephone Visit Start Time: 10:38  Telephone Visit End Time (telephone visit stop time): 10:58    How would patient like to obtain AVS? [facility]     Diabetes Education Follow-up    Subjective/Objective:    Follow up on BG log and goals with Vashti King.  Vashti's son, Maite King was also on the phone call and he interpreted today's visit, as there was no professional [facility]  available. Last date of communication was: 10/19/22.    Diabetes is being managed with   Lifestyle (diet/activity), Diabetes Medications   Diabetes Medication(s)     Biguanides       metFORMIN (GLUCOPHAGE XR) 500 MG 24 hr tablet    TAKE 2 TABLETS BY MOUTH TWICE A DAY WITH FOOD    Sodium-Glucose Co-Transporter 2 (SGLT2) Inhibitors       canagliflozin (INVOKANA) 100 MG tablet    Take 1 tablet (100 mg) by mouth every morning (before breakfast)    Sulfonylureas       glipiZIDE (GLUCOTROL) 5 MG tablet    Take two tablets( 10 mg) with your morning meal and one tablet (5 mg) with your evening meal.          BG/ Log:   FBS: 140,135,101,91,87,91,118,144  PP: 137,156,188,179,255,272,167    Assessment:  Vashti increased Glipizide to two 5 mg tablets in the am after our last visit on 10/19/22, her post meal BG have improved. He explained the PP elevations were due to larger portions of noodles and sticky rice. He has not had any s/s of hypoglycemia or < 70 mg/dl readings.  He is walking on his treadmill 3+x/week He has increased his water and unsweetened tea intake.             Plan/Response:  See Patient Instructions for co-developed, patient-stated behavior change goals.    1. Check blood glucose once daily: alternate between checking in the  morning before eating or two hours after a meal.  2.   If BG is below 70, or if you feel shaky, sweaty or dizzy when your blood sugar is below 80, have a small glass of fruit juice or regular soda.  3  . Follow up with Diabetes educator on 12/6/22 to recheck A1C in clinic.             Any diabetes medication dose changes were made via the CDE Protocol and Collaborative Practice Agreement with the patient's primary care provider. A copy of this encounter was shared with the provider.

## 2022-11-16 NOTE — PATIENT INSTRUCTIONS
Check blood glucose once daily: alternate between checking in the morning before eating or two hours after a meal.  2.   If BG is below 70, or if you feel shaky, sweaty or dizzy when your blood sugar is below 80, have a small glass of fruit juice or regular soda.  3  . Follow up with Diabetes educator on 12/6/22 to recheck A1C in clinic.

## 2022-11-18 DIAGNOSIS — E11.29 TYPE 2 DIABETES MELLITUS WITH MICROALBUMINURIA, WITHOUT LONG-TERM CURRENT USE OF INSULIN (H): ICD-10-CM

## 2022-11-18 DIAGNOSIS — R80.9 TYPE 2 DIABETES MELLITUS WITH MICROALBUMINURIA, WITHOUT LONG-TERM CURRENT USE OF INSULIN (H): ICD-10-CM

## 2022-11-19 NOTE — TELEPHONE ENCOUNTER
"Routing refill request to provider for review/approval because:  Labs out of range:  cr    Last Written Prescription Date:  9/28/22  No print out  Last Fill Quantity: ,  # refills:    Last office visit provider:  8/30/22     Requested Prescriptions   Pending Prescriptions Disp Refills     glipiZIDE (GLUCOTROL) 5 MG tablet [Pharmacy Med Name: GLIPIZIDE 5 MG TABLET] 360 tablet 1     Sig: TAKE 2 TABLETS BY MOUTH TWICE A DAY WITH MEALS       Sulfonylurea Agents Failed - 11/18/2022  1:30 AM        Failed - Patient has a recent creatinine (normal) within the past 12 mos.     Recent Labs   Lab Test 08/30/22  0910   CR 1.34*       Ok to refill medication if creatinine is low          Passed - Patient has documented A1c within the specified period of time.     If HgbA1C is 8 or greater, it needs to be on file within the past 3 months.  If less than 8, must be on file within the past 6 months.     Recent Labs   Lab Test 08/30/22  0910   A1C 8.7*             Passed - Medication is active on med list        Passed - Patient is age 18 or older        Passed - Recent (6 mo) or future (30 days) visit within the authorizing provider's specialty     Patient had office visit in the last 6 months or has a visit in the next 30 days with authorizing provider or within the authorizing provider's specialty.  See \"Patient Info\" tab in inbasket, or \"Choose Columns\" in Meds & Orders section of the refill encounter.                 Jeni Browning RN 11/18/22 7:11 PM  "

## 2022-11-21 RX ORDER — GLIPIZIDE 5 MG/1
TABLET ORAL
Qty: 360 TABLET | Refills: 1 | Status: SHIPPED | OUTPATIENT
Start: 2022-11-21 | End: 2023-05-18

## 2022-11-26 DIAGNOSIS — I10 ESSENTIAL HYPERTENSION: ICD-10-CM

## 2022-11-28 RX ORDER — DILTIAZEM HYDROCHLORIDE 240 MG/1
CAPSULE, COATED, EXTENDED RELEASE ORAL
Qty: 90 CAPSULE | Refills: 1 | Status: SHIPPED | OUTPATIENT
Start: 2022-11-28 | End: 2023-07-02

## 2022-11-28 NOTE — TELEPHONE ENCOUNTER
"Routing refill request to provider for review/approval because:  Labs out of range:  cr    Last Written Prescription Date:  3/9/22  Last Fill Quantity: 90,  # refills: 1   Last office visit provider:  8/30/22     Requested Prescriptions   Pending Prescriptions Disp Refills     diltiazem ER COATED BEADS (CARDIZEM CD/CARTIA XT) 240 MG 24 hr capsule [Pharmacy Med Name: DILTIAZEM 24H ER(CD) 240 MG CP] 90 capsule 1     Sig: TAKE 1 CAPSULE BY MOUTH EVERY DAY       Calcium Channel Blockers Protocol  Failed - 11/26/2022  7:24 AM        Failed - Normal serum creatinine on file in past 12 months     Recent Labs   Lab Test 08/30/22  0910   CR 1.34*       Ok to refill medication if creatinine is low          Passed - Blood pressure under 140/90 in past 12 months     BP Readings from Last 3 Encounters:   08/30/22 129/82   04/25/22 128/88   09/20/21 138/80                 Passed - Normal ALT in past 12 months     Recent Labs   Lab Test 08/30/22  0910   ALT 26             Passed - Recent (12 mo) or future (30 days) visit within the authorizing provider's specialty     Patient has had an office visit with the authorizing provider or a provider within the authorizing providers department within the previous 12 mos or has a future within next 30 days. See \"Patient Info\" tab in inbasket, or \"Choose Columns\" in Meds & Orders section of the refill encounter.              Passed - Medication is active on med list        Passed - Patient is age 18 or older             Jeni Browning RN 11/27/22 7:13 PM  "

## 2022-12-06 ENCOUNTER — ALLIED HEALTH/NURSE VISIT (OUTPATIENT)
Dept: EDUCATION SERVICES | Facility: CLINIC | Age: 77
End: 2022-12-06
Payer: MEDICARE

## 2022-12-06 VITALS — BODY MASS INDEX: 31.62 KG/M2 | WEIGHT: 187.5 LBS

## 2022-12-06 DIAGNOSIS — E11.29 TYPE 2 DIABETES MELLITUS WITH MICROALBUMINURIA, WITHOUT LONG-TERM CURRENT USE OF INSULIN (H): ICD-10-CM

## 2022-12-06 DIAGNOSIS — E11.29 TYPE 2 DIABETES MELLITUS WITH MICROALBUMINURIA, WITHOUT LONG-TERM CURRENT USE OF INSULIN (H): Primary | ICD-10-CM

## 2022-12-06 DIAGNOSIS — E83.52 HYPERCALCEMIA: ICD-10-CM

## 2022-12-06 DIAGNOSIS — N18.31 CHRONIC KIDNEY DISEASE, STAGE 3A (H): ICD-10-CM

## 2022-12-06 DIAGNOSIS — E83.52 HYPERCALCEMIA: Primary | ICD-10-CM

## 2022-12-06 DIAGNOSIS — R80.9 TYPE 2 DIABETES MELLITUS WITH MICROALBUMINURIA, WITHOUT LONG-TERM CURRENT USE OF INSULIN (H): Primary | ICD-10-CM

## 2022-12-06 DIAGNOSIS — R80.9 TYPE 2 DIABETES MELLITUS WITH MICROALBUMINURIA, WITHOUT LONG-TERM CURRENT USE OF INSULIN (H): ICD-10-CM

## 2022-12-06 LAB
ALBUMIN SERPL BCG-MCNC: 4.5 G/DL (ref 3.5–5.2)
ALP SERPL-CCNC: 58 U/L (ref 40–129)
ALT SERPL W P-5'-P-CCNC: 23 U/L (ref 10–50)
ANION GAP SERPL CALCULATED.3IONS-SCNC: 15 MMOL/L (ref 7–15)
AST SERPL W P-5'-P-CCNC: 39 U/L (ref 10–50)
BILIRUB DIRECT SERPL-MCNC: <0.2 MG/DL (ref 0–0.3)
BILIRUB SERPL-MCNC: 0.7 MG/DL
BUN SERPL-MCNC: 21.4 MG/DL (ref 8–23)
CALCIUM SERPL-MCNC: 10.5 MG/DL (ref 8.8–10.2)
CHLORIDE SERPL-SCNC: 101 MMOL/L (ref 98–107)
CREAT SERPL-MCNC: 1.41 MG/DL (ref 0.67–1.17)
DEPRECATED HCO3 PLAS-SCNC: 26 MMOL/L (ref 22–29)
GFR SERPL CREATININE-BSD FRML MDRD: 52 ML/MIN/1.73M2
GLUCOSE SERPL-MCNC: 146 MG/DL (ref 70–99)
HBA1C MFR BLD: 7.5 % (ref 0–5.6)
POTASSIUM SERPL-SCNC: 4.9 MMOL/L (ref 3.4–5.3)
PROT SERPL-MCNC: 8 G/DL (ref 6.4–8.3)
SODIUM SERPL-SCNC: 142 MMOL/L (ref 136–145)

## 2022-12-06 PROCEDURE — 80053 COMPREHEN METABOLIC PANEL: CPT | Performed by: DIETITIAN, REGISTERED

## 2022-12-06 PROCEDURE — 82248 BILIRUBIN DIRECT: CPT | Performed by: DIETITIAN, REGISTERED

## 2022-12-06 PROCEDURE — 36415 COLL VENOUS BLD VENIPUNCTURE: CPT | Performed by: DIETITIAN, REGISTERED

## 2022-12-06 PROCEDURE — 82306 VITAMIN D 25 HYDROXY: CPT | Performed by: DIETITIAN, REGISTERED

## 2022-12-06 PROCEDURE — 97803 MED NUTRITION INDIV SUBSEQ: CPT | Performed by: DIETITIAN, REGISTERED

## 2022-12-06 NOTE — PATIENT INSTRUCTIONS
Check blood sugar once daily: either in the morning before eating or two hours after a meal.  Limit rice and noodles to one fist size per meal.  3. Have fruit two hours after or before a meal.  4. Increase water to four water bottles/day.  5. Walk on treadmill for 10-15 minutes after meals.   6. NO changes in medication today.    The  will call Maite to schedule a follow up with Dr. Banda in March 2023    A1C improved to 7.5% today, last time it was 8.7% in August

## 2022-12-06 NOTE — PROGRESS NOTES
Diabetes Education Follow-up/ 45 minutes through professional  Ishan Oshea    Subjective/Objective:     Review of goals, A1C recheck and BG log with Vashti King. Last date of communication was: 11/16/22.    Diabetes is being managed with   Lifestyle (diet/activity), Diabetes Medications   Diabetes Medication(s)     Biguanides       metFORMIN (GLUCOPHAGE XR) 500 MG 24 hr tablet    TAKE 2 TABLETS BY MOUTH TWICE A DAY WITH FOOD    Sodium-Glucose Co-Transporter 2 (SGLT2) Inhibitors       canagliflozin (INVOKANA) 100 MG tablet    Take 1 tablet (100 mg) by mouth every morning (before breakfast)    Sulfonylureas       glipiZIDE (GLUCOTROL) 5 MG tablet    TAKE 2 TABLETS BY MOUTH TWICE A DAY WITH MEALS          BG/ Log:   FBS: 141, 85  PP: 292,184,259,119,162,247,352,269,201,192,369    Assessment:    A1C improved to 7.5% today, from 8.7% in August 2022. Weight has decreased 3#.  Vashti is noticing the impact of when he has Kapun or Cony on his BG, he reported having lower readings if he has a rice meal. He is having two meals/day, often with fruit following the meal. He is using the treadmill over the winter, 30-60 minutes, sometimes daily or every few days.  He has not s/s of hyperglycemia.  Last eye exam in 2020 or 2021.  We discussed scheduling an exam for 2023, but patient is concerned with the cost as it is not covered until his health insurance.      Plan/Response:  See Patient Instructions for co-developed, patient-stated behavior change goals.  1. Check blood sugar once daily: either in the morning before eating or two hours after a meal.  2. Limit rice and noodles to one fist size per meal.  3. Have fruit two hours after or before a meal.  4. Increase water to four water bottles/day.  5. Walk on treadmill for 10-15 minutes after meals.   6. NO changes in medication today.  7. Follow up visit with PCP in march 2023.         Any diabetes medication dose changes were made via the CDE Protocol and Collaborative  Practice Agreement with the patient's primary care provider. A copy of this encounter was shared with the provider.

## 2022-12-06 NOTE — LETTER
12/6/2022         RE: Vashti King  [address]  [place] [address]        Dear Colleague,    Thank you for referring your patient, Vashti King, to the [facility]. Please see a copy of my visit note below.    Diabetes Education Follow-up/ 45 minutes through professional  Ishan Oshea    Subjective/Objective:     Review of goals, A1C recheck and BG log with Vashti King. Last date of communication was: 11/16/22.    Diabetes is being managed with   Lifestyle (diet/activity), Diabetes Medications   Diabetes Medication(s)     Biguanides       metFORMIN (GLUCOPHAGE XR) 500 MG 24 hr tablet    TAKE 2 TABLETS BY MOUTH TWICE A DAY WITH FOOD    Sodium-Glucose Co-Transporter 2 (SGLT2) Inhibitors       canagliflozin (INVOKANA) 100 MG tablet    Take 1 tablet (100 mg) by mouth every morning (before breakfast)    Sulfonylureas       glipiZIDE (GLUCOTROL) 5 MG tablet    TAKE 2 TABLETS BY MOUTH TWICE A DAY WITH MEALS          BG/ Log:   FBS: 141, 85  PP: 292,184,259,119,162,247,352,269,201,192,369    Assessment:    A1C improved to 7.5% today, from 8.7% in August 2022. Weight has decreased 3#.  Vashti is noticing the impact of when he has Kapun or Cony on his BG, he reported having lower readings if he has a rice meal. He is having two meals/day, often with fruit following the meal. He is using the treadmill over the winter, 30-60 minutes, sometimes daily or every few days.  He has not s/s of hyperglycemia.  Last eye exam in 2020 or 2021.  We discussed scheduling an exam for 2023, but patient is concerned with the cost as it is not covered until his health insurance.      Plan/Response:  See Patient Instructions for co-developed, patient-stated behavior change goals.  1. Check blood sugar once daily: either in the morning before eating or two hours after a meal.  2. Limit rice and noodles to one fist size per meal.  3. Have fruit two hours after or before a meal.  4. Increase water to  four water bottles/day.  5. Walk on treadmill for 10-15 minutes after meals.   6. NO changes in medication today.  7. Follow up visit with PCP in march 2023.         Any diabetes medication dose changes were made via the CDE Protocol and Collaborative Practice Agreement with the patient's primary care provider. A copy of this encounter was shared with the provider.

## 2022-12-07 DIAGNOSIS — E83.52 HYPERCALCEMIA: Primary | ICD-10-CM

## 2022-12-09 DIAGNOSIS — E83.52 HYPERCALCEMIA: Primary | ICD-10-CM

## 2022-12-11 LAB — DEPRECATED CALCIDIOL+CALCIFEROL SERPL-MC: 31 UG/L (ref 20–75)

## 2022-12-13 ENCOUNTER — TELEPHONE (OUTPATIENT)
Dept: FAMILY MEDICINE | Facility: CLINIC | Age: 77
End: 2022-12-13

## 2022-12-13 NOTE — TELEPHONE ENCOUNTER
----- Message from Edison Banda MD sent at 12/9/2022  5:16 PM CST -----  Call:  Calcium levels have been high the last two checks.  Needs some further investigation.  I tried to add on labs, but they don't seem to be able to do them  Should have redraw of labs next week.

## 2022-12-13 NOTE — TELEPHONE ENCOUNTER
"Called patient with  [facility]. Left patient a voicemail to call back.  \"Okay to relay message upon returned call\"   "

## 2022-12-15 NOTE — TELEPHONE ENCOUNTER
Pt's son Sha (c2c on file) I relay provider's mg below to him and have pt schedule for calcium recheck with lab on 12/20/22. Please put in lab orders if haven't done so. Thank you.

## 2022-12-20 ENCOUNTER — TELEPHONE (OUTPATIENT)
Dept: FAMILY MEDICINE | Facility: CLINIC | Age: 77
End: 2022-12-20

## 2023-02-05 ENCOUNTER — HEALTH MAINTENANCE LETTER (OUTPATIENT)
Age: 78
End: 2023-02-05

## 2023-02-07 DIAGNOSIS — R80.9 TYPE 2 DIABETES MELLITUS WITH MICROALBUMINURIA, WITHOUT LONG-TERM CURRENT USE OF INSULIN (H): ICD-10-CM

## 2023-02-07 DIAGNOSIS — E11.29 TYPE 2 DIABETES MELLITUS WITH MICROALBUMINURIA, WITHOUT LONG-TERM CURRENT USE OF INSULIN (H): ICD-10-CM

## 2023-02-07 DIAGNOSIS — N18.31 CHRONIC KIDNEY DISEASE, STAGE 3A (H): ICD-10-CM

## 2023-02-08 RX ORDER — CANAGLIFLOZIN 100 MG/1
TABLET, FILM COATED ORAL
Qty: 90 TABLET | Refills: 0 | Status: SHIPPED | OUTPATIENT
Start: 2023-02-08 | End: 2023-06-22

## 2023-05-06 ENCOUNTER — HOSPITAL ENCOUNTER (EMERGENCY)
Facility: HOSPITAL | Age: 78
Discharge: HOME | End: 2023-05-06
Attending: EMERGENCY MEDICINE | Admitting: EMERGENCY MEDICINE
Payer: MEDICARE

## 2023-05-06 VITALS
WEIGHT: 180 LBS | HEART RATE: 101 BPM | SYSTOLIC BLOOD PRESSURE: 161 MMHG | DIASTOLIC BLOOD PRESSURE: 109 MMHG | HEIGHT: 65 IN | BODY MASS INDEX: 29.99 KG/M2 | RESPIRATION RATE: 16 BRPM | TEMPERATURE: 98.1 F | OXYGEN SATURATION: 98 %

## 2023-05-06 DIAGNOSIS — M62.81 GENERALIZED MUSCLE WEAKNESS: ICD-10-CM

## 2023-05-06 DIAGNOSIS — R20.2 PARESTHESIA OF BOTH FEET: ICD-10-CM

## 2023-05-06 DIAGNOSIS — E86.0 DEHYDRATION: ICD-10-CM

## 2023-05-06 DIAGNOSIS — I10 ESSENTIAL HYPERTENSION: ICD-10-CM

## 2023-05-06 DIAGNOSIS — R20.2 PARESTHESIA OF HAND, BILATERAL: ICD-10-CM

## 2023-05-06 DIAGNOSIS — R73.9 HYPERGLYCEMIA: ICD-10-CM

## 2023-05-06 LAB
ANION GAP SERPL CALCULATED.3IONS-SCNC: 15 MMOL/L (ref 7–15)
BASOPHILS # BLD AUTO: 0 10E3/UL (ref 0–0.2)
BASOPHILS NFR BLD AUTO: 0 %
BUN SERPL-MCNC: 16.1 MG/DL (ref 8–23)
CALCIUM SERPL-MCNC: 10.8 MG/DL (ref 8.8–10.2)
CHLORIDE SERPL-SCNC: 96 MMOL/L (ref 98–107)
CREAT SERPL-MCNC: 1.01 MG/DL (ref 0.67–1.17)
DEPRECATED HCO3 PLAS-SCNC: 25 MMOL/L (ref 22–29)
EOSINOPHIL # BLD AUTO: 0.1 10E3/UL (ref 0–0.7)
EOSINOPHIL NFR BLD AUTO: 2 %
ERYTHROCYTE [DISTWIDTH] IN BLOOD BY AUTOMATED COUNT: 12.5 % (ref 10–15)
GFR SERPL CREATININE-BSD FRML MDRD: 78 ML/MIN/1.73M2
GLUCOSE SERPL-MCNC: 169 MG/DL (ref 70–99)
HCT VFR BLD AUTO: 46.2 % (ref 40–53)
HGB BLD-MCNC: 16.5 G/DL (ref 13.3–17.7)
HOLD SPECIMEN: NORMAL
HOLD SPECIMEN: NORMAL
IMM GRANULOCYTES # BLD: 0 10E3/UL
IMM GRANULOCYTES NFR BLD: 0 %
LYMPHOCYTES # BLD AUTO: 2.4 10E3/UL (ref 0.8–5.3)
LYMPHOCYTES NFR BLD AUTO: 35 %
MAGNESIUM SERPL-MCNC: 1.8 MG/DL (ref 1.7–2.3)
MCH RBC QN AUTO: 30.6 PG (ref 26.5–33)
MCHC RBC AUTO-ENTMCNC: 35.7 G/DL (ref 31.5–36.5)
MCV RBC AUTO: 86 FL (ref 78–100)
MONOCYTES # BLD AUTO: 0.7 10E3/UL (ref 0–1.3)
MONOCYTES NFR BLD AUTO: 9 %
NEUTROPHILS # BLD AUTO: 3.8 10E3/UL (ref 1.6–8.3)
NEUTROPHILS NFR BLD AUTO: 54 %
NRBC # BLD AUTO: 0 10E3/UL
NRBC BLD AUTO-RTO: 0 /100
PLAT MORPH BLD: NORMAL
PLATELET # BLD AUTO: 259 10E3/UL (ref 150–450)
POTASSIUM SERPL-SCNC: 4.4 MMOL/L (ref 3.4–5.3)
RBC # BLD AUTO: 5.39 10E6/UL (ref 4.4–5.9)
RBC MORPH BLD: NORMAL
SODIUM SERPL-SCNC: 136 MMOL/L (ref 136–145)
TSH SERPL DL<=0.005 MIU/L-ACNC: 1.26 UIU/ML (ref 0.3–4.2)
WBC # BLD AUTO: 7 10E3/UL (ref 4–11)

## 2023-05-06 PROCEDURE — 93005 ELECTROCARDIOGRAM TRACING: CPT | Performed by: EMERGENCY MEDICINE

## 2023-05-06 PROCEDURE — 82310 ASSAY OF CALCIUM: CPT | Performed by: EMERGENCY MEDICINE

## 2023-05-06 PROCEDURE — 84443 ASSAY THYROID STIM HORMONE: CPT | Performed by: EMERGENCY MEDICINE

## 2023-05-06 PROCEDURE — 258N000003 HC RX IP 258 OP 636: Performed by: EMERGENCY MEDICINE

## 2023-05-06 PROCEDURE — 99284 EMERGENCY DEPT VISIT MOD MDM: CPT | Mod: 25

## 2023-05-06 PROCEDURE — 82607 VITAMIN B-12: CPT | Performed by: EMERGENCY MEDICINE

## 2023-05-06 PROCEDURE — 83735 ASSAY OF MAGNESIUM: CPT | Performed by: EMERGENCY MEDICINE

## 2023-05-06 PROCEDURE — 85025 COMPLETE CBC W/AUTO DIFF WBC: CPT | Performed by: EMERGENCY MEDICINE

## 2023-05-06 PROCEDURE — 36415 COLL VENOUS BLD VENIPUNCTURE: CPT | Performed by: EMERGENCY MEDICINE

## 2023-05-06 PROCEDURE — 96360 HYDRATION IV INFUSION INIT: CPT

## 2023-05-06 RX ORDER — GABAPENTIN 100 MG/1
CAPSULE ORAL
Qty: 147 CAPSULE | Refills: 0 | Status: ON HOLD | OUTPATIENT
Start: 2023-05-06 | End: 2023-05-16

## 2023-05-06 RX ADMIN — SODIUM CHLORIDE 1000 ML: 9 INJECTION, SOLUTION INTRAVENOUS at 18:59

## 2023-05-06 ASSESSMENT — ACTIVITIES OF DAILY LIVING (ADL): ADLS_ACUITY_SCORE: 35

## 2023-05-06 NOTE — ED TRIAGE NOTES
"Pt c/o his hands and heels feeling like they're \"burning\" for past week.  Pt has history of type 1 diabetes.  Pt also c/o lightheadedness for past week.  Pt denies chest pain and denies shortness of breath.     Triage Assessment     Row Name 05/06/23 5104       Triage Assessment (Adult)    Airway WDL WDL       Respiratory WDL    Respiratory WDL WDL       Skin Circulation/Temperature WDL    Skin Circulation/Temperature WDL WDL       Cardiac WDL    Cardiac WDL WDL       Peripheral/Neurovascular WDL    Peripheral Neurovascular WDL WDL       Cognitive/Neuro/Behavioral WDL    Cognitive/Neuro/Behavioral WDL WDL              "

## 2023-05-06 NOTE — TELEPHONE ENCOUNTER
"Routing refill request to provider for review/approval because:  Labs out of range:  Cr    Last Written Prescription Date:  8/10/22  Last Fill Quantity: 90,  # refills: 2   Last office visit provider:  8/30/22     Requested Prescriptions   Pending Prescriptions Disp Refills     metoprolol succinate ER (TOPROL XL) 50 MG 24 hr tablet [Pharmacy Med Name: METOPROLOL SUCC ER 50 MG TAB] 90 tablet 2     Sig: TAKE 1 TABLET BY MOUTH EVERY DAY       Beta-Blockers Protocol Passed - 5/6/2023  7:21 AM        Passed - Blood pressure under 140/90 in past 12 months     BP Readings from Last 3 Encounters:   08/30/22 129/82   04/25/22 128/88   09/20/21 138/80                 Passed - Patient is age 6 or older        Passed - Recent (12 mo) or future (30 days) visit within the authorizing provider's specialty     Patient has had an office visit with the authorizing provider or a provider within the authorizing providers department within the previous 12 mos or has a future within next 30 days. See \"Patient Info\" tab in inbasket, or \"Choose Columns\" in Meds & Orders section of the refill encounter.              Passed - Medication is active on med list           lisinopril (ZESTRIL) 40 MG tablet [Pharmacy Med Name: LISINOPRIL 40 MG TABLET] 90 tablet 2     Sig: TAKE 1 TABLET BY MOUTH EVERY DAY       ACE Inhibitors (Including Combos) Protocol Failed - 5/6/2023  7:21 AM        Failed - Normal serum creatinine on file in past 12 months     Recent Labs   Lab Test 12/06/22  0951   CR 1.41*       Ok to refill medication if creatinine is low          Passed - Blood pressure under 140/90 in past 12 months     BP Readings from Last 3 Encounters:   08/30/22 129/82   04/25/22 128/88   09/20/21 138/80                 Passed - Recent (12 mo) or future (30 days) visit within the authorizing provider's specialty     Patient has had an office visit with the authorizing provider or a provider within the authorizing providers department within the " "previous 12 mos or has a future within next 30 days. See \"Patient Info\" tab in inbasket, or \"Choose Columns\" in Meds & Orders section of the refill encounter.              Passed - Medication is active on med list        Passed - Patient is age 18 or older        Passed - Normal serum potassium on file in past 12 months     Recent Labs   Lab Test 12/06/22  0951   POTASSIUM 4.9                  Ijeoma Self 05/06/23 8:28 AM  "

## 2023-05-06 NOTE — ED PROVIDER NOTES
"EMERGENCY DEPARTMENT ENCOUNTER      NAME: Vashti King  AGE: 77 year old male  YOB: 1945  MRN: [id number]  EVALUATION DATE & TIME: No admission date for patient encounter.    PCP: Edison Banda    ED PROVIDER: Vee Granados M.D.      Chief Complaint   Patient presents with     Bilateral hands \"burning\"     Bilateral heels \"burning\"         FINAL IMPRESSION:  1. Paresthesia of both feet    2. Paresthesia of hand, bilateral    3. Generalized muscle weakness    4. Dehydration    5. Hyperglycemia        ED COURSE & MEDICAL DECISION MAKING:    Pertinent Labs & Imaging studies reviewed. (See chart for details)    6:43 PM I met with the patient and his son, obtained history, performed an initial exam, and discussed options and plan for diagnostics and treatment here in the ED.   8:05 PM I rechecked and updated the patient on test results. We discussed the plan for discharge and the patient is agreeable. Reviewed supportive cares, symptomatic treatment, outpatient follow up, and reasons to return to the Emergency Department.     ED Course as of 05/06/23 2320   Sat May 06, 2023   1852 Patient is a 77-year-old male comes in today for evaluation of burning to his hands and feet.  Swelling on for couple weeks now.  He is also been complaining of feeling a little bit of weakness.  This is fairly generalized.  He is having some difficulty with ambulation because of it.  He has been having some falling with standing but denies any injuries.  He is mostly not sleeping well because of the burning.  He is not currently on any nerve medications.  He said it started after a snow fall when he was trying to clear the snow but probably did not have enough warmth to his hands and feet.  He denies any history of anything like this in the past.  Is just not getting better but is also not getting worse.  He is a known insulin-dependent diabetic and has blood sugars running in the 200s.  Denies any associated fevers or cough " or nausea or vomiting or chills.  We will check some lab work and get an EKG on him.  We will give him some fluids and if everything comes back okay I think he could go home on some Neurontin.   1954 Patient's work-up here so far has been pretty unremarkable.  His B12 is still in process and I am not sure that will even come back today.  That can be followed by his primary care doctor.  Calcium was slightly elevated at 10.8 and glucose was little elevated at 169.  We will ambulate him and see how he feels after some fluids.  I think if the weakness is doing a little bit better and his heart rate has improved we could consider discharge home with Neurontin.   2005 Check back in on the patient.  He is overall feeling a little better.  He feels comfortable with discharge home.  He does not feel like he is a fall risk at home and very much wants to be discharged.  We will get a start him on Neurontin and he has follow-up with his primary care doctor coming up.       Medical Decision Making    History:    Supplemental history from: Family member    External Record(s) reviewed: None    Work Up:    Emergent/Severe conditions considered and evaluated for: Thyroid disease, severe electrolyte abnormality, anemia, renal failure, arrhythmia    I independently reviewed and interpreted EKG    In additional to work up documented, I considered the following work up: None    Medications given that require intensive monitoring for toxicity: None    External consultation:    Discussion of management with another provider: None    Complicating factors:    Care impacted by chronic illness: Diabetes mellitus    Care affected by social determinants of health: N/A    Disposition considerations: Discharge  Prescriptions considered/prescribed: Gabapentin    At the conclusion of the encounter I discussed  the results of all of the tests and the disposition with patient.   All questions were answered.  The patient acknowledged understanding  and was involved in the decision making regarding the overall care plan.      I discussed with patient the utility, limitations and findings of the exam/interventions/studies done during this visit as well as the list of differential diagnosis and symptoms to monitor/return to ER for.  Additional verbal discharge instructions were provided.     MEDICATIONS GIVEN IN THE EMERGENCY:  Medications   0.9% sodium chloride BOLUS (0 mLs Intravenous Stopped 5/6/23 2009)       NEW PRESCRIPTIONS STARTED AT TODAY'S ER VISIT  Discharge Medication List as of 5/6/2023  8:12 PM      START taking these medications    Details   gabapentin (NEURONTIN) 100 MG capsule Take 1 capsule (100 mg) by mouth 2 times daily for 7 days, THEN 2 capsules (200 mg) 2 times daily for 7 days, THEN 3 capsules (300 mg) 2 times daily for 7 days, THEN 3 capsules (300 mg) 3 times daily for 7 days., Disp-147 capsule, R-0, E-Prescribe                =================================================================    HPI    Triage Note:      Patient information was obtained from: patient and family member    Use of : N/A         Vashti King is a 77 year old male with a history of insulin dependent DM II, CKD stage 3a, HTN, and HLD, who presents via private vehicle with family member for evaluation of hand and foot pain.     Patient reports a 1.5 week history of persistent burning bilateral hand and foot pain, which initially began after shoveling snow without wearing boots or gloves. He has never had this happen before and has not taken any medications for his symptoms. Additionally, patient reports generalized weakness with concerns for falling. Patient regularly checks his BG and today noted it to be elevated to 200. Of note, his son reports the patient has an appointment scheduled with his PCP on 6/1. Patient otherwise denies fever, chills, cough, nausea, vomiting, numbness, headache, or any other symptoms or concerns at this time.        PAST  MEDICAL HISTORY:  Past Medical History:   Diagnosis Date     BPH (benign prostatic hyperplasia)      Diabetes mellitus (H)      Diabetes mellitus, type II (H)      High cholesterol      Hyperlipidemia      Hypertension        PAST SURGICAL HISTORY:  No past surgical history on file.    CURRENT MEDICATIONS:    No current facility-administered medications for this encounter.    Current Outpatient Medications:      gabapentin (NEURONTIN) 100 MG capsule, Take 1 capsule (100 mg) by mouth 2 times daily for 7 days, THEN 2 capsules (200 mg) 2 times daily for 7 days, THEN 3 capsules (300 mg) 2 times daily for 7 days, THEN 3 capsules (300 mg) 3 times daily for 7 days., Disp: 147 capsule, Rfl: 0     aspirin (ASA) 81 MG EC tablet, Take 1 tablet (81 mg) by mouth daily, Disp: 90 tablet, Rfl: 3     atorvastatin (LIPITOR) 40 MG tablet, TAKE 1 TABLET BY MOUTH EVERY DAY, Disp: 90 tablet, Rfl: 2     blood glucose (CONTOUR TEST) test strip, Use to test blood sugar once daily or as directed., Disp: 100 strip, Rfl: 3     diltiazem ER COATED BEADS (CARDIZEM CD/CARTIA XT) 240 MG 24 hr capsule, TAKE 1 CAPSULE BY MOUTH EVERY DAY, Disp: 90 capsule, Rfl: 1     generic lancets (FINGERSTIX LANCETS), [GENERIC LANCETS (FINGERSTIX LANCETS)] Check blood sugar twice daily.  Uses Insulin.  Diagnosis E11.9, Disp: 100 each, Rfl: 1     glipiZIDE (GLUCOTROL) 5 MG tablet, TAKE 2 TABLETS BY MOUTH TWICE A DAY WITH MEALS, Disp: 360 tablet, Rfl: 1     hydrochlorothiazide (HYDRODIURIL) 12.5 MG tablet, Take 1 tablet (12.5 mg) by mouth daily, Disp: 90 tablet, Rfl: 3     INVOKANA 100 MG tablet, TAKE 1 TABLET (100 MG) BY MOUTH EVERY MORNING (BEFORE BREAKFAST), Disp: 90 tablet, Rfl: 0     lisinopril (ZESTRIL) 40 MG tablet, Take 1 tablet (40 mg) by mouth daily, Disp: 90 tablet, Rfl: 2     loratadine (CLARITIN) 10 MG tablet, TAKE 1 TABLET BY MOUTH EVERY DAY - **OTC ITEM - DRUG NOT COVERED--USE DISCOUNT CARD**, Disp: 90 tablet, Rfl: 0     metFORMIN (GLUCOPHAGE XR) 500  "MG 24 hr tablet, TAKE 2 TABLETS BY MOUTH TWICE A DAY WITH FOOD, Disp: 360 tablet, Rfl: 3     metoprolol succinate ER (TOPROL XL) 50 MG 24 hr tablet, Take 1 tablet (50 mg) by mouth daily, Disp: 90 tablet, Rfl: 2     Microlet Lancets MISC, 100 each daily Use to test blood sugar once daily., Disp: 100 each, Rfl: 4     pen needle, diabetic 32 gauge x 1/4\" Ndle, [PEN NEEDLE, DIABETIC 32 GAUGE X 1/4\" NDLE] Use as needed with insulin, Disp: 100 each, Rfl: 1     tamsulosin (FLOMAX) 0.4 MG capsule, Take 1 capsule (0.4 mg) by mouth daily (with dinner), Disp: 90 capsule, Rfl: 1    ALLERGIES:  Allergies   Allergen Reactions     Amlodipine Swelling     Swelling in legs        FAMILY HISTORY:  Family History   Problem Relation Age of Onset     Diabetes Sister      Hypertension Sister      Diabetes Brother      Hypertension Brother      Cerebrovascular Disease Brother        SOCIAL HISTORY:   Social History     Socioeconomic History     Marital status:    Tobacco Use     Smoking status: Never     Smokeless tobacco: Never   Substance and Sexual Activity     Drug use: No       PHYSICAL EXAM    VITAL SIGNS: BP (!) 161/109   Pulse 101   Temp 98.1  F (36.7  C) (Oral)   Resp 16   Ht 1.651 m (5' 5\")   Wt 81.6 kg (180 lb)   SpO2 98%   BMI 29.95 kg/m     GENERAL: Awake, alert, answering questions appropriately, appears comfortable and in no acute distress.  SPEECH:  Easy to understand speech, Normal volume and marta  PULMONARY: No respiratory distress, Lungs clear to auscultation bilaterally  CARDIOVASCULAR: Regular rate and rhythm, Distal pulses present and normal.  ABDOMINAL: Soft, Nondistended, Nontender, No rebound or guarding, No palpable masses  EXTREMITIES: No lower extremity edema.  NEURO: No sensory deficits.   PSYCH: Normal mood and affect     LAB:  All pertinent labs reviewed and interpreted.  Results for orders placed or performed during the hospital encounter of 05/06/23   Basic metabolic panel   Result Value " Ref Range    Sodium 136 136 - 145 mmol/L    Potassium 4.4 3.4 - 5.3 mmol/L    Chloride 96 (L) 98 - 107 mmol/L    Carbon Dioxide (CO2) 25 22 - 29 mmol/L    Anion Gap 15 7 - 15 mmol/L    Urea Nitrogen 16.1 8.0 - 23.0 mg/dL    Creatinine 1.01 0.67 - 1.17 mg/dL    Calcium 10.8 (H) 8.8 - 10.2 mg/dL    Glucose 169 (H) 70 - 99 mg/dL    GFR Estimate 78 >60 mL/min/1.73m2   Result Value Ref Range    Magnesium 1.8 1.7 - 2.3 mg/dL   TSH with free T4 reflex   Result Value Ref Range    TSH 1.26 0.30 - 4.20 uIU/mL   CBC with platelets and differential   Result Value Ref Range    WBC Count 7.0 4.0 - 11.0 10e3/uL    RBC Count 5.39 4.40 - 5.90 10e6/uL    Hemoglobin 16.5 13.3 - 17.7 g/dL    Hematocrit 46.2 40.0 - 53.0 %    MCV 86 78 - 100 fL    MCH 30.6 26.5 - 33.0 pg    MCHC 35.7 31.5 - 36.5 g/dL    RDW 12.5 10.0 - 15.0 %    Platelet Count 259 150 - 450 10e3/uL    % Neutrophils 54 %    % Lymphocytes 35 %    % Monocytes 9 %    % Eosinophils 2 %    % Basophils 0 %    % Immature Granulocytes 0 %    NRBCs per 100 WBC 0 <1 /100    Absolute Neutrophils 3.8 1.6 - 8.3 10e3/uL    Absolute Lymphocytes 2.4 0.8 - 5.3 10e3/uL    Absolute Monocytes 0.7 0.0 - 1.3 10e3/uL    Absolute Eosinophils 0.1 0.0 - 0.7 10e3/uL    Absolute Basophils 0.0 0.0 - 0.2 10e3/uL    Absolute Immature Granulocytes 0.0 <=0.4 10e3/uL    Absolute NRBCs 0.0 10e3/uL   Extra Blue Top Tube   Result Value Ref Range    Hold Specimen JIC    Extra Red Top Tube   Result Value Ref Range    Hold Specimen JIC    RBC and Platelet Morphology   Result Value Ref Range    Platelet Assessment  Automated Count Confirmed. Platelet morphology is normal.     Automated Count Confirmed. Platelet morphology is normal.    RBC Morphology Confirmed RBC Indices        EKG:    Date and time: May 6, 2023 at 1903  Rate: 95 bpm  Rhythm: Sinus rhythm with first-degree AV block  AR interval: 210 ms  QRS interval: 74 ms  QT/QTc: 372/467 ms  ST changes or T wave changes: No acute ST or T wave  abnormalities  Change from prior ECG: No significant change from prior  I have independently reviewed and interpreted this EKG.       I, Camilla Walker, am serving as a scribe to document services personally performed by Dr. Granados based on my observation and the provider's statements to me. I, Vee Granados MD attest that Camilla Walker is acting in a scribe capacity, has observed my performance of the services and has documented them in accordance with my direction.    Vee Granados M.D.  Emergency Medicine  [facility] EMERGENCY DEPARTMENT  [address] [place] [id number] [phone]  Dept: [phone]      Vee Granados MD  05/06/23 6690

## 2023-05-07 ENCOUNTER — HOSPITAL ENCOUNTER (EMERGENCY)
Facility: HOSPITAL | Age: 78
Discharge: HOME | End: 2023-05-07
Attending: EMERGENCY MEDICINE | Admitting: EMERGENCY MEDICINE
Payer: MEDICARE

## 2023-05-07 VITALS
DIASTOLIC BLOOD PRESSURE: 115 MMHG | HEART RATE: 99 BPM | SYSTOLIC BLOOD PRESSURE: 176 MMHG | TEMPERATURE: 97.7 F | RESPIRATION RATE: 17 BRPM | OXYGEN SATURATION: 98 %

## 2023-05-07 DIAGNOSIS — G62.9 PERIPHERAL POLYNEUROPATHY: ICD-10-CM

## 2023-05-07 DIAGNOSIS — M79.642 PAIN OF LEFT HAND: ICD-10-CM

## 2023-05-07 DIAGNOSIS — M79.641 PAIN IN RIGHT HAND: ICD-10-CM

## 2023-05-07 LAB — VIT B12 SERPL-MCNC: 1335 PG/ML (ref 232–1245)

## 2023-05-07 PROCEDURE — 99283 EMERGENCY DEPT VISIT LOW MDM: CPT

## 2023-05-07 PROCEDURE — 250N000013 HC RX MED GY IP 250 OP 250 PS 637

## 2023-05-07 RX ORDER — GABAPENTIN 100 MG/1
100 CAPSULE ORAL ONCE
Status: COMPLETED | OUTPATIENT
Start: 2023-05-07 | End: 2023-05-07

## 2023-05-07 RX ADMIN — GABAPENTIN 100 MG: 100 CAPSULE ORAL at 10:34

## 2023-05-07 ASSESSMENT — ENCOUNTER SYMPTOMS
FEVER: 0
SHORTNESS OF BREATH: 0
WEAKNESS: 0
CHILLS: 0

## 2023-05-07 NOTE — ED PROVIDER NOTES
I, Rachell Parker have reviewed the documentation, personally taken the patient's history, performed an exam and agree with the physical finds, diagnosis and management plan.    HPI:    Patient reports that he had onset of bilateral hand and foot pain (which is burning in quality) about 1.5 weeks ago after shoveling snow and not wearing gloves. He states that he was seen yesterday and prescribed gabapentin, which he could not fill due to pharmacy issues. He reports that he is having continued pain.    Physical Exam:    Well appearing, in no apparent distress.   Has glove-like complaints of burning paraesthesias from the wrist distally bilaterally, which is not worse in the medial or ulnar nerve distributions. No erythema, rash, swelling, or warmth. Good capillary refill. Intrinsic hand movements intact.    MDM: Symptoms and exam are consistent with a peripheral neuropathy.  He does not have more focal pain over the median nerve distribution to suspect a carpal tunnel syndrome.  Nor conversely more pain over the ulnar nerve distribution to suspect a cubital tunnel syndrome.    ED Course/workup:   10:05 AM America Elmroe PA-C staffed the patient with me.  10:08 AM I introduced myself to the patient. I gathered additional history and preformed my exam.         Final Diagnosis:     ICD-10-CM    1. Pain of left hand  M79.642       2. Pain in right hand  M79.641       3. Peripheral polyneuropathy  G62.9           I, Elise Gryler , am serving as a scribe to document services personally performed by Rachell Parker MD, based on my observations and the provider's statements to me.  I, Rachell Parker MD, attest that Elise Gryler is acting in a scribe capacity, has observed my performance of the services and has documented them in accordance with my direction.      I personally saw the patient and performed a substantive portion of the visit including all aspects of the medical decision making.     Rachell Parker MD          Rachell Parker MD  05/07/23 7534

## 2023-05-07 NOTE — ED PROVIDER NOTES
EMERGENCY DEPARTMENT ENCOUNTER      NAME: Vashti King  AGE: 77 year old male  YOB: 1945  MRN: [id number]  EVALUATION DATE & TIME: No admission date for patient encounter.    PCP: Edison Banda    ED PROVIDER: America Elmore PA-C      Chief Complaint   Patient presents with     Hand Pain     FINAL IMPRESSION:  1. Pain of left hand    2. Pain in right hand    3. Peripheral polyneuropathy        ED COURSE & MEDICAL DECISION MAKING:    Pertinent Labs & Imaging studies reviewed. (See chart for details)  77 year old male presents to the Emergency Department for evaluation of hand pain.  A week and a half ago patient started to have bilateral burning hand pain after shoveling snow.  He has never had symptoms like this in the past.  Patient has diabetes and has frequently elevated sugars in the 200s.  Patient was seen in the ED yesterday.  Labs were unremarkable.  Patient improved after fluids.  Patient was prescribed gabapentin and discharged home.  Unfortunately patient was unable to get the gabapentin prescription due to the pharmacies being closed.  Patient return to the ED today with continued right hand pain.  Vital signs reviewed and patient is hypertensive which is baseline for the patient.  Afebrile.  On exam, bilateral hands are nontender to palpation.  Normal range of motion, sensation and strength in bilateral hands.  Burning sensation in bilateral hands have glove distribution.  Burning is across both radial and ulnar nerve distributions.  No erythema, edema, ecchymosis.  No lacerations or abrasions.  No warmth.  No lesions.  Negative Phalen's test.    Low suspicion for carpal tunnel due to distribution of burning sensation.  Low suspicion for shingles due to bilateral burning hand pain. No signs of infection.  Patient was given a dose of gabapentin here in the ED.  Patient was advised to fill his gabapentin prescription and to follow-up with his primary care doctor.  Patient has an  appointment scheduled for 6/1.  Patient will return to the ED if new symptoms develop or symptoms worsen.  Patient agrees with plan.  All questions answered.      ED COURSE:   10:08 AM  I saw the patient. Staffed with Dr. Parker.   10:24 AM Patient will be discharged home. Given a dose of gabapentin in the ED. All questions answered. Patient agrees with the plan.        At the conclusion of the encounter I discussed the results of all of the tests and the disposition. The questions were answered. The patient or family acknowledged understanding and was agreeable with the care plan.     0 minutes of critical care time       Additional Documentation    History:    Supplemental history from: Documented in chart, if applicable    External Record(s) reviewed: Documented in chart, if applicable.    Work Up:    Chart documentation includes differential considered and any EKGs or imaging interpreted by provider.    In additional to work up documented, I considered the following work up: Documented in chart, if applicable.    External consultation:    Discussion of management with another provider: Documented in chart, if applicable    Complicating factors:    Care impacted by chronic illness: Diabetes    Care affected by social determinants of health: N/A    Disposition considerations: Discharge. No recommendations on prescription strength medication(s). See documentation for any additional details.      MEDICATIONS GIVEN IN THE EMERGENCY:  Medications   gabapentin (NEURONTIN) capsule 100 mg (has no administration in time range)       NEW PRESCRIPTIONS STARTED AT TODAY'S ER VISIT  New Prescriptions    No medications on file       =================================================================    HPI    Patient information was obtained from: Patient    Use of : N/A      Vashti King is a 77 year old male with a pertinent history of DM II, HTN, hyperlipidemia, BPH, CKD who presents to this ED for evaluation of  burning hand pain.    A week and a half ago patient started to develop bilateral burning hand and foot pain which began after shoveling snow without wearing appropriate attire.  He has never had anything like this happen in the past.  No medications for his symptoms.  Patient's blood sugars have been elevated in the 200s recently. Patient has an appointment with his PCP on 6/1.     Per chart review patient was seen in the ED yesterday for burning in his hands and feet.  Labs are unremarkable.  Calcium was slightly elevated at 10.8.  Improved after fluids.  Patient was prescribed gabapentin and discharged home. After being discharged, patient was unable to get his gabapentin prescription. Patient return to the ED, with continued burning hand pain. No new symptoms. Today, blood sugar was 150.    He denies rash, fever, chills, weakness, chest pain, shortness of breath.     REVIEW OF SYSTEMS   Review of Systems   Constitutional: Negative for chills and fever.   Respiratory: Negative for shortness of breath.    Cardiovascular: Negative for chest pain.   Skin: Negative for rash.   Neurological: Negative for weakness.        Bilateral hand burning.    All other systems reviewed and are negative.    PAST MEDICAL HISTORY:  Past Medical History:   Diagnosis Date     BPH (benign prostatic hyperplasia)      Diabetes mellitus (H)      Diabetes mellitus, type II (H)      High cholesterol      Hyperlipidemia      Hypertension        PAST SURGICAL HISTORY:  No past surgical history on file.        CURRENT MEDICATIONS:    aspirin (ASA) 81 MG EC tablet  atorvastatin (LIPITOR) 40 MG tablet  blood glucose (CONTOUR TEST) test strip  diltiazem ER COATED BEADS (CARDIZEM CD/CARTIA XT) 240 MG 24 hr capsule  gabapentin (NEURONTIN) 100 MG capsule  generic lancets (FINGERSTIX LANCETS)  glipiZIDE (GLUCOTROL) 5 MG tablet  hydrochlorothiazide (HYDRODIURIL) 12.5 MG tablet  INVOKANA 100 MG tablet  lisinopril (ZESTRIL) 40 MG tablet  loratadine  "(CLARITIN) 10 MG tablet  metFORMIN (GLUCOPHAGE XR) 500 MG 24 hr tablet  metoprolol succinate ER (TOPROL XL) 50 MG 24 hr tablet  Microlet Lancets MISC  pen needle, diabetic 32 gauge x 1/4\" Ndle  tamsulosin (FLOMAX) 0.4 MG capsule        ALLERGIES:  Allergies   Allergen Reactions     Amlodipine Swelling     Swelling in legs        FAMILY HISTORY:  Family History   Problem Relation Age of Onset     Diabetes Sister      Hypertension Sister      Diabetes Brother      Hypertension Brother      Cerebrovascular Disease Brother        SOCIAL HISTORY:   Social History     Socioeconomic History     Marital status:    Tobacco Use     Smoking status: Never     Smokeless tobacco: Never   Substance and Sexual Activity     Drug use: No       VITALS:  BP (!) 176/115   Pulse 99   Temp 97.7  F (36.5  C) (Oral)   Resp 17   SpO2 98%     PHYSICAL EXAM    Physical Exam  Vitals and nursing note reviewed.   Constitutional:       General: He is not in acute distress.     Appearance: Normal appearance. He is not ill-appearing.   HENT:      Head: Atraumatic.      Right Ear: External ear normal.      Left Ear: External ear normal.      Nose: Nose normal.      Mouth/Throat:      Mouth: Mucous membranes are moist.   Eyes:      Conjunctiva/sclera: Conjunctivae normal.      Pupils: Pupils are equal, round, and reactive to light.   Cardiovascular:      Rate and Rhythm: Normal rate and regular rhythm.      Pulses: Normal pulses.      Heart sounds: Normal heart sounds. No murmur heard.     No friction rub. No gallop.   Pulmonary:      Effort: Pulmonary effort is normal.      Breath sounds: Normal breath sounds. No wheezing or rales.   Abdominal:      Tenderness: There is no abdominal tenderness. There is no guarding or rebound.   Musculoskeletal:      Cervical back: Normal range of motion.      Comments: Bilateral hands are non tender to palpation. Normal ROM, sensation and strength of bilateral hands. Patient reports burning sensation in " bilateral hands. Burning is across both radial and ulnar nerves. Burning in hand have a glove distribution. No edema, edema, ecchymosis.  No warmth.  Negative Phalen's test   Skin:     General: Skin is dry.      Capillary Refill: Capillary refill takes less than 2 seconds.      Findings: No lesion or rash.   Neurological:      General: No focal deficit present.      Mental Status: He is alert and oriented to person, place, and time. Mental status is at baseline.   Psychiatric:         Mood and Affect: Mood normal.         Thought Content: Thought content normal.        America Elmore PA-C  [facility] EMERGENCY DEPARTMENT  [facility] [facility] [id number]  [phone]     America Elmore PA-C  05/07/23 1043

## 2023-05-07 NOTE — ED TRIAGE NOTES
Pt presents to triage ambulatory for hand pain. Pt reports he was seen yesterday for the same thing. Pt reports burning sensation in hands and feet worse at night. Reports yesterday after receiving fluids he felt better. Pt is type 2 diabetic. Pt also reports decrease appetite.

## 2023-05-07 NOTE — DISCHARGE INSTRUCTIONS
You were seen in the Emergency Department today for evaluation of burning to your hands and feet and generalized weakness.  Your lab work showed elevated blood sugar.  You were prescribed gabapentin. You should take all medications as prescribed.  Follow up with your primary care physician to ensure resolution of symptoms. Return if you have new or worsening symptoms.

## 2023-05-07 NOTE — DISCHARGE INSTRUCTIONS
Rest. Drink water. Ice your hands. Take Gabapentin that was prescribed to you yesterday. Gabapentin takes several weeks to start working. Follow up with your primary care provider to discuss your ED visit. Return to the ED if new symptoms develop or symptoms worsen.

## 2023-05-08 LAB
ATRIAL RATE - MUSE: 95 BPM
DIASTOLIC BLOOD PRESSURE - MUSE: NORMAL MMHG
INTERPRETATION ECG - MUSE: NORMAL
P AXIS - MUSE: 36 DEGREES
PR INTERVAL - MUSE: 210 MS
QRS DURATION - MUSE: 74 MS
QT - MUSE: 372 MS
QTC - MUSE: 467 MS
R AXIS - MUSE: -66 DEGREES
SYSTOLIC BLOOD PRESSURE - MUSE: NORMAL MMHG
T AXIS - MUSE: 30 DEGREES
VENTRICULAR RATE- MUSE: 95 BPM

## 2023-05-09 ENCOUNTER — HOSPITAL ENCOUNTER (INPATIENT)
Facility: HOSPITAL | Age: 78
LOS: 7 days | Discharge: NURSING, REHAB OR LONG-TERM CARE | DRG: 644 | End: 2023-05-16
Attending: EMERGENCY MEDICINE | Admitting: STUDENT IN AN ORGANIZED HEALTH CARE EDUCATION/TRAINING PROGRAM
Payer: MEDICARE

## 2023-05-09 DIAGNOSIS — G62.9 POLYNEUROPATHY: ICD-10-CM

## 2023-05-09 DIAGNOSIS — E87.1 HYPONATREMIA: ICD-10-CM

## 2023-05-09 DIAGNOSIS — E11.9 DIABETES MELLITUS, TYPE 2 (H): ICD-10-CM

## 2023-05-09 DIAGNOSIS — E87.6 HYPOKALEMIA: ICD-10-CM

## 2023-05-09 DIAGNOSIS — K59.03 THERAPEUTIC OPIOID INDUCED CONSTIPATION: ICD-10-CM

## 2023-05-09 DIAGNOSIS — T40.2X5A THERAPEUTIC OPIOID INDUCED CONSTIPATION: ICD-10-CM

## 2023-05-09 DIAGNOSIS — I10 ESSENTIAL HYPERTENSION: Primary | ICD-10-CM

## 2023-05-09 DIAGNOSIS — M54.50 ACUTE MIDLINE LOW BACK PAIN WITHOUT SCIATICA: ICD-10-CM

## 2023-05-09 DIAGNOSIS — F51.01 PRIMARY INSOMNIA: ICD-10-CM

## 2023-05-09 DIAGNOSIS — R11.10 VOMITING: ICD-10-CM

## 2023-05-09 LAB
ALBUMIN SERPL BCG-MCNC: 4.5 G/DL (ref 3.5–5.2)
ALBUMIN UR-MCNC: 20 MG/DL
ALP SERPL-CCNC: 61 U/L (ref 40–129)
ALT SERPL W P-5'-P-CCNC: 21 U/L (ref 10–50)
ANION GAP SERPL CALCULATED.3IONS-SCNC: 16 MMOL/L (ref 7–15)
ANION GAP SERPL CALCULATED.3IONS-SCNC: 18 MMOL/L (ref 7–15)
ANION GAP SERPL CALCULATED.3IONS-SCNC: 19 MMOL/L (ref 7–15)
APPEARANCE UR: CLEAR
AST SERPL W P-5'-P-CCNC: 29 U/L (ref 10–50)
B-OH-BUTYR SERPL-SCNC: 2.1 MMOL/L
BASE EXCESS BLDV CALC-SCNC: -1.1 MMOL/L
BILIRUB DIRECT SERPL-MCNC: 0.36 MG/DL (ref 0–0.3)
BILIRUB SERPL-MCNC: 1.6 MG/DL
BILIRUB UR QL STRIP: NEGATIVE
BUN SERPL-MCNC: 15.5 MG/DL (ref 8–23)
BUN SERPL-MCNC: 16.3 MG/DL (ref 8–23)
BUN SERPL-MCNC: 16.5 MG/DL (ref 8–23)
CALCIUM SERPL-MCNC: 8.6 MG/DL (ref 8.8–10.2)
CALCIUM SERPL-MCNC: 8.9 MG/DL (ref 8.8–10.2)
CALCIUM SERPL-MCNC: 9.3 MG/DL (ref 8.8–10.2)
CHLORIDE SERPL-SCNC: 79 MMOL/L (ref 98–107)
CHLORIDE SERPL-SCNC: 80 MMOL/L (ref 98–107)
CHLORIDE SERPL-SCNC: 81 MMOL/L (ref 98–107)
CK SERPL-CCNC: 301 U/L (ref 39–308)
COLOR UR AUTO: ABNORMAL
CREAT SERPL-MCNC: 0.78 MG/DL (ref 0.67–1.17)
CREAT SERPL-MCNC: 0.81 MG/DL (ref 0.67–1.17)
CREAT SERPL-MCNC: 0.82 MG/DL (ref 0.67–1.17)
DEPRECATED HCO3 PLAS-SCNC: 18 MMOL/L (ref 22–29)
DEPRECATED HCO3 PLAS-SCNC: 20 MMOL/L (ref 22–29)
DEPRECATED HCO3 PLAS-SCNC: 21 MMOL/L (ref 22–29)
ERYTHROCYTE [DISTWIDTH] IN BLOOD BY AUTOMATED COUNT: 11.8 % (ref 10–15)
GFR SERPL CREATININE-BSD FRML MDRD: >90 ML/MIN/1.73M2
GLUCOSE BLDC GLUCOMTR-MCNC: 137 MG/DL (ref 70–99)
GLUCOSE BLDC GLUCOMTR-MCNC: 138 MG/DL (ref 70–99)
GLUCOSE BLDC GLUCOMTR-MCNC: 177 MG/DL (ref 70–99)
GLUCOSE SERPL-MCNC: 147 MG/DL (ref 70–99)
GLUCOSE SERPL-MCNC: 167 MG/DL (ref 70–99)
GLUCOSE SERPL-MCNC: 176 MG/DL (ref 70–99)
GLUCOSE UR STRIP-MCNC: >1000 MG/DL
HBA1C MFR BLD: 7.9 %
HCO3 BLDV-SCNC: 24 MMOL/L (ref 24–30)
HCT VFR BLD AUTO: 45.2 % (ref 40–53)
HGB BLD-MCNC: 16.8 G/DL (ref 13.3–17.7)
HGB UR QL STRIP: NEGATIVE
KETONES UR STRIP-MCNC: 100 MG/DL
LEUKOCYTE ESTERASE UR QL STRIP: NEGATIVE
LIPASE SERPL-CCNC: 30 U/L (ref 13–60)
MCH RBC QN AUTO: 30.7 PG (ref 26.5–33)
MCHC RBC AUTO-ENTMCNC: 37.2 G/DL (ref 31.5–36.5)
MCV RBC AUTO: 83 FL (ref 78–100)
MUCOUS THREADS #/AREA URNS LPF: PRESENT /LPF
NITRATE UR QL: NEGATIVE
OSMOLALITY SERPL: 262 MMOL/KG (ref 280–301)
OSMOLALITY UR: 822 MMOL/KG (ref 100–1200)
OXYHGB MFR BLDV: 53.7 % (ref 70–75)
PCO2 BLDV: 43 MM HG (ref 35–50)
PH BLDV: 7.36 [PH] (ref 7.35–7.45)
PH UR STRIP: 5 [PH] (ref 5–7)
PLATELET # BLD AUTO: 273 10E3/UL (ref 150–450)
PO2 BLDV: 30 MM HG (ref 25–47)
POTASSIUM SERPL-SCNC: 3.7 MMOL/L (ref 3.4–5.3)
POTASSIUM SERPL-SCNC: 4 MMOL/L (ref 3.4–5.3)
POTASSIUM SERPL-SCNC: 4 MMOL/L (ref 3.4–5.3)
PROT SERPL-MCNC: 8.1 G/DL (ref 6.4–8.3)
RBC # BLD AUTO: 5.48 10E6/UL (ref 4.4–5.9)
RBC URINE: 0 /HPF
SAO2 % BLDV: 54.6 % (ref 70–75)
SODIUM SERPL-SCNC: 116 MMOL/L (ref 136–145)
SODIUM SERPL-SCNC: 116 MMOL/L (ref 136–145)
SODIUM SERPL-SCNC: 120 MMOL/L (ref 136–145)
SODIUM UR-SCNC: 43 MMOL/L
SP GR UR STRIP: 1.03 (ref 1–1.03)
UROBILINOGEN UR STRIP-MCNC: <2 MG/DL
WBC # BLD AUTO: 7.6 10E3/UL (ref 4–11)
WBC URINE: 1 /HPF

## 2023-05-09 PROCEDURE — 250N000013 HC RX MED GY IP 250 OP 250 PS 637: Performed by: EMERGENCY MEDICINE

## 2023-05-09 PROCEDURE — 80048 BASIC METABOLIC PNL TOTAL CA: CPT | Performed by: STUDENT IN AN ORGANIZED HEALTH CARE EDUCATION/TRAINING PROGRAM

## 2023-05-09 PROCEDURE — 99223 1ST HOSP IP/OBS HIGH 75: CPT | Mod: AI | Performed by: STUDENT IN AN ORGANIZED HEALTH CARE EDUCATION/TRAINING PROGRAM

## 2023-05-09 PROCEDURE — 82248 BILIRUBIN DIRECT: CPT | Performed by: EMERGENCY MEDICINE

## 2023-05-09 PROCEDURE — 36415 COLL VENOUS BLD VENIPUNCTURE: CPT | Performed by: EMERGENCY MEDICINE

## 2023-05-09 PROCEDURE — 84300 ASSAY OF URINE SODIUM: CPT | Performed by: STUDENT IN AN ORGANIZED HEALTH CARE EDUCATION/TRAINING PROGRAM

## 2023-05-09 PROCEDURE — 82805 BLOOD GASES W/O2 SATURATION: CPT | Performed by: EMERGENCY MEDICINE

## 2023-05-09 PROCEDURE — 258N000003 HC RX IP 258 OP 636: Performed by: EMERGENCY MEDICINE

## 2023-05-09 PROCEDURE — 83930 ASSAY OF BLOOD OSMOLALITY: CPT | Performed by: STUDENT IN AN ORGANIZED HEALTH CARE EDUCATION/TRAINING PROGRAM

## 2023-05-09 PROCEDURE — 96360 HYDRATION IV INFUSION INIT: CPT

## 2023-05-09 PROCEDURE — 120N000001 HC R&B MED SURG/OB

## 2023-05-09 PROCEDURE — 250N000011 HC RX IP 250 OP 636: Performed by: PHYSICIAN ASSISTANT

## 2023-05-09 PROCEDURE — 82550 ASSAY OF CK (CPK): CPT | Performed by: EMERGENCY MEDICINE

## 2023-05-09 PROCEDURE — 99291 CRITICAL CARE FIRST HOUR: CPT | Mod: 25

## 2023-05-09 PROCEDURE — 250N000012 HC RX MED GY IP 250 OP 636 PS 637: Performed by: STUDENT IN AN ORGANIZED HEALTH CARE EDUCATION/TRAINING PROGRAM

## 2023-05-09 PROCEDURE — 81001 URINALYSIS AUTO W/SCOPE: CPT | Performed by: EMERGENCY MEDICINE

## 2023-05-09 PROCEDURE — 85027 COMPLETE CBC AUTOMATED: CPT | Performed by: EMERGENCY MEDICINE

## 2023-05-09 PROCEDURE — 80053 COMPREHEN METABOLIC PANEL: CPT | Performed by: EMERGENCY MEDICINE

## 2023-05-09 PROCEDURE — 83036 HEMOGLOBIN GLYCOSYLATED A1C: CPT | Performed by: STUDENT IN AN ORGANIZED HEALTH CARE EDUCATION/TRAINING PROGRAM

## 2023-05-09 PROCEDURE — 250N000011 HC RX IP 250 OP 636: Performed by: EMERGENCY MEDICINE

## 2023-05-09 PROCEDURE — 258N000003 HC RX IP 258 OP 636: Performed by: STUDENT IN AN ORGANIZED HEALTH CARE EDUCATION/TRAINING PROGRAM

## 2023-05-09 PROCEDURE — 36415 COLL VENOUS BLD VENIPUNCTURE: CPT | Performed by: STUDENT IN AN ORGANIZED HEALTH CARE EDUCATION/TRAINING PROGRAM

## 2023-05-09 PROCEDURE — 99222 1ST HOSP IP/OBS MODERATE 55: CPT | Performed by: PHYSICIAN ASSISTANT

## 2023-05-09 PROCEDURE — 83935 ASSAY OF URINE OSMOLALITY: CPT | Performed by: STUDENT IN AN ORGANIZED HEALTH CARE EDUCATION/TRAINING PROGRAM

## 2023-05-09 PROCEDURE — 82962 GLUCOSE BLOOD TEST: CPT

## 2023-05-09 PROCEDURE — 250N000013 HC RX MED GY IP 250 OP 250 PS 637: Performed by: STUDENT IN AN ORGANIZED HEALTH CARE EDUCATION/TRAINING PROGRAM

## 2023-05-09 PROCEDURE — 82010 KETONE BODYS QUAN: CPT | Performed by: EMERGENCY MEDICINE

## 2023-05-09 PROCEDURE — 93005 ELECTROCARDIOGRAM TRACING: CPT | Performed by: EMERGENCY MEDICINE

## 2023-05-09 PROCEDURE — 83690 ASSAY OF LIPASE: CPT | Performed by: EMERGENCY MEDICINE

## 2023-05-09 RX ORDER — ASPIRIN 81 MG/1
81 TABLET ORAL DAILY
Status: DISCONTINUED | OUTPATIENT
Start: 2023-05-09 | End: 2023-05-16 | Stop reason: HOSPADM

## 2023-05-09 RX ORDER — NICOTINE POLACRILEX 4 MG
15-30 LOZENGE BUCCAL
Status: DISCONTINUED | OUTPATIENT
Start: 2023-05-09 | End: 2023-05-16 | Stop reason: HOSPADM

## 2023-05-09 RX ORDER — GABAPENTIN 100 MG/1
100 CAPSULE ORAL ONCE
Status: COMPLETED | OUTPATIENT
Start: 2023-05-09 | End: 2023-05-09

## 2023-05-09 RX ORDER — ACETAMINOPHEN 325 MG/1
650 TABLET ORAL ONCE
Status: COMPLETED | OUTPATIENT
Start: 2023-05-09 | End: 2023-05-09

## 2023-05-09 RX ORDER — ACETAMINOPHEN 325 MG/1
650 TABLET ORAL EVERY 6 HOURS PRN
Status: DISCONTINUED | OUTPATIENT
Start: 2023-05-09 | End: 2023-05-14

## 2023-05-09 RX ORDER — IBUPROFEN 600 MG/1
600 TABLET, FILM COATED ORAL ONCE
Status: COMPLETED | OUTPATIENT
Start: 2023-05-09 | End: 2023-05-09

## 2023-05-09 RX ORDER — ONDANSETRON 4 MG/1
4 TABLET, ORALLY DISINTEGRATING ORAL EVERY 6 HOURS PRN
Status: DISCONTINUED | OUTPATIENT
Start: 2023-05-09 | End: 2023-05-16 | Stop reason: HOSPADM

## 2023-05-09 RX ORDER — ACETAMINOPHEN 650 MG/1
650 SUPPOSITORY RECTAL EVERY 6 HOURS PRN
Status: DISCONTINUED | OUTPATIENT
Start: 2023-05-09 | End: 2023-05-14

## 2023-05-09 RX ORDER — LIDOCAINE 40 MG/G
CREAM TOPICAL
Status: DISCONTINUED | OUTPATIENT
Start: 2023-05-09 | End: 2023-05-16 | Stop reason: HOSPADM

## 2023-05-09 RX ORDER — SODIUM CHLORIDE 9 MG/ML
INJECTION, SOLUTION INTRAVENOUS CONTINUOUS
Status: DISCONTINUED | OUTPATIENT
Start: 2023-05-09 | End: 2023-05-09

## 2023-05-09 RX ORDER — LISINOPRIL 20 MG/1
40 TABLET ORAL DAILY
Status: DISCONTINUED | OUTPATIENT
Start: 2023-05-09 | End: 2023-05-16 | Stop reason: HOSPADM

## 2023-05-09 RX ORDER — METOPROLOL SUCCINATE 50 MG/1
50 TABLET, EXTENDED RELEASE ORAL DAILY
Status: DISCONTINUED | OUTPATIENT
Start: 2023-05-09 | End: 2023-05-09

## 2023-05-09 RX ORDER — DEXTROSE MONOHYDRATE 25 G/50ML
25-50 INJECTION, SOLUTION INTRAVENOUS
Status: DISCONTINUED | OUTPATIENT
Start: 2023-05-09 | End: 2023-05-16 | Stop reason: HOSPADM

## 2023-05-09 RX ORDER — METOPROLOL SUCCINATE 50 MG/1
100 TABLET, EXTENDED RELEASE ORAL DAILY
Status: DISCONTINUED | OUTPATIENT
Start: 2023-05-10 | End: 2023-05-11

## 2023-05-09 RX ORDER — ONDANSETRON 4 MG/1
4 TABLET, ORALLY DISINTEGRATING ORAL ONCE
Status: COMPLETED | OUTPATIENT
Start: 2023-05-09 | End: 2023-05-09

## 2023-05-09 RX ORDER — ONDANSETRON 2 MG/ML
4 INJECTION INTRAMUSCULAR; INTRAVENOUS EVERY 6 HOURS PRN
Status: DISCONTINUED | OUTPATIENT
Start: 2023-05-09 | End: 2023-05-16 | Stop reason: HOSPADM

## 2023-05-09 RX ORDER — DILTIAZEM HYDROCHLORIDE 120 MG/1
240 CAPSULE, COATED, EXTENDED RELEASE ORAL DAILY
Status: DISCONTINUED | OUTPATIENT
Start: 2023-05-09 | End: 2023-05-16 | Stop reason: HOSPADM

## 2023-05-09 RX ORDER — SODIUM CHLORIDE 9 MG/ML
INJECTION, SOLUTION INTRAVENOUS ONCE
Status: COMPLETED | OUTPATIENT
Start: 2023-05-09 | End: 2023-05-09

## 2023-05-09 RX ORDER — FUROSEMIDE 10 MG/ML
40 INJECTION INTRAMUSCULAR; INTRAVENOUS ONCE
Status: COMPLETED | OUTPATIENT
Start: 2023-05-09 | End: 2023-05-09

## 2023-05-09 RX ORDER — GABAPENTIN 100 MG/1
100 CAPSULE ORAL 2 TIMES DAILY
Status: DISCONTINUED | OUTPATIENT
Start: 2023-05-09 | End: 2023-05-10

## 2023-05-09 RX ORDER — ATORVASTATIN CALCIUM 40 MG/1
40 TABLET, FILM COATED ORAL DAILY
Status: DISCONTINUED | OUTPATIENT
Start: 2023-05-09 | End: 2023-05-16 | Stop reason: HOSPADM

## 2023-05-09 RX ADMIN — ATORVASTATIN CALCIUM 40 MG: 40 TABLET, FILM COATED ORAL at 13:33

## 2023-05-09 RX ADMIN — LISINOPRIL 40 MG: 20 TABLET ORAL at 13:33

## 2023-05-09 RX ADMIN — ASPIRIN 81 MG: 81 TABLET, COATED ORAL at 13:33

## 2023-05-09 RX ADMIN — DILTIAZEM HYDROCHLORIDE 240 MG: 120 CAPSULE, COATED, EXTENDED RELEASE ORAL at 13:33

## 2023-05-09 RX ADMIN — SODIUM CHLORIDE: 9 INJECTION, SOLUTION INTRAVENOUS at 12:36

## 2023-05-09 RX ADMIN — INSULIN GLARGINE 10 UNITS: 100 INJECTION, SOLUTION SUBCUTANEOUS at 22:10

## 2023-05-09 RX ADMIN — IBUPROFEN 600 MG: 600 TABLET, FILM COATED ORAL at 09:52

## 2023-05-09 RX ADMIN — METOPROLOL SUCCINATE 50 MG: 50 TABLET, EXTENDED RELEASE ORAL at 13:33

## 2023-05-09 RX ADMIN — GABAPENTIN 100 MG: 100 CAPSULE ORAL at 08:41

## 2023-05-09 RX ADMIN — ACETAMINOPHEN 650 MG: 325 TABLET ORAL at 09:52

## 2023-05-09 RX ADMIN — GABAPENTIN 100 MG: 100 CAPSULE ORAL at 22:09

## 2023-05-09 RX ADMIN — SODIUM CHLORIDE: 9 INJECTION, SOLUTION INTRAVENOUS at 09:43

## 2023-05-09 RX ADMIN — ONDANSETRON 4 MG: 4 TABLET, ORALLY DISINTEGRATING ORAL at 08:41

## 2023-05-09 RX ADMIN — FUROSEMIDE 40 MG: 10 INJECTION, SOLUTION INTRAVENOUS at 17:07

## 2023-05-09 ASSESSMENT — ACTIVITIES OF DAILY LIVING (ADL)
ADLS_ACUITY_SCORE: 35
ADLS_ACUITY_SCORE: 35
ADLS_ACUITY_SCORE: 37
ADLS_ACUITY_SCORE: 35
ADLS_ACUITY_SCORE: 37
ADLS_ACUITY_SCORE: 35
ADLS_ACUITY_SCORE: 35
ADLS_ACUITY_SCORE: 33

## 2023-05-09 NOTE — PHARMACY-ADMISSION MEDICATION HISTORY
Pharmacist Admission Medication History    Admission medication history is complete. The information provided in this note is only as accurate as the sources available at the time of the update.    Medication reconciliation/reorder completed by provider prior to medication history? No    Information Source(s): Patient, Family member and CareEverywhere/SureScripts via in-person    Pertinent Information: Patient has a prescription for tamsulosin 0.4 mg daily for BPH from 2021, no recent fills in last year so removed from list.     Changes made to PTA medication list:    Added: None    Deleted: loratadine, tamsulosin    Changed: None    Medication Affordability:  Not including over the counter (OTC) medications, was there a time in the past 12 months when you did not take your medications as prescribed because of cost?: No    Allergies reviewed with patient and updates made in EHR: yes    Medication History Completed By: Mega Yeung [facility] 5/9/2023 10:07 AM    Prior to Admission medications    Medication Sig Last Dose Taking? Auth Provider Long Term End Date   aspirin (ASA) 81 MG EC tablet Take 1 tablet (81 mg) by mouth daily 5/8/2023 at AM Yes Grover Madrigal MD     atorvastatin (LIPITOR) 40 MG tablet TAKE 1 TABLET BY MOUTH EVERY DAY 5/8/2023 at AM Yes Grover Madrigal MD Yes    diltiazem ER COATED BEADS (CARDIZEM CD/CARTIA XT) 240 MG 24 hr capsule TAKE 1 CAPSULE BY MOUTH EVERY DAY 5/8/2023 Yes Edison Banda MD Yes    gabapentin (NEURONTIN) 100 MG capsule Take 1 capsule (100 mg) by mouth 2 times daily for 7 days, THEN 2 capsules (200 mg) 2 times daily for 7 days, THEN 3 capsules (300 mg) 2 times daily for 7 days, THEN 3 capsules (300 mg) 3 times daily for 7 days. 5/9/2023 at AM in ED, 100 mg BID Yes Vee Granados MD Yes 6/3/23   glipiZIDE (GLUCOTROL) 5 MG tablet TAKE 2 TABLETS BY MOUTH TWICE A DAY WITH MEALS 5/8/2023 at PM Yes Edison Banda MD Yes    hydrochlorothiazide (HYDRODIURIL) 12.5 MG tablet  "Take 1 tablet (12.5 mg) by mouth daily 5/8/2023 at AM Yes Edison Banda MD Yes    INVOKANA 100 MG tablet TAKE 1 TABLET (100 MG) BY MOUTH EVERY MORNING (BEFORE BREAKFAST) 5/8/2023 Yes Edison Banda MD     lisinopril (ZESTRIL) 40 MG tablet Take 1 tablet (40 mg) by mouth daily 5/8/2023 at AM Yes Edison Banda MD Yes    metFORMIN (GLUCOPHAGE XR) 500 MG 24 hr tablet TAKE 2 TABLETS BY MOUTH TWICE A DAY WITH FOOD 5/8/2023 at PM Yes Grover Madrigal MD Yes    metoprolol succinate ER (TOPROL XL) 50 MG 24 hr tablet Take 1 tablet (50 mg) by mouth daily 5/8/2023 at AM Yes Edison Banda MD Yes    blood glucose (CONTOUR TEST) test strip Use to test blood sugar once daily or as directed.   Edison Banda MD     generic lancets (FINGERSTIX LANCETS) [GENERIC LANCETS (FINGERSTIX LANCETS)] Check blood sugar twice daily.  Uses Insulin.  Diagnosis E11.9   Grover Madrigal MD     Microlet Lancets MISC 100 each daily Use to test blood sugar once daily.   Edison Banda MD     pen needle, diabetic 32 gauge x 1/4\" Ndle [PEN NEEDLE, DIABETIC 32 GAUGE X 1/4\" NDLE] Use as needed with insulin   Grover Madrigal MD         "

## 2023-05-09 NOTE — CONSULTS
RENAL CONSULT NOTE    REQUESTING PHYSICIAN: Dr. Torres    REASON FOR CONSULT: Hyponatremia     ASSESSMENT/PLAN:    Acute hyponatremia - Baseline normonatremic, including at ED visit 5/6/2023 when Na+ was 136. Acutely down to 120 in the setting of uncontrolled pain, nausea, and hydrochlorothiazide use. Likely a component of excess ADH at play exacerbated by thiazide use, supported by Osm studies (urine Osm 822, serum Osm 262, Urine Na 43 in the setting of thiazide diuretic). TSH WNL 5/6/2023. Some elevated serum ketones but does not appear to be in DKA and BG's <200; Na+ correction for hyperglycemia is negligible.     Recs:   - On IV NS at 75 ml/hour, continue for now  - Will gently diurese, IV Lasix 40 mg x 1 for now    - BMP ordered Q4 hours by hospital service   - Mercedes use of anti-emetics and pain control but avoid further NSAID use as these can worsen hyponatremia   - Goal correction 6-8 mmol/L over 24 hours     Polyneuropathy - Long-standing history of DM in stocking/glove distribution makes diabetic neuropathy likely. Ok to continue gabapentin from nephrology standpoint. Pain control will be important in regulating excess ADH.     Hypertension - BP elevated here. PTA hydrochlorothiazide on hold with hyponatremia. Increase metoprolol XL to 100 mg daily, maintain lisinopril 40 mg, diltiazem  mg.      Type 2 diabetes - Elevated ketones, BG's here <200. Na+ correction for hyperglycemia is negligible. Management per primary service.     CKD 3a - Baseline Cr 1.1-1.4, eGFR 55-60+. Currently Cr improved from baseline. He does not follow with nephrology regularly and could be seen in our clinic after discharge if he desired.     Hyperlipidemia - on statin     HPI: Vashti King is a 76 yo M with PMH significant for hypertension, CKD 3a, type 2 diabetes,  who presented to [facility] ED for the third time this week with complaint of polyneuropathy of his hands and feet; he was started on gabapentin 5/7/2023  and since that time developed nausea and vomiting. Na+ was noted to be down acutely to 120 (from 136 on 5/6/2023) and nephrology was consulted. Patient evaluated in the ED with son at bedside and professional [facility]  via phone. He is somewhat of a difficult historian and some difficulty getting a straight answer out of him. He reports feeling a little better than when he came in. Tells me that he had significant nausea and two episodes of vomiting PTA; one last night after dinner and this morning. The pain and weakness in his arms and legs is his chief complaint; says gabapentin helped with this a little but he has not been able to find a comfortable position for sleeping recently due to the pain. Not more confused and mental status is at baseline per his son. He denies any dizziness, lightheadedness, shortness of breath, or edema. He feels he has been urinating normally. He has been taking his blood pressure medications regularly including his hydrochlorothiazide.     REVIEW OF SYSTEMS:  Comprehensive ROS negative except per HPI     ALLERGIES/SENSITIVITIES:  Allergies   Allergen Reactions     Amlodipine Swelling     Swelling in legs      Social History     Tobacco Use     Smoking status: Never     Smokeless tobacco: Never   Substance Use Topics     Drug use: No     I have reviewed this patient's family history and updated it with pertinent information if needed.  Family History   Problem Relation Age of Onset     Diabetes Sister      Hypertension Sister      Diabetes Brother      Hypertension Brother      Cerebrovascular Disease Brother          PHYSICAL EXAM:  Physical Exam   Temp: 97.7  F (36.5  C) Temp src: Oral BP: (!) 158/90 Pulse: 96   Resp: 12 SpO2: 97 %      Vitals:    05/09/23 0752   Weight: 81.6 kg (180 lb)     Vital Signs with Ranges  Temp:  [96.5  F (35.8  C)-97.7  F (36.5  C)] 97.7  F (36.5  C)  Pulse:  [96] 96  Resp:  [12] 12  BP: (158-159)/(90-95) 158/90  SpO2:  [97 %-98 %] 97 %  No  intake/output data recorded.    @TMAXR(24)@    Patient Vitals for the past 72 hrs:   Weight   05/09/23 0752 81.6 kg (180 lb)       General - A & O x 3, NAD  HEENT - PERRLA, no scleral icterus  Neck - no carotid bruits, no JVD  Respiratory - Lungs CTA bilat without wheeze, rhonchi, rales  Cardiovascular - AP RRR with murmur  Abdomen - soft, BS present, no bruits, no fluid wave  Extremities - well perfused, no edema  Integumentary - intact, good turgor, no rash/lesions  Neurologic - grossly intact  Psych:  Judgement intact, affect WNL    Laboratory:     Recent Labs   Lab 05/09/23  0847 05/06/23  1856   WBC 7.6 7.0   RBC 5.48 5.39   HGB 16.8 16.5   HCT 45.2 46.2    259       Basic Metabolic Panel:  Recent Labs   Lab 05/09/23  1241 05/09/23  0847 05/06/23  1856   NA  --  120* 136   POTASSIUM  --  4.0 4.4   CHLORIDE  --  81* 96*   CO2  --  20* 25   BUN  --  15.5 16.1   CR  --  0.78 1.01   * 176* 169*   HELEN  --  9.3 10.8*       INRNo lab results found in last 7 days.    Recent Labs   Lab Test 05/09/23  0847 05/06/23  1856   POTASSIUM 4.0 4.4   CHLORIDE 81* 96*   BUN 15.5 16.1      Recent Labs   Lab Test 05/09/23  1155 05/09/23  0847 12/06/22  0951 08/30/22  1010   ALBUMIN  --  4.5 4.5  --    BILITOTAL  --  1.6* 0.7  --    ALT  --  21 23  --    AST  --  29 39  --    PROTEIN 20*  --   --  Negative       Personally reviewed today's laboratory studies      Thank you for involving us in the care of this patient. We will continue to follow along with you.    Deysi Michelle PA-C   [facility]  [phone]

## 2023-05-09 NOTE — ED NOTES
Provider notified of patient low sodium level as well as patient flagging sepsis protocol. Sepsis work up deferred per provider. IV fluids ordered.

## 2023-05-09 NOTE — H&P
[facility]    History and Physical - Hospitalist Service       Date of Admission:  5/9/2023    Assessment & Plan      Vashti King is a 77 year old male admitted on 5/9/2023. He has a history of hypertension, diabetes, BPH, CKD presented with nausea and vomiting.  Patient was seen a couple days ago in the ED for burning sensation of the hands and feet and he was started on gabapentin on 5/7/2023.  He stated that he started to have this nausea vomiting after starting the medication.  Per the son at the bedside, patient does not have mental status changes or seizure activity.  Otherwise she denies abdominal pain, urinary or bowel habit changes, fever or chills, no cough shortness of breath, chest pain, or bilateral leg swelling. In the ED, sodium was 120 down from 136 three days prior.    Acute hyponatremia  -In the setting of HCTZ use and nausea and vomiting  -Urine sodium equals 43, urine osmolality equals 822, serum osmolality was 262  -Continue with normal saline for now.  Status post Lasix per nephrology  -BMP every 4 hours     Abnormal LFTs  -Repeat CMP in a.m.  -No abdominal pain    H/o CKD stage IIIa  - cr at admission 0.78 (baseline 1.1-1.4)  -Monitor BMP, I/O    Metabolic acidosis  -pH >7.3, ketosis with anion gap likely from starvation ketosis    Hyperlipidemia  -Continue with PTA statin    Essential hypertension  -HCTZ held due to hyponatremia  -Continue with lisinopril, diltiazem, and increase metoprolol to 100    Polyneuropathy-likely secondary to diabetes mellitus  -Continue with gabapentin    Diabetes mellitus II  -Accu-Cheks, sliding scale, lantus  -Hemoglobin A1c 7.9         Diet: Combination Diet Moderate Consistent Carb (60 g CHO per Meal) Diet    DVT Prophylaxis: Pneumatic Compression Devices  [place] Catheter: Not present  Lines: None     Cardiac Monitoring: None  Code Status: Full Code      Clinically Significant Risk Factors Present on Admission         # Hyponatremia:  "Lowest Na = 120 mmol/L in last 2 days, will monitor as appropriate     # Anion Gap Metabolic Acidosis: Highest Anion Gap = 19 mmol/L in last 2 days, will monitor and treat as appropriate      # Hypertension: home medication list includes antihypertensive(s)     # DMII: A1C = N/A within past 6 months    # Overweight: Estimated body mass index is 29.95 kg/m  as calculated from the following:    Height as of this encounter: 1.651 m (5' 5\").    Weight as of this encounter: 81.6 kg (180 lb).           Disposition Plan      Expected Discharge Date: 05/11/2023                  Aminah Torres MD  Hospitalist Service  [facility]  Securely message with CRMnext (more info)  Text page via [facility] Paging/Directory     ______________________________________________________________________    Chief Complaint   Nausea, vomiting    History is obtained from the patient, son    History of Present Illness   Vashti King is a 77 year old male who has a history of hypertension, diabetes, BPH, CKD presented with nausea and vomiting.  Patient was seen a couple days ago in the ED for burning sensation of the hands and feet and he was started on gabapentin on 5/7/2023.  He stated that he started to have this nausea vomiting after starting the medication.  Per the son at the bedside, patient does not have mental status changes or seizure activity.  Otherwise she denies abdominal pain, urinary or bowel habit changes, fever or chills, no cough shortness of breath, chest pain, or bilateral leg swelling.      Past Medical History    Past Medical History:   Diagnosis Date     BPH (benign prostatic hyperplasia)      Diabetes mellitus (H)      Diabetes mellitus, type II (H)      High cholesterol      Hyperlipidemia      Hypertension        Past Surgical History   No past surgical history on file.    Prior to Admission Medications   Prior to Admission Medications   Prescriptions Last Dose Informant Patient Reported? Taking? " "  INVOKANA 100 MG tablet 2023  No Yes   Sig: TAKE 1 TABLET (100 MG) BY MOUTH EVERY MORNING (BEFORE BREAKFAST)   Microlet Lancets MISC   No No   Si each daily Use to test blood sugar once daily.   aspirin (ASA) 81 MG EC tablet 2023 at AM  No Yes   Sig: Take 1 tablet (81 mg) by mouth daily   atorvastatin (LIPITOR) 40 MG tablet 2023 at AM  No Yes   Sig: TAKE 1 TABLET BY MOUTH EVERY DAY   blood glucose (CONTOUR TEST) test strip   No No   Sig: Use to test blood sugar once daily or as directed.   diltiazem ER COATED BEADS (CARDIZEM CD/CARTIA XT) 240 MG 24 hr capsule 2023  No Yes   Sig: TAKE 1 CAPSULE BY MOUTH EVERY DAY   gabapentin (NEURONTIN) 100 MG capsule 2023 at AM in ED, 100 mg BID  No Yes   Sig: Take 1 capsule (100 mg) by mouth 2 times daily for 7 days, THEN 2 capsules (200 mg) 2 times daily for 7 days, THEN 3 capsules (300 mg) 2 times daily for 7 days, THEN 3 capsules (300 mg) 3 times daily for 7 days.   generic lancets (FINGERSTIX LANCETS)   No No   Sig: [GENERIC LANCETS (FINGERSTIX LANCETS)] Check blood sugar twice daily.  Uses Insulin.  Diagnosis E11.9   glipiZIDE (GLUCOTROL) 5 MG tablet 2023 at PM  No Yes   Sig: TAKE 2 TABLETS BY MOUTH TWICE A DAY WITH MEALS   hydrochlorothiazide (HYDRODIURIL) 12.5 MG tablet 2023 at AM  No Yes   Sig: Take 1 tablet (12.5 mg) by mouth daily   lisinopril (ZESTRIL) 40 MG tablet 2023 at AM  No Yes   Sig: Take 1 tablet (40 mg) by mouth daily   metFORMIN (GLUCOPHAGE XR) 500 MG 24 hr tablet 2023 at PM  No Yes   Sig: TAKE 2 TABLETS BY MOUTH TWICE A DAY WITH FOOD   metoprolol succinate ER (TOPROL XL) 50 MG 24 hr tablet 2023 at AM  No Yes   Sig: Take 1 tablet (50 mg) by mouth daily   pen needle, diabetic 32 gauge x 1/4\" Ndle   No No   Sig: [PEN NEEDLE, DIABETIC 32 GAUGE X 1/4\" NDLE] Use as needed with insulin      Facility-Administered Medications: None        Review of Systems    The 10 point Review of Systems is negative other than noted in " the HPI or here.      Physical Exam   Vital Signs: Temp: 97.7  F (36.5  C) Temp src: Oral BP: (!) 158/90 Pulse: 96   Resp: 12 SpO2: 97 %      Weight: 180 lbs 0 oz      GEN: Alert and oriented. Not in acute distress.  HEENT: Atraumatic, mucous membrane- moist and pink.  Chest: Bilateral air entry.  CVS: S1S2 regular. No murmurs, rubs or gallops.  Abdomen: Soft. Non-tender, non-distended. No organomegaly. No guarding or rigidity. Bowel sounds active.   Extremities: No pedal edema.  CNS: No focal neurologic deficit. No involuntary movements.  Skin: No skin rash, no cyanosis or clubbing.     Medical Decision Making             Data     I have personally reviewed the following data over the past 24 hrs:    7.6  \   16.8   / 273     120 (L) 81 (L) 15.5 /  138 (H)   4.0 20 (L) 0.78 \       ALT: 21 AST: 29 AP: 61 TBILI: 1.6 (H)   ALB: 4.5 TOT PROTEIN: 8.1 LIPASE: 30       TSH: N/A T4: N/A A1C: 7.9 (H)

## 2023-05-09 NOTE — ED TRIAGE NOTES
Pt is here with son today as he has had vomiting x2-last at 9am yesterday am.  Also, pt started gabapentin bid for bilateral hand and foot burning and the meds have not been helping. bs this am 180.

## 2023-05-09 NOTE — ED PROVIDER NOTES
"Emergency Department Midlevel Supervisory Note     I personally saw the patient and performed a substantive portion of the visit including all aspects of the medical decision making.    ED Course:  9:51 AM Lisa Nice PA-C staffed patient with me. I agree with their assessment and plan of management, and I will see the patient.  9:53 AM I met with the patient to introduce myself, gather additional history, perform my initial exam, and discuss the plan.     Brief HPI:     Vashti King is a 77 year old male who presents for evaluation of vomiting.     The patient endorses nausea and vomiting since beginning gabapentin after ED visits on 5/5 and 5/6. He has only been able to keep water down recently. The patient is compliant with medications. He endorses some subjective fevers and paraesthesias of his hands and feet. He has had difficulty secondary to his symptoms this week. The patient denies abdominal pain, diarrhea, and any other symptoms at this time.     I, Teetee Nickerson, am serving as a scribe to document services personally performed by Lisandro Monaco MD, based on my observation and the provider's statements to me. I, Lisandro Monaco MD, attest that Teetee Nickerson is acting in a scribe capacity, has observed my performance of the services and has documented them in accordance with my direction.    Brief Physical Exam: BP (!) 158/90   Pulse 96   Temp 97.7  F (36.5  C) (Oral)   Resp 12   Ht 1.651 m (5' 5\")   Wt 81.6 kg (180 lb)   SpO2 97%   BMI 29.95 kg/m    Constitutional:  Alert, in no acute distress  EYES: Conjunctivae clear  HENT:  Atraumatic, normocephalic  Respiratory:  Respirations even, unlabored, in no acute respiratory distress  Cardiovascular:  Regular rate and rhythm, good peripheral perfusion  GI: Soft, nondistended, nontender, no palpable masses, no rebound, no guarding   Musculoskeletal:  No edema. No cyanosis. Range of motion major extremities intact.    Integument: Warm, Dry, No erythema, " No rash.   Neurologic:  Alert & oriented, no focal deficits noted  Psych: Normal mood and affect     MDM:  Patient with low sodium.  Supplementation started.  Patient will be admitted to [facility] telemetry.  Patient in agreement with the plan.         1. Hyponatremia    2. Polyneuropathy    3. Diabetes mellitus, type 2 (H)    4. Vomiting        Labs and Imaging:  Results for orders placed or performed during the hospital encounter of 05/09/23   CBC (+ platelets, no diff)   Result Value Ref Range    WBC Count 7.6 4.0 - 11.0 10e3/uL    RBC Count 5.48 4.40 - 5.90 10e6/uL    Hemoglobin 16.8 13.3 - 17.7 g/dL    Hematocrit 45.2 40.0 - 53.0 %    MCV 83 78 - 100 fL    MCH 30.7 26.5 - 33.0 pg    MCHC 37.2 (H) 31.5 - 36.5 g/dL    RDW 11.8 10.0 - 15.0 %    Platelet Count 273 150 - 450 10e3/uL   Basic metabolic panel   Result Value Ref Range    Sodium 120 (L) 136 - 145 mmol/L    Potassium 4.0 3.4 - 5.3 mmol/L    Chloride 81 (L) 98 - 107 mmol/L    Carbon Dioxide (CO2) 20 (L) 22 - 29 mmol/L    Anion Gap 19 (H) 7 - 15 mmol/L    Urea Nitrogen 15.5 8.0 - 23.0 mg/dL    Creatinine 0.78 0.67 - 1.17 mg/dL    Calcium 9.3 8.8 - 10.2 mg/dL    Glucose 176 (H) 70 - 99 mg/dL    GFR Estimate >90 >60 mL/min/1.73m2   Result Value Ref Range    Lipase 30 13 - 60 U/L   Hepatic function panel   Result Value Ref Range    Protein Total 8.1 6.4 - 8.3 g/dL    Albumin 4.5 3.5 - 5.2 g/dL    Bilirubin Total 1.6 (H) <=1.2 mg/dL    Alkaline Phosphatase 61 40 - 129 U/L    AST 29 10 - 50 U/L    ALT 21 10 - 50 U/L    Bilirubin Direct 0.36 (H) 0.00 - 0.30 mg/dL   Result Value Ref Range     39 - 308 U/L     I have reviewed the relevant laboratory and radiology studies    Procedures:      Lisandro Monaco MD  [facility] EMERGENCY DEPARTMENT  [address] [place] [id number]  [phone]     Lisandro Monaco MD  05/09/23 0936

## 2023-05-09 NOTE — PLAN OF CARE
"  Problem: Plan of Care - These are the overarching goals to be used throughout the patient stay.    Goal: Plan of Care Review  Description: The Plan of Care Review/Shift note should be completed every shift.  The Outcome Evaluation is a brief statement about your assessment that the patient is improving, declining, or no change.  This information will be displayed automatically on your shift note.  Outcome: Progressing  Flowsheets (Taken 5/9/2023 1423)  Plan of Care Reviewed With: patient  Goal: Patient-Specific Goal (Individualized)  Description: You can add care plan individualizations to a care plan. Examples of Individualization might be:  \"Parent requests to be called daily at 9am for status\", \"I have a hard time hearing out of my right ear\", or \"Do not touch me to wake me up as it startles me\".  Outcome: Progressing  Goal: Absence of Hospital-Acquired Illness or Injury  Outcome: Progressing  Goal: Optimal Comfort and Wellbeing  Outcome: Progressing  Goal: Readiness for Transition of Care  Outcome: Progressing     Problem: Risk for Delirium  Goal: Optimal Coping  Outcome: Progressing  Goal: Improved Behavioral Control  Outcome: Progressing  Goal: Improved Attention and Thought Clarity  Outcome: Progressing  Goal: Improved Sleep  Outcome: Progressing   Goal Outcome Evaluation:      Plan of Care Reviewed With: patient           Pt is alert and oriented x4. Pt is med complaint. Pt denies pain. No nausea or vomiting. Pt's v/s is stable. Continue to monitor pt.      "

## 2023-05-09 NOTE — ED NOTES
Bed: [place] [address]  Expected date:   Expected time:   Means of arrival:   Comments:  Room 15

## 2023-05-10 LAB
ALBUMIN SERPL BCG-MCNC: 4.2 G/DL (ref 3.5–5.2)
ALP SERPL-CCNC: 56 U/L (ref 40–129)
ALT SERPL W P-5'-P-CCNC: 19 U/L (ref 10–50)
ANION GAP SERPL CALCULATED.3IONS-SCNC: 13 MMOL/L (ref 7–15)
ANION GAP SERPL CALCULATED.3IONS-SCNC: 14 MMOL/L (ref 7–15)
ANION GAP SERPL CALCULATED.3IONS-SCNC: 14 MMOL/L (ref 7–15)
ANION GAP SERPL CALCULATED.3IONS-SCNC: 15 MMOL/L (ref 7–15)
ANION GAP SERPL CALCULATED.3IONS-SCNC: 16 MMOL/L (ref 7–15)
ANION GAP SERPL CALCULATED.3IONS-SCNC: 19 MMOL/L (ref 7–15)
AST SERPL W P-5'-P-CCNC: 38 U/L (ref 10–50)
BASOPHILS # BLD AUTO: 0 10E3/UL (ref 0–0.2)
BASOPHILS NFR BLD AUTO: 0 %
BILIRUB DIRECT SERPL-MCNC: 0.31 MG/DL (ref 0–0.3)
BILIRUB SERPL-MCNC: 1.5 MG/DL
BUN SERPL-MCNC: 17.1 MG/DL (ref 8–23)
BUN SERPL-MCNC: 17.9 MG/DL (ref 8–23)
BUN SERPL-MCNC: 18.3 MG/DL (ref 8–23)
BUN SERPL-MCNC: 18.3 MG/DL (ref 8–23)
BUN SERPL-MCNC: 18.4 MG/DL (ref 8–23)
BUN SERPL-MCNC: 18.6 MG/DL (ref 8–23)
CALCIUM SERPL-MCNC: 8.5 MG/DL (ref 8.8–10.2)
CALCIUM SERPL-MCNC: 8.5 MG/DL (ref 8.8–10.2)
CALCIUM SERPL-MCNC: 8.6 MG/DL (ref 8.8–10.2)
CALCIUM SERPL-MCNC: 8.7 MG/DL (ref 8.8–10.2)
CALCIUM SERPL-MCNC: 8.8 MG/DL (ref 8.8–10.2)
CALCIUM SERPL-MCNC: 8.9 MG/DL (ref 8.8–10.2)
CHLORIDE SERPL-SCNC: 78 MMOL/L (ref 98–107)
CHLORIDE SERPL-SCNC: 78 MMOL/L (ref 98–107)
CHLORIDE SERPL-SCNC: 79 MMOL/L (ref 98–107)
CHLORIDE SERPL-SCNC: 80 MMOL/L (ref 98–107)
CREAT SERPL-MCNC: 0.66 MG/DL (ref 0.67–1.17)
CREAT SERPL-MCNC: 0.68 MG/DL (ref 0.67–1.17)
CREAT SERPL-MCNC: 0.68 MG/DL (ref 0.67–1.17)
CREAT SERPL-MCNC: 0.71 MG/DL (ref 0.67–1.17)
CREAT SERPL-MCNC: 0.77 MG/DL (ref 0.67–1.17)
CREAT SERPL-MCNC: 0.81 MG/DL (ref 0.67–1.17)
DEPRECATED HCO3 PLAS-SCNC: 18 MMOL/L (ref 22–29)
DEPRECATED HCO3 PLAS-SCNC: 19 MMOL/L (ref 22–29)
DEPRECATED HCO3 PLAS-SCNC: 19 MMOL/L (ref 22–29)
DEPRECATED HCO3 PLAS-SCNC: 20 MMOL/L (ref 22–29)
DEPRECATED HCO3 PLAS-SCNC: 21 MMOL/L (ref 22–29)
DEPRECATED HCO3 PLAS-SCNC: 22 MMOL/L (ref 22–29)
EOSINOPHIL # BLD AUTO: 0.1 10E3/UL (ref 0–0.7)
EOSINOPHIL NFR BLD AUTO: 1 %
ERYTHROCYTE [DISTWIDTH] IN BLOOD BY AUTOMATED COUNT: 11.4 % (ref 10–15)
GFR SERPL CREATININE-BSD FRML MDRD: >90 ML/MIN/1.73M2
GLUCOSE BLDC GLUCOMTR-MCNC: 159 MG/DL (ref 70–99)
GLUCOSE BLDC GLUCOMTR-MCNC: 176 MG/DL (ref 70–99)
GLUCOSE BLDC GLUCOMTR-MCNC: 209 MG/DL (ref 70–99)
GLUCOSE BLDC GLUCOMTR-MCNC: 222 MG/DL (ref 70–99)
GLUCOSE SERPL-MCNC: 134 MG/DL (ref 70–99)
GLUCOSE SERPL-MCNC: 135 MG/DL (ref 70–99)
GLUCOSE SERPL-MCNC: 146 MG/DL (ref 70–99)
GLUCOSE SERPL-MCNC: 166 MG/DL (ref 70–99)
GLUCOSE SERPL-MCNC: 170 MG/DL (ref 70–99)
GLUCOSE SERPL-MCNC: 214 MG/DL (ref 70–99)
HCT VFR BLD AUTO: 41.5 % (ref 40–53)
HGB BLD-MCNC: 15.3 G/DL (ref 13.3–17.7)
HOLD SPECIMEN: NORMAL
IMM GRANULOCYTES # BLD: 0 10E3/UL
IMM GRANULOCYTES NFR BLD: 0 %
LYMPHOCYTES # BLD AUTO: 1.6 10E3/UL (ref 0.8–5.3)
LYMPHOCYTES NFR BLD AUTO: 20 %
MCH RBC QN AUTO: 29.9 PG (ref 26.5–33)
MCHC RBC AUTO-ENTMCNC: 36.9 G/DL (ref 31.5–36.5)
MCV RBC AUTO: 81 FL (ref 78–100)
MONOCYTES # BLD AUTO: 1 10E3/UL (ref 0–1.3)
MONOCYTES NFR BLD AUTO: 13 %
NEUTROPHILS # BLD AUTO: 5.3 10E3/UL (ref 1.6–8.3)
NEUTROPHILS NFR BLD AUTO: 66 %
NRBC # BLD AUTO: 0 10E3/UL
NRBC BLD AUTO-RTO: 0 /100
PLATELET # BLD AUTO: 253 10E3/UL (ref 150–450)
POTASSIUM SERPL-SCNC: 3.6 MMOL/L (ref 3.4–5.3)
POTASSIUM SERPL-SCNC: 3.7 MMOL/L (ref 3.4–5.3)
POTASSIUM SERPL-SCNC: 3.9 MMOL/L (ref 3.4–5.3)
POTASSIUM SERPL-SCNC: 3.9 MMOL/L (ref 3.4–5.3)
POTASSIUM SERPL-SCNC: 4 MMOL/L (ref 3.4–5.3)
POTASSIUM SERPL-SCNC: 4.2 MMOL/L (ref 3.4–5.3)
PROT SERPL-MCNC: 7.3 G/DL (ref 6.4–8.3)
RBC # BLD AUTO: 5.11 10E6/UL (ref 4.4–5.9)
SODIUM SERPL-SCNC: 111 MMOL/L (ref 136–145)
SODIUM SERPL-SCNC: 112 MMOL/L (ref 136–145)
SODIUM SERPL-SCNC: 112 MMOL/L (ref 136–145)
SODIUM SERPL-SCNC: 113 MMOL/L (ref 136–145)
SODIUM SERPL-SCNC: 113 MMOL/L (ref 136–145)
SODIUM SERPL-SCNC: 116 MMOL/L (ref 136–145)
SODIUM SERPL-SCNC: 117 MMOL/L (ref 136–145)
SODIUM SERPL-SCNC: 118 MMOL/L (ref 136–145)
WBC # BLD AUTO: 8.1 10E3/UL (ref 4–11)

## 2023-05-10 PROCEDURE — 82248 BILIRUBIN DIRECT: CPT | Performed by: STUDENT IN AN ORGANIZED HEALTH CARE EDUCATION/TRAINING PROGRAM

## 2023-05-10 PROCEDURE — 250N000012 HC RX MED GY IP 250 OP 636 PS 637: Performed by: STUDENT IN AN ORGANIZED HEALTH CARE EDUCATION/TRAINING PROGRAM

## 2023-05-10 PROCEDURE — 272N000451 HC KIT SHRLOCK 5FR POWER PICC DOUBLE LUMEN

## 2023-05-10 PROCEDURE — 84295 ASSAY OF SERUM SODIUM: CPT | Performed by: STUDENT IN AN ORGANIZED HEALTH CARE EDUCATION/TRAINING PROGRAM

## 2023-05-10 PROCEDURE — 36569 INSJ PICC 5 YR+ W/O IMAGING: CPT

## 2023-05-10 PROCEDURE — 99418 PROLNG IP/OBS E/M EA 15 MIN: CPT | Performed by: STUDENT IN AN ORGANIZED HEALTH CARE EDUCATION/TRAINING PROGRAM

## 2023-05-10 PROCEDURE — 250N000011 HC RX IP 250 OP 636: Performed by: STUDENT IN AN ORGANIZED HEALTH CARE EDUCATION/TRAINING PROGRAM

## 2023-05-10 PROCEDURE — 36415 COLL VENOUS BLD VENIPUNCTURE: CPT | Performed by: INTERNAL MEDICINE

## 2023-05-10 PROCEDURE — 250N000013 HC RX MED GY IP 250 OP 250 PS 637: Performed by: PHYSICIAN ASSISTANT

## 2023-05-10 PROCEDURE — 82310 ASSAY OF CALCIUM: CPT | Performed by: STUDENT IN AN ORGANIZED HEALTH CARE EDUCATION/TRAINING PROGRAM

## 2023-05-10 PROCEDURE — 85004 AUTOMATED DIFF WBC COUNT: CPT | Performed by: STUDENT IN AN ORGANIZED HEALTH CARE EDUCATION/TRAINING PROGRAM

## 2023-05-10 PROCEDURE — 80048 BASIC METABOLIC PNL TOTAL CA: CPT | Performed by: STUDENT IN AN ORGANIZED HEALTH CARE EDUCATION/TRAINING PROGRAM

## 2023-05-10 PROCEDURE — 99233 SBSQ HOSP IP/OBS HIGH 50: CPT | Performed by: INTERNAL MEDICINE

## 2023-05-10 PROCEDURE — 84295 ASSAY OF SERUM SODIUM: CPT | Performed by: INTERNAL MEDICINE

## 2023-05-10 PROCEDURE — 250N000013 HC RX MED GY IP 250 OP 250 PS 637: Performed by: STUDENT IN AN ORGANIZED HEALTH CARE EDUCATION/TRAINING PROGRAM

## 2023-05-10 PROCEDURE — 250N000011 HC RX IP 250 OP 636: Performed by: INTERNAL MEDICINE

## 2023-05-10 PROCEDURE — 200N000001 HC R&B ICU

## 2023-05-10 PROCEDURE — 250N000009 HC RX 250: Performed by: STUDENT IN AN ORGANIZED HEALTH CARE EDUCATION/TRAINING PROGRAM

## 2023-05-10 PROCEDURE — 36415 COLL VENOUS BLD VENIPUNCTURE: CPT | Performed by: STUDENT IN AN ORGANIZED HEALTH CARE EDUCATION/TRAINING PROGRAM

## 2023-05-10 PROCEDURE — 99233 SBSQ HOSP IP/OBS HIGH 50: CPT | Performed by: STUDENT IN AN ORGANIZED HEALTH CARE EDUCATION/TRAINING PROGRAM

## 2023-05-10 RX ORDER — ENOXAPARIN SODIUM 100 MG/ML
40 INJECTION SUBCUTANEOUS EVERY 24 HOURS
Status: DISCONTINUED | OUTPATIENT
Start: 2023-05-10 | End: 2023-05-16 | Stop reason: HOSPADM

## 2023-05-10 RX ORDER — LISINOPRIL 40 MG/1
TABLET ORAL
Qty: 90 TABLET | Refills: 2 | Status: SHIPPED | OUTPATIENT
Start: 2023-05-10 | End: 2023-07-05

## 2023-05-10 RX ORDER — LIDOCAINE 40 MG/G
CREAM TOPICAL
Status: ACTIVE | OUTPATIENT
Start: 2023-05-10 | End: 2023-05-13

## 2023-05-10 RX ORDER — SODIUM CHLORIDE 3 G/100ML
50 INJECTION, SOLUTION INTRAVENOUS ONCE
Status: COMPLETED | OUTPATIENT
Start: 2023-05-10 | End: 2023-05-10

## 2023-05-10 RX ORDER — 3% SODIUM CHLORIDE 3 G/100ML
INJECTION, SOLUTION INTRAVENOUS CONTINUOUS
Status: DISCONTINUED | OUTPATIENT
Start: 2023-05-10 | End: 2023-05-11

## 2023-05-10 RX ORDER — GABAPENTIN 300 MG/1
300 CAPSULE ORAL 2 TIMES DAILY
Status: DISCONTINUED | OUTPATIENT
Start: 2023-05-10 | End: 2023-05-12

## 2023-05-10 RX ORDER — HYDRALAZINE HYDROCHLORIDE 20 MG/ML
10 INJECTION INTRAMUSCULAR; INTRAVENOUS EVERY 6 HOURS PRN
Status: DISCONTINUED | OUTPATIENT
Start: 2023-05-10 | End: 2023-05-16 | Stop reason: HOSPADM

## 2023-05-10 RX ORDER — METOPROLOL SUCCINATE 50 MG/1
TABLET, EXTENDED RELEASE ORAL
Qty: 90 TABLET | Refills: 2 | Status: SHIPPED | OUTPATIENT
Start: 2023-05-10 | End: 2023-05-16

## 2023-05-10 RX ADMIN — HYDRALAZINE HYDROCHLORIDE 10 MG: 20 INJECTION INTRAMUSCULAR; INTRAVENOUS at 18:13

## 2023-05-10 RX ADMIN — LIDOCAINE HYDROCHLORIDE 2 ML: 10 INJECTION, SOLUTION EPIDURAL; INFILTRATION; INTRACAUDAL; PERINEURAL at 14:32

## 2023-05-10 RX ADMIN — SODIUM CHLORIDE 50 ML: 3 INJECTION, SOLUTION INTRAVENOUS at 17:05

## 2023-05-10 RX ADMIN — ACETAMINOPHEN 650 MG: 325 TABLET ORAL at 11:40

## 2023-05-10 RX ADMIN — GABAPENTIN 300 MG: 300 CAPSULE ORAL at 20:01

## 2023-05-10 RX ADMIN — LIDOCAINE HYDROCHLORIDE 2 ML: 10 INJECTION, SOLUTION EPIDURAL; INFILTRATION; INTRACAUDAL; PERINEURAL at 14:00

## 2023-05-10 RX ADMIN — INSULIN ASPART 2 UNITS: 100 INJECTION, SOLUTION INTRAVENOUS; SUBCUTANEOUS at 09:16

## 2023-05-10 RX ADMIN — INSULIN ASPART 1 UNITS: 100 INJECTION, SOLUTION INTRAVENOUS; SUBCUTANEOUS at 17:08

## 2023-05-10 RX ADMIN — LISINOPRIL 40 MG: 20 TABLET ORAL at 09:26

## 2023-05-10 RX ADMIN — ASPIRIN 81 MG: 81 TABLET, COATED ORAL at 09:26

## 2023-05-10 RX ADMIN — SODIUM CHLORIDE 25 ML/HR: 3 INJECTION, SOLUTION INTRAVENOUS at 15:47

## 2023-05-10 RX ADMIN — GABAPENTIN 100 MG: 100 CAPSULE ORAL at 09:26

## 2023-05-10 RX ADMIN — METOPROLOL SUCCINATE 100 MG: 100 TABLET, EXTENDED RELEASE ORAL at 09:26

## 2023-05-10 RX ADMIN — INSULIN GLARGINE 10 UNITS: 100 INJECTION, SOLUTION SUBCUTANEOUS at 22:25

## 2023-05-10 RX ADMIN — ATORVASTATIN CALCIUM 40 MG: 40 TABLET, FILM COATED ORAL at 09:26

## 2023-05-10 RX ADMIN — ACETAMINOPHEN 650 MG: 325 TABLET ORAL at 00:21

## 2023-05-10 RX ADMIN — DILTIAZEM HYDROCHLORIDE 240 MG: 120 CAPSULE, COATED, EXTENDED RELEASE ORAL at 09:26

## 2023-05-10 RX ADMIN — INSULIN ASPART 1 UNITS: 100 INJECTION, SOLUTION INTRAVENOUS; SUBCUTANEOUS at 12:29

## 2023-05-10 RX ADMIN — ENOXAPARIN SODIUM 40 MG: 40 INJECTION SUBCUTANEOUS at 18:13

## 2023-05-10 ASSESSMENT — ACTIVITIES OF DAILY LIVING (ADL)
ADLS_ACUITY_SCORE: 40
ADLS_ACUITY_SCORE: 37
ADLS_ACUITY_SCORE: 40
ADLS_ACUITY_SCORE: 37
ADLS_ACUITY_SCORE: 39
ADLS_ACUITY_SCORE: 39
ADLS_ACUITY_SCORE: 37
ADLS_ACUITY_SCORE: 40
ADLS_ACUITY_SCORE: 37
ADLS_ACUITY_SCORE: 37

## 2023-05-10 NOTE — SIGNIFICANT EVENT
Significant Event Note    Time of event: 11:48 AM May 10, 2023    Description of event:  Notified by nursing regarding sodium of 111. Patient seen and is AAOx3, continues to complaining of burning sensation in hands and feet. Discussed with ICU Dr. Mohan and agrees to transfer to ICU. VAT consulted for PICC line placement. Nephrology page with updated sodium levels, awaiting to hear back regarding hypertonic saline. Will change BMP to Q2H.     Plan:  -Transfer to ICU  -awaiting nephrology for further reccs   -BMP Q2H     Discussed with: bedside nurse    Griselda Cote MD

## 2023-05-10 NOTE — PROGRESS NOTES
RENAL  NOTE- reviewed notes from admit, nephro consult yesterday. Pt seen earlier this am.     REQUESTING PHYSICIAN: Dr. Torres    REASON FOR CONSULT: Hyponatremia     ASSESSMENT/PLAN:    Acute hyponatremia - Baseline normonatremic, including at ED visit 5/6/2023 when Na+ was 136. Acutely down to 120 in the setting of uncontrolled pain, nausea, and hydrochlorothiazide use, NSAID x 1 since admit. Likely a component of excess ADH at play exacerbated by thiazide use, supported by Osm studies (urine Osm 822, serum Osm 262, Urine Na 43 in the setting of thiazide diuretic). TSH WNL 5/6/2023. Some elevated serum ketones but does not appear to be in DKA and BG's <200; Na+ correction for hyperglycemia is negligible.     Recs:   Na now worse due to oral water over noc and NS stopped.   Will give 3% Na to get Na back up. IV bolus 50cc and then 25cc/hr til Na ~120 range. q4 hour Na.   May need NaCl tabs and lasix short term to fully correct.   Long term avoid hydrochlorothiazide and NSAIDS  - BMP ordered Q4 hours by hospital service   - ongoing use of anti-emetics and pain control but avoid further NSAID use as these can worsen hyponatremia   - Goal correction 6-8 mmol/L over 24 hours     Polyneuropathy - Long-standing history of DM in stocking/glove distribution makes diabetic neuropathy likely. Ok to continue gabapentin from nephrology standpoint. Pain control will be important in regulating excess ADH.   Try increasing gabapentin.     Hypertension - BP elevated here. PTA hydrochlorothiazide on hold with hyponatremia. Increase metoprolol XL to 100 mg daily, maintain lisinopril 40 mg, diltiazem  mg.  PRNS ordered.     Type 2 diabetes - Elevated ketones, BG's here <200. Na+ correction for hyperglycemia is negligible. Management per primary service.     CKD 3a - Baseline Cr 1.1-1.4, eGFR 55-60+. Currently Cr improved from baseline. He does not follow with nephrology regularly and could be seen in our clinic after  discharge if he desired.     Hyperlipidemia - on statin     HPI: Vashti King is a 78 yo M with PMH significant for hypertension, CKD 3a, type 2 diabetes,  who presented to [facility] ED for the third time this week with complaint of polyneuropathy of his hands and feet; he was started on gabapentin 5/7/2023 and since that time developed nausea and vomiting. Na+ was noted to be down acutely to 120 (from 136 on 5/6/2023) and nephrology was consulted. Patient evaluated in the ED with son at bedside and professional [facility] [place], [place]  via phone. He is somewhat of a difficult historian and some difficulty getting a straight answer out of him. He reports feeling a little better than when he came in. Tells me that he had significant nausea and two episodes of vomiting PTA; one last night after dinner and this morning. The pain and weakness in his arms and legs is his chief complaint; says gabapentin helped with this a little but he has not been able to find a comfortable position for sleeping recently due to the pain. Not more confused and mental status is at baseline per his son. He denies any dizziness, lightheadedness, shortness of breath, or edema. He feels he has been urinating normally. He has been taking his blood pressure medications regularly including his hydrochlorothiazide.     This am seen with help of son at bedside interpreting.   N/v stopped.  No diarrhea.   Drinking overnoc mostly water.   Ongoing neuropathy complaints, started gabapentin on May 7  Concerned that his appetite has not returned but food just arrived and will try to eat.   No HA    REVIEW OF SYSTEMS:  Comprehensive ROS negative except per HPI     ALLERGIES/SENSITIVITIES:  Allergies   Allergen Reactions     Amlodipine Swelling     Swelling in legs      Social History     Tobacco Use     Smoking status: Never     Smokeless tobacco: Never   Substance Use Topics     Drug use: No     I have reviewed this patient's family history and updated it  with pertinent information if needed.  Family History   Problem Relation Age of Onset     Diabetes Sister      Hypertension Sister      Diabetes Brother      Hypertension Brother      Cerebrovascular Disease Brother          PHYSICAL EXAM:  Physical Exam   Temp: 98  F (36.7  C) Temp src: Oral BP: (!) 152/100 Pulse: 77   Resp: 16 SpO2: 95 % O2 Device: None (Room air)    Vitals:    05/09/23 0752   Weight: 81.6 kg (180 lb)     Vital Signs with Ranges  Temp:  [97.7  F (36.5  C)-98.7  F (37.1  C)] 98  F (36.7  C)  Pulse:  [73-81] 77  Resp:  [16-20] 16  BP: (146-167)/() 152/100  SpO2:  [95 %-98 %] 95 %  I/O last 3 completed shifts:  In: 1503 [P.O.:880; I.V.:623]  Out: -     @TMAXR(24)@    Patient Vitals for the past 72 hrs:   Weight   05/09/23 0752 81.6 kg (180 lb)       General - A & O x 3, NAD   HEENT - AT NC mm dry  Neck - no JVD  Respiratory - Lungs CTA bilat without wheeze, rhonchi, rales  Cardiovascular - AP RRR with murmur  Abdomen - soft, BS present, non tender.   Extremities - well perfused, no edema  Integumentary - intact, good turgor, no rash/lesions  Neurologic - grossly intact  Psych:  Judgement intact, affect WNL    Laboratory:     Recent Labs   Lab 05/10/23  0529 05/09/23  0847 05/06/23  1856   WBC 8.1 7.6 7.0   RBC 5.11 5.48 5.39   HGB 15.3 16.8 16.5   HCT 41.5 45.2 46.2    273 259       Basic Metabolic Panel:  Recent Labs   Lab 05/10/23  1004 05/10/23  0812 05/10/23  0529 05/10/23  0145 05/09/23  2238 05/09/23  2207 05/09/23  1821 05/09/23  1241 05/09/23  0847   *  --  117* 118* 116*  --  116*  --  120*   POTASSIUM 3.9  --  3.7 3.6 3.7  --  4.0  --  4.0   CHLORIDE 78*  --  79* 79* 79*  --  80*  --  81*   CO2 18*  --  22 20* 21*  --  18*  --  20*   BUN 18.4  --  18.6 17.1 16.3  --  16.5  --  15.5   CR 0.68  --  0.81 0.77 0.81  --  0.82  --  0.78   * 222* 134* 135* 147* 177* 167*   < > 176*   HELEN 8.9  --  8.7* 8.5* 8.6*  --  8.9  --  9.3    < > = values in this interval not  displayed.       INRNo lab results found in last 7 days.    Recent Labs   Lab Test 05/09/23  0847 05/06/23  1856   POTASSIUM 4.0 4.4   CHLORIDE 81* 96*   BUN 15.5 16.1      Recent Labs   Lab Test 05/09/23  1155 05/09/23  0847 12/06/22  0951 08/30/22  1010   ALBUMIN  --  4.5 4.5  --    BILITOTAL  --  1.6* 0.7  --    ALT  --  21 23  --    AST  --  29 39  --    PROTEIN 20*  --   --  Negative       Personally reviewed today's laboratory studies      Thank you for involving us in the care of this patient. We will continue to follow along with you.    Ameena Hays MD  [facility]  [phone]

## 2023-05-10 NOTE — PROGRESS NOTES
RENAL     Repeat Na+ down to 116. Will stop NS infusion. Recheck Na+ ordered with BMP every 4 hours.     Deysi Michelle PA-C   [facility]   [phone]

## 2023-05-10 NOTE — PLAN OF CARE
Goal Outcome Evaluation:      Plan of Care Reviewed With: patient    Problem: Electrolyte Imbalance  Goal: Electrolyte Imbalance: Plan of Care  Outcome: Progressing   BMP checked every 4 hours. Fluids discontinued per nephrology.   Pt. On TELE.      Pt. States he has burning in bilateral hands, feet and also his back. Pt. Declines any pain medication.     Report given to Rhys on P4. Wife, son and pt. Informed of transfer.     Laura Pillai RN

## 2023-05-10 NOTE — PLAN OF CARE
"  Problem: Plan of Care - These are the overarching goals to be used throughout the patient stay.    Goal: Absence of Hospital-Acquired Illness or Injury  Intervention: Identify and Manage Fall Risk  Recent Flowsheet Documentation  Taken 5/10/2023 0017 by Shwetha Silva RN  Safety Promotion/Fall Prevention: assistive device/personal items within reach     Problem: Plan of Care - These are the overarching goals to be used throughout the patient stay.    Goal: Patient-Specific Goal (Individualized)  Description: You can add care plan individualizations to a care plan. Examples of Individualization might be:  \"Parent requests to be called daily at 9am for status\", \"I have a hard time hearing out of my right ear\", or \"Do not touch me to wake me up as it startles me\".  Outcome: Progressing   Goal Outcome Evaluation:       Pt is A/O X3 forgetful to time, denied pain, complained of feeling hot, tylenol given, family at bedside, BP elevated, on room air, no complains of nausea, pt is independent in the room, Na check was 118 at 1:45, house notified.                 "

## 2023-05-10 NOTE — PROGRESS NOTES
House officer paged at 1845 regarding sodium. No call back. House officer paged again at 1915, no call back yet. Nephrology aware of potassium, see note.     Laura Pillai RN

## 2023-05-10 NOTE — PROCEDURES
"PICC Line Insertion Procedure Note  Pt. Name: Vashti King  MRN:        [id number]    Procedure: Insertion of a  dual Lumen  5 fr  Bard SOLO (valved) Power PICC, Lot number [id number]     Indications: access/fluids    Contraindications : none    Procedure Details     Patient identified with 2 identifiers and \"Time Out\" conducted.  .     Central line insertion bundle followed: hand hygiene performed prior to procedure, site cleansed with cholraprep, hat, mask, sterile gloves, sterile gown worn, patient draped with maximum barrier head to toe drape, sterile field maintained.    The vein was assessed and found to be compressible and of adequate size.     Lidocaine 1% 2 ml administered sq to the insertion site. A 5 Fr PICC was inserted into the Basilic vein of the right arm with ultrasound guidance. 1 attempt(s) required to access vein.   Catheter threaded with difficulty. Unable to thread past 20cm, much resistance in the subclavian area. Right side attempt was aborted. I went to the left arm. Lidocaine 1% 2ml injected subcutaneous into the insertion site. A 5 Fr PICC was inserted into the Basilic vein of the LEFT arm with ultrasound guidance. 1 attempt required to access the vein. The catheter threaded with some difficulty. Good blood return noted.     Modified Seldinger Technique used for insertion.    The 16 sharps that are included in the PICC insertion kit were accounted for and disposed of in the sharps container prior to breakdown of the sterile field.    Catheter secured with Statlock, biopatch and Tegaderm dressing applied.    Findings:    Total catheter length  47 cm, with 0 cm exposed. Mid upper arm circumference is 31 cm. Catheter was flushed with 20 cc NS. Patient  tolerated procedure well.    Tip placement verified by [employer] . Tip placement in the SVC.      CLABSI prevention brochure left at bedside.    Patient's primary RN notified PICC is ready for use.      Comments:      Caryn Rowley " RN    Vascular Access - [facility]

## 2023-05-10 NOTE — PLAN OF CARE
"  Problem: Plan of Care - These are the overarching goals to be used throughout the patient stay.    Goal: Plan of Care Review  Description: The Plan of Care Review/Shift note should be completed every shift.  The Outcome Evaluation is a brief statement about your assessment that the patient is improving, declining, or no change.  This information will be displayed automatically on your shift note.  Outcome: Progressing  Goal: Patient-Specific Goal (Individualized)  Description: You can add care plan individualizations to a care plan. Examples of Individualization might be:  \"Parent requests to be called daily at 9am for status\", \"I have a hard time hearing out of my right ear\", or \"Do not touch me to wake me up as it startles me\".  Outcome: Progressing  Goal: Absence of Hospital-Acquired Illness or Injury  Outcome: Progressing  Goal: Optimal Comfort and Wellbeing  Outcome: Progressing  Goal: Readiness for Transition of Care  Outcome: Progressing     Problem: Risk for Delirium  Goal: Optimal Coping  Outcome: Progressing  Goal: Improved Behavioral Control  Outcome: Progressing  Goal: Improved Attention and Thought Clarity  Outcome: Progressing  Goal: Improved Sleep  Outcome: Progressing     Problem: Electrolyte Imbalance  Goal: Electrolyte Imbalance: Plan of Care  Outcome: Progressing     Problem: Diabetes Comorbidity  Goal: Blood Glucose Level Within Targeted Range  Outcome: Progressing     Problem: Hypertension Acute  Goal: Blood Pressure Within Desired Range  Outcome: Progressing   Goal Outcome Evaluation:         Pt is alert and oriented x4. Pt speak Hmong and his son at the bed side. Pt NA is 116 and nephrology is consulted. No nausea and vomiting and will continue to monitor.                "

## 2023-05-10 NOTE — PLAN OF CARE
Problem: Plan of Care - These are the overarching goals to be used throughout the patient stay.    Goal: Plan of Care Review  Description: The Plan of Care Review/Shift note should be completed every shift.  The Outcome Evaluation is a brief statement about your assessment that the patient is improving, declining, or no change.  This information will be displayed automatically on your shift note.  Outcome: Progressing   Goal Outcome Evaluation:         Educated pt & pts spouse & son at bedside on treatment plan. Informed all of transfer request to ICU from Hospitalist d/t hyponatremia. Writer transferred pt personally to ICU accompanied by pts family.           Problem: Electrolyte Imbalance  Goal: Electrolyte Imbalance: Plan of Care  Outcome: Not Progressing   Writer at bedside prior with Dr. Hays (nephrologist). Timed BMP not due for approx. 1 hr so writer ordered Na+ to be drawn STAT per MD request for lab draw. MD stated she will keep an eye out for results. Writer received critical lab of Na+ down to 111. Writer updated Dr. Cote (Hospitalist) & Nephrology paged. Neph MD placed orders, Hospitalist placed ICU tx orders d/t hyponatremia. Re-draw of Na+ prior to pts transfer of Na+ 113. Hospitalist updated again, pt had transferred to ICU. Pt remained on tele while on P4 and at transfer, 1st degree AV block noted. Pt denied pain other than severe neuropathy which pt had PTA but noticed worsening. MD aware, per neph MD, may increase gabapentin, they will look into med change/increase. Sched. Gabapentin & PRN APAP admin. Prior to tx. Blood glucose checked per orders, see MAR. Pt declined lunch prior to tx as he ate breakfast late. All personal belongings packed up by son and brought down to Rm. 350. Report given to Keyana, ICU RN prior to transfer.

## 2023-05-10 NOTE — PROGRESS NOTES
[facility]    Medicine Progress Note - Hospitalist Service    Date of Admission:  5/9/2023    Assessment & Plan   Vashti King is a 77 year old male admitted on 5/9/2023. He has a history of hypertension, diabetes, BPH, CKD presented with nausea and vomiting.  Patient was seen a couple days ago in the ED for burning sensation of the hands and feet and he was started on gabapentin on 5/7/2023.  He stated that he started to have this nausea vomiting after starting the medication.  Per the son at the bedside, patient does not have mental status changes or seizure activity.  Otherwise she denies abdominal pain, urinary or bowel habit changes, fever or chills, no cough shortness of breath, chest pain, or bilateral leg swelling. In the ED, sodium was 120 down from 136 three days prior.     Acute hyponatremia  -In the setting of HCTZ use and nausea and vomiting  -Received IVF and Lasix on 5/9, serial sodiums as of this AM still down trending   -Repeat urine sodium and urine osm   -Transfer to ICU and start hypertonic saline, PICC line placed   -Goal sodium ~120; further reccs per nephrology   -No neurological changes today, monitor closely  -Advised patient and family members to not bring any outside food or drinks (he has multiple drinks around his bedside)    Essential hypertension  -HCTZ held due to hyponatremia  -Continue with lisinopril, diltiazem, and increase metoprolol to 100     Diabetes mellitus II  -Accu-Cheks, sliding scale, lantus  -Hemoglobin A1c 7.9  -Glucose within range, repeat UA     Polyneuropathy-likely secondary to diabetes mellitus  -Continue with gabapentin- increase to 300 BID today     H/o CKD stage IIIa  - cr at admission 0.78 (baseline 1.1-1.4)  -Monitor BMP, I/O    Hyperlipidemia  -Continue with PTA statin        Diet: Combination Diet Moderate Consistent Carb (60 g CHO per Meal) Diet    DVT Prophylaxis: Enoxaparin (Lovenox) SQ  [place]da[place] Catheter: Not present  Lines: None    "  Cardiac Monitoring: None  Code Status: Full Code      Clinically Significant Risk Factors Present on Admission         # Hyponatremia: Lowest Na = 116 mmol/L in last 2 days, will monitor as appropriate     # Anion Gap Metabolic Acidosis: Highest Anion Gap = 19 mmol/L in last 2 days, will monitor and treat as appropriate      # Hypertension: home medication list includes antihypertensive(s)     # DMII: A1C = 7.9 % (Ref range: <5.7 %) within past 6 months    # Overweight: Estimated body mass index is 29.95 kg/m  as calculated from the following:    Height as of this encounter: 1.651 m (5' 5\").    Weight as of this encounter: 81.6 kg (180 lb).           Disposition Plan      Expected Discharge Date: 05/11/2023                  Griselda Cote MD  Hospitalist Service  [facility]  Securely message with [employer] (more info)  Text page via [employer] Paging/Directory   ______________________________________________________________________    Interval History   Patient complains of burning in hands and feet, denie sany other symptoms    Physical Exam   Vital Signs: Temp: 98  F (36.7  C) Temp src: Oral BP: (!) 161/96 Pulse: 79   Resp: 16 SpO2: 97 % O2 Device: None (Room air)    Weight: 180 lbs 0 oz    Physical Exam  Constitutional:       General: He is not in acute distress.     Appearance: He is obese. He is not toxic-appearing.   HENT:      Head: Normocephalic and atraumatic.      Mouth/Throat:      Mouth: Mucous membranes are moist.   Cardiovascular:      Rate and Rhythm: Normal rate and regular rhythm.   Abdominal:      Palpations: Abdomen is soft.      Tenderness: There is no abdominal tenderness. There is no guarding or rebound.   Musculoskeletal:         General: No swelling.   Neurological:      General: No focal deficit present.      Mental Status: He is alert and oriented to person, place, and time.       Medical Decision Making       65 MINUTES SPENT BY ME on the date of service doing chart " review, history, exam, documentation & further activities per the note.      Data     I have personally reviewed the following data over the past 24 hrs:    8.1  \   15.3   / 253     112 (LL) 78 (L) 17.9 /  176 (H)   4.0 21 (L) 0.68 \       ALT: 19 AST: 38 AP: 56 TBILI: 1.5 (H)   ALB: 4.2 TOT PROTEIN: 7.3 LIPASE: N/A       Imaging results reviewed over the past 24 hrs:   No results found for this or any previous visit (from the past 24 hour(s)).

## 2023-05-11 LAB
ALBUMIN UR-MCNC: NEGATIVE MG/DL
APPEARANCE UR: CLEAR
BILIRUB UR QL STRIP: NEGATIVE
COLOR UR AUTO: ABNORMAL
GLUCOSE BLDC GLUCOMTR-MCNC: 150 MG/DL (ref 70–99)
GLUCOSE BLDC GLUCOMTR-MCNC: 179 MG/DL (ref 70–99)
GLUCOSE BLDC GLUCOMTR-MCNC: 182 MG/DL (ref 70–99)
GLUCOSE BLDC GLUCOMTR-MCNC: 187 MG/DL (ref 70–99)
GLUCOSE BLDC GLUCOMTR-MCNC: 194 MG/DL (ref 70–99)
GLUCOSE UR STRIP-MCNC: >1000 MG/DL
HGB UR QL STRIP: NEGATIVE
KETONES UR STRIP-MCNC: 100 MG/DL
LEUKOCYTE ESTERASE UR QL STRIP: NEGATIVE
NITRATE UR QL: NEGATIVE
OSMOLALITY UR: 626 MMOL/KG (ref 100–1200)
PH UR STRIP: 5.5 [PH] (ref 5–7)
RBC URINE: <1 /HPF
SODIUM SERPL-SCNC: 116 MMOL/L (ref 136–145)
SODIUM SERPL-SCNC: 117 MMOL/L (ref 136–145)
SODIUM SERPL-SCNC: 119 MMOL/L (ref 136–145)
SODIUM SERPL-SCNC: 121 MMOL/L (ref 136–145)
SODIUM SERPL-SCNC: 121 MMOL/L (ref 136–145)
SODIUM UR-SCNC: 70 MMOL/L
SP GR UR STRIP: 1.02 (ref 1–1.03)
UROBILINOGEN UR STRIP-MCNC: <2 MG/DL
WBC URINE: <1 /HPF

## 2023-05-11 PROCEDURE — 99233 SBSQ HOSP IP/OBS HIGH 50: CPT | Performed by: INTERNAL MEDICINE

## 2023-05-11 PROCEDURE — 84295 ASSAY OF SERUM SODIUM: CPT | Performed by: INTERNAL MEDICINE

## 2023-05-11 PROCEDURE — 84300 ASSAY OF URINE SODIUM: CPT | Performed by: STUDENT IN AN ORGANIZED HEALTH CARE EDUCATION/TRAINING PROGRAM

## 2023-05-11 PROCEDURE — 250N000013 HC RX MED GY IP 250 OP 250 PS 637: Performed by: INTERNAL MEDICINE

## 2023-05-11 PROCEDURE — 250N000011 HC RX IP 250 OP 636: Performed by: INTERNAL MEDICINE

## 2023-05-11 PROCEDURE — 99232 SBSQ HOSP IP/OBS MODERATE 35: CPT | Performed by: STUDENT IN AN ORGANIZED HEALTH CARE EDUCATION/TRAINING PROGRAM

## 2023-05-11 PROCEDURE — 99207 PR CDG-CUT & PASTE-POTENTIAL IMPACT ON LEVEL: CPT | Performed by: STUDENT IN AN ORGANIZED HEALTH CARE EDUCATION/TRAINING PROGRAM

## 2023-05-11 PROCEDURE — 250N000013 HC RX MED GY IP 250 OP 250 PS 637: Performed by: STUDENT IN AN ORGANIZED HEALTH CARE EDUCATION/TRAINING PROGRAM

## 2023-05-11 PROCEDURE — 81001 URINALYSIS AUTO W/SCOPE: CPT | Performed by: STUDENT IN AN ORGANIZED HEALTH CARE EDUCATION/TRAINING PROGRAM

## 2023-05-11 PROCEDURE — 83935 ASSAY OF URINE OSMOLALITY: CPT | Performed by: STUDENT IN AN ORGANIZED HEALTH CARE EDUCATION/TRAINING PROGRAM

## 2023-05-11 PROCEDURE — 200N000001 HC R&B ICU

## 2023-05-11 PROCEDURE — 250N000011 HC RX IP 250 OP 636: Performed by: STUDENT IN AN ORGANIZED HEALTH CARE EDUCATION/TRAINING PROGRAM

## 2023-05-11 RX ORDER — 3% SODIUM CHLORIDE 3 G/100ML
INJECTION, SOLUTION INTRAVENOUS CONTINUOUS
Status: DISCONTINUED | OUTPATIENT
Start: 2023-05-11 | End: 2023-05-12

## 2023-05-11 RX ORDER — CARVEDILOL 12.5 MG/1
25 TABLET ORAL 2 TIMES DAILY WITH MEALS
Status: DISCONTINUED | OUTPATIENT
Start: 2023-05-11 | End: 2023-05-12

## 2023-05-11 RX ORDER — SODIUM CHLORIDE 1 G/1
1 TABLET ORAL
Status: DISCONTINUED | OUTPATIENT
Start: 2023-05-11 | End: 2023-05-12

## 2023-05-11 RX ORDER — METOPROLOL SUCCINATE 50 MG/1
50 TABLET, EXTENDED RELEASE ORAL ONCE
Status: COMPLETED | OUTPATIENT
Start: 2023-05-11 | End: 2023-05-11

## 2023-05-11 RX ORDER — BISACODYL 5 MG
5 TABLET, DELAYED RELEASE (ENTERIC COATED) ORAL DAILY PRN
Status: DISCONTINUED | OUTPATIENT
Start: 2023-05-11 | End: 2023-05-16 | Stop reason: HOSPADM

## 2023-05-11 RX ORDER — POLYETHYLENE GLYCOL 3350 17 G/17G
17 POWDER, FOR SOLUTION ORAL DAILY PRN
Status: DISCONTINUED | OUTPATIENT
Start: 2023-05-11 | End: 2023-05-16 | Stop reason: HOSPADM

## 2023-05-11 RX ORDER — FUROSEMIDE 20 MG
20 TABLET ORAL DAILY
Status: DISCONTINUED | OUTPATIENT
Start: 2023-05-11 | End: 2023-05-12

## 2023-05-11 RX ORDER — METOPROLOL SUCCINATE 50 MG/1
150 TABLET, EXTENDED RELEASE ORAL DAILY
Status: DISCONTINUED | OUTPATIENT
Start: 2023-05-12 | End: 2023-05-11

## 2023-05-11 RX ADMIN — ATORVASTATIN CALCIUM 40 MG: 40 TABLET, FILM COATED ORAL at 08:10

## 2023-05-11 RX ADMIN — DILTIAZEM HYDROCHLORIDE 120 MG: 120 CAPSULE, COATED, EXTENDED RELEASE ORAL at 09:38

## 2023-05-11 RX ADMIN — DILTIAZEM HYDROCHLORIDE 120 MG: 120 CAPSULE, COATED, EXTENDED RELEASE ORAL at 08:10

## 2023-05-11 RX ADMIN — METOPROLOL SUCCINATE 100 MG: 100 TABLET, EXTENDED RELEASE ORAL at 08:10

## 2023-05-11 RX ADMIN — GABAPENTIN 300 MG: 300 CAPSULE ORAL at 08:10

## 2023-05-11 RX ADMIN — BISACODYL 5 MG: 5 TABLET, COATED ORAL at 17:50

## 2023-05-11 RX ADMIN — METOPROLOL SUCCINATE 50 MG: 50 TABLET, EXTENDED RELEASE ORAL at 09:38

## 2023-05-11 RX ADMIN — INSULIN ASPART 1 UNITS: 100 INJECTION, SOLUTION INTRAVENOUS; SUBCUTANEOUS at 17:49

## 2023-05-11 RX ADMIN — HYDRALAZINE HYDROCHLORIDE 10 MG: 20 INJECTION INTRAMUSCULAR; INTRAVENOUS at 12:13

## 2023-05-11 RX ADMIN — ASPIRIN 81 MG: 81 TABLET, COATED ORAL at 08:10

## 2023-05-11 RX ADMIN — SODIUM CHLORIDE TAB 1 GM 1 G: 1 TAB at 17:20

## 2023-05-11 RX ADMIN — GABAPENTIN 300 MG: 300 CAPSULE ORAL at 20:09

## 2023-05-11 RX ADMIN — SODIUM CHLORIDE TAB 1 GM 1 G: 1 TAB at 11:15

## 2023-05-11 RX ADMIN — ACETAMINOPHEN 650 MG: 325 TABLET ORAL at 06:38

## 2023-05-11 RX ADMIN — LISINOPRIL 40 MG: 20 TABLET ORAL at 08:10

## 2023-05-11 RX ADMIN — FUROSEMIDE 20 MG: 20 TABLET ORAL at 09:38

## 2023-05-11 RX ADMIN — CARVEDILOL 25 MG: 12.5 TABLET, FILM COATED ORAL at 17:50

## 2023-05-11 RX ADMIN — INSULIN ASPART 2 UNITS: 100 INJECTION, SOLUTION INTRAVENOUS; SUBCUTANEOUS at 17:22

## 2023-05-11 RX ADMIN — ENOXAPARIN SODIUM 40 MG: 40 INJECTION SUBCUTANEOUS at 17:20

## 2023-05-11 RX ADMIN — INSULIN ASPART 1 UNITS: 100 INJECTION, SOLUTION INTRAVENOUS; SUBCUTANEOUS at 12:29

## 2023-05-11 RX ADMIN — INSULIN ASPART 1 UNITS: 100 INJECTION, SOLUTION INTRAVENOUS; SUBCUTANEOUS at 07:44

## 2023-05-11 RX ADMIN — SODIUM CHLORIDE: 3 INJECTION, SOLUTION INTRAVENOUS at 11:22

## 2023-05-11 RX ADMIN — ACETAMINOPHEN 650 MG: 325 TABLET ORAL at 00:23

## 2023-05-11 ASSESSMENT — ACTIVITIES OF DAILY LIVING (ADL)
ADLS_ACUITY_SCORE: 44
ADLS_ACUITY_SCORE: 40

## 2023-05-11 NOTE — PLAN OF CARE
Goal Outcome Evaluation:      Plan of Care Reviewed With: patient, child    Overall Patient Progress: improvingOverall Patient Progress: improving    Outcome Evaluation: Sodium level dropped and restarted 3% infusion; NA labs every 4 hours.      Problem: Plan of Care - These are the overarching goals to be used throughout the patient stay.    Goal: Optimal Comfort and Wellbeing  Intervention: Monitor Pain and Promote Comfort  Recent Flowsheet Documentation  Taken 5/11/2023 1200 by Елена Dee RN  Pain Management Interventions: (declined medication) --  Taken 5/11/2023 0800 by Елена Dee RN  Pain Management Interventions: (declined medication)   relaxation techniques promoted   repositioned   declines     Problem: Hypertension Acute  Goal: Blood Pressure Within Desired Range  Outcome: Progressing  Intervention: Normalize Blood Pressure  Recent Flowsheet Documentation  Taken 5/11/2023 1200 by Елена Dee, RN  Medication Review/Management: medications reviewed  Taken 5/11/2023 0800 by Елена Dee, RN  Medication Review/Management: medications reviewed     Problem: Electrolyte Imbalance  Goal: Electrolyte Imbalance: Plan of Care  Outcome: Progressing   [facility] - ICU    RN Progress Note:            Pertinent Assessments:      Please refer to flowsheet rows for full assessment     Lungs clear.  Remains on RA without issue.  Good appetite.  Minimal back pain but no medications requested.  Voiding in urinal but can get up with assist of 1 and cane to restroom. Son at bedside and given ok to stay the night.          Key Events - This Shift:     Restarted 3% NA infusion per MD orders.  Sodium 117 at NOON with recheck at 1600.  Blood sugars in the 180-low 200's; carb count and sliding scale given.  Hydralazine given x1 for continued hypertension but improved significantly following medication.               Barriers to Discharge / Downgrade:     Sodium levels and  infusion.          Point of Contact Update YES-OR-NO: Yes  If No, reason:   Name: Maite   Phone Number:see chart  Summary of Conversation: At bedside during shift; spoke with MD's and care team regarding plan of care.

## 2023-05-11 NOTE — PROGRESS NOTES
RENAL  NOTE     REQUESTING PHYSICIAN: Dr. Torres    REASON FOR CONSULT: Hyponatremia     ASSESSMENT/PLAN:    Acute hyponatremia - Baseline normonatremic, including at ED visit 5/6/2023 when Na+ was 136. Acutely down to 120 5/9/23 in the setting of uncontrolled pain, nausea, and hydrochlorothiazide use, NSAID x 1 since admit. Likely a component of excess ADH exacerbated by thiazide use, supported by Osm studies (urine Osm 822, serum Osm 262, Urine Na 43 in the setting of thiazide diuretic). TSH WNL 5/6/2023. Some elevated serum ketones but does not appear to be in DKA and BG's <200; Na+ correction for hyperglycemia is negligible. Still dry yesterday. Now s/p 3% and Na was up to 121 but declined again off 3%.     Recs:   Resume 3% 20ml/hr til Na >120  Start Na Cl tabs 1 gr tid  Lasix 20 mg/day po  Long term avoid hydrochlorothiazide and NSAIDS  - Na q4 hours til Na stable in 120'2  - ongoing use of anti-emetics and pain control but avoid further NSAID use as these can worsen hyponatremia   - Goal correction to Na mid/ upper 120's over next 24 hours    Polyneuropathy - Long-standing history of DM in stocking/glove distribution makes diabetic neuropathy likely. Ok to continue gabapentin from nephrology standpoint. Pain control will be important in regulating excess ADH.   Try increasing gabapentin 300mg bid. Pain seems better controlled today.     Constipation defer to primary service.     Hypertension - BP elevated here. PTA hydrochlorothiazide on hold with hyponatremia. Increase metoprolol XL to 100 mg daily, maintain lisinopril 40 mg, diltiazem  mg.  PRNS ordered.   Appears he'll need more med, try inc metoprolol, or could change to coreg ??    Type 2 diabetes - Elevated ketones, BG's here </=200. Na+ correction for hyperglycemia is negligible. Management per primary service.     CKD 3a - Baseline Cr 1.1-1.4, eGFR 55-60+. Currently Cr improved from baseline. He does not follow with nephrology regularly  and could be seen in our clinic after discharge if he desired.     Hyperlipidemia - on statin     HPI: Vashti King is a 78 yo M with PMH significant for hypertension, CKD 3a, type 2 diabetes,  who presented to [facility] ED for the third time this week with complaint of polyneuropathy of his hands and feet; he was started on gabapentin 5/7/2023 and since that time developed nausea and vomiting. Na+ was noted to be down acutely to 120 (from 136 on 5/6/2023) and nephrology was consulted.     Na low yesterday am, started 3%, slow process difficult picc placement and then move to ICU.   Na up from as low as 111 yesterday am to 121 at 3 am, 3% off and now Na down to 116 again.   He is very awake alert and no c/o today. Wife here. Both speak English.   Eating no n/v   No BM since Sunday and asking for laxative.     REVIEW OF SYSTEMS:  Comprehensive ROS negative except per HPI     ALLERGIES/SENSITIVITIES:  Allergies   Allergen Reactions     Amlodipine Swelling     Swelling in legs      Social History     Tobacco Use     Smoking status: Never     Smokeless tobacco: Never   Substance Use Topics     Drug use: No     I have reviewed this patient's family history and updated it with pertinent information if needed.  Family History   Problem Relation Age of Onset     Diabetes Sister      Hypertension Sister      Diabetes Brother      Hypertension Brother      Cerebrovascular Disease Brother          PHYSICAL EXAM:  Physical Exam   Temp: 97.7  F (36.5  C) Temp src: Oral BP: (!) 159/91 Pulse: 84   Resp: 24 SpO2: 96 % O2 Device: None (Room air)    Vitals:    05/09/23 0752   Weight: 81.6 kg (180 lb)     Vital Signs with Ranges  Temp:  [96  F (35.6  C)-98.3  F (36.8  C)] 97.7  F (36.5  C)  Pulse:  [65-97] 84  Resp:  [16-48] 24  BP: (147-173)/() 159/91  SpO2:  [94 %-97 %] 96 %  I/O last 3 completed shifts:  In: 982.17 [P.O.:600; I.V.:382.17]  Out: 1700 [Urine:1700]    @TMAXR(24)@    Patient Vitals for the past 72 hrs:   Weight    05/09/23 0752 81.6 kg (180 lb)       General - A & O x 3, NAD   HEENT - AT NC mmm  Neck - no JVD  Respiratory - Lungs CTA bilat without crackles  Cardiovascular - AP RRR with murmur  Abdomen - soft, BS present, non tender.   Extremities - well perfused, no edema  Integumentary - intact, good turgor, no rash/lesions  Neurologic - grossly intact  Psych:  Judgement intact, affect WNL    Laboratory:     Recent Labs   Lab 05/10/23  0529 05/09/23  0847 05/06/23  1856   WBC 8.1 7.6 7.0   RBC 5.11 5.48 5.39   HGB 15.3 16.8 16.5   HCT 41.5 45.2 46.2    273 259       Basic Metabolic Panel:  Recent Labs   Lab 05/11/23  0818 05/11/23  0721 05/11/23  0315 05/10/23  2308 05/10/23  2221 05/10/23  2018 05/10/23  1819 05/10/23  1651 05/10/23  1546 05/10/23  1205 05/10/23  1004 05/10/23  0812 05/10/23  0529 05/10/23  0145   *  --  121* 116*  --  113* 112*  --  112*   < > 111*  --  117* 118*   POTASSIUM  --   --   --   --   --  3.9 4.2  --  4.0  --  3.9  --  3.7 3.6   CHLORIDE  --   --   --   --   --  80* 79*  --  78*  --  78*  --  79* 79*   CO2  --   --   --   --   --  19* 19*  --  21*  --  18*  --  22 20*   BUN  --   --   --   --   --  18.3 18.3  --  17.9  --  18.4  --  18.6 17.1   CR  --   --   --   --   --  0.71 0.66*  --  0.68  --  0.68  --  0.81 0.77   GLC  --  150*  --   --  209* 170* 214* 176* 146*   < > 166*   < > 134* 135*   HEELN  --   --   --   --   --  8.6* 8.5*  --  8.8  --  8.9  --  8.7* 8.5*    < > = values in this interval not displayed.       INRNo lab results found in last 7 days.    Recent Labs   Lab Test 05/09/23  0847 05/06/23  1856   POTASSIUM 4.0 4.4   CHLORIDE 81* 96*   BUN 15.5 16.1      Recent Labs   Lab Test 05/09/23  1155 05/09/23  0847 12/06/22  0951 08/30/22  1010   ALBUMIN  --  4.5 4.5  --    BILITOTAL  --  1.6* 0.7  --    ALT  --  21 23  --    AST  --  29 39  --    PROTEIN 20*  --   --  Negative       Personally reviewed today's laboratory studies      Thank you for involving us in the  care of this patient. We will continue to follow along with you.    Ameena Hays MD  [facility]  [phone]

## 2023-05-11 NOTE — PROGRESS NOTES
[facility]    Medicine Progress Note - Hospitalist Service    Date of Admission:  5/9/2023    Assessment & Plan   Vashti King is a 77 year old male admitted on 5/9/2023. He has a history of hypertension, diabetes, BPH, CKD presented with nausea and vomiting.  Patient was seen a couple days ago in the ED for burning sensation of the hands and feet and he was started on gabapentin on 5/7/2023.  He stated that he started to have this nausea vomiting after starting the medication.  Per the son at the bedside, patient does not have mental status changes or seizure activity.  Otherwise she denies abdominal pain, urinary or bowel habit changes, fever or chills, no cough shortness of breath, chest pain, or bilateral leg swelling. In the ED, sodium was 120 down from 136 three days prior.     Acute hyponatremia  -In the setting of HCTZ use and nausea and vomiting  -Received IVF and Lasix on 5/9, serial sodiums as of this AM still down trending   -Transferred to ICU on 5/10 and on hypertonic saline per nephrology management  -Repeat urine sodium and urine osm   -PICC line placed on 5/10  -No neurological changes  -Advised patient and family members to not bring any outside food or drinks (he has multiple drinks around his bedside)    Essential hypertension  -HCTZ held due to hyponatremia  -Continue with lisinopril 40 mg , diltiazem 240mg  -Stop Metoprolol and transition to carvedilol 25 BID and titrate as needed   -Hydralazine PRN      Diabetes mellitus II  -Increase lantus to 15 units at bedtime, continue meal time novolog and increase SSI  -Accu-Cheks, sliding scale, lantus  -Hemoglobin A1c 7.9    Polyneuropathy-likely secondary to diabetes mellitus  -Continue with gabapentin 300 BID today- seems to help     H/o CKD stage IIIa  - cr at admission 0.78 (baseline 1.1-1.4)  -Monitor BMP, I/O    Hyperlipidemia  -Continue with PTA statin    Updated son at bedside        Diet: Combination Diet Moderate  "Consistent Carb (60 g CHO per Meal) Diet  Fluid restriction 1200 ML FLUID (avoid anusha ice and water)    DVT Prophylaxis: Enoxaparin (Lovenox) SQ  [place]da[place] Catheter: Not present  Lines: PRESENT      PICC 05/10/23 Double Lumen Left Basilic Vascular Access-Site Assessment: WDL      Cardiac Monitoring: None  Code Status: Full Code      Clinically Significant Risk Factors         # Hyponatremia: Lowest Na = 111 mmol/L in last 2 days, will monitor as appropriate     # Anion Gap Metabolic Acidosis: Highest Anion Gap = 19 mmol/L in last 2 days, will monitor and treat as appropriate            # DMII: A1C = 7.9 % (Ref range: <5.7 %) within past 6 months, PRESENT ON ADMISSION  # Overweight: Estimated body mass index is 29.95 kg/m  as calculated from the following:    Height as of this encounter: 1.651 m (5' 5\").    Weight as of this encounter: 81.6 kg (180 lb)., PRESENT ON ADMISSION         Disposition Plan      Expected Discharge Date: 05/12/2023                  Griselda Cote MD  Hospitalist Service  [facility]  Securely message with [employer] (more info)  Text page via [facility] Paging/Directory   ______________________________________________________________________    Interval History   No complains of burning, eating and feeling better today. More interactive with family. Azalia any headaches or numbness/tingling    Physical Exam   Vital Signs: Temp: 97.7  F (36.5  C) Temp src: Oral BP: (!) 154/81 Pulse: 73   Resp: 21 SpO2: 96 % O2 Device: None (Room air)    Weight: 180 lbs 0 oz    Physical Exam  Constitutional:       General: He is not in acute distress.     Appearance: He is obese. He is not toxic-appearing.   HENT:      Head: Normocephalic and atraumatic.      Mouth/Throat:      Mouth: Mucous membranes are moist.   Cardiovascular:      Rate and Rhythm: Normal rate and regular rhythm.   Abdominal:      Palpations: Abdomen is soft.      Tenderness: There is no abdominal tenderness. There is no guarding " or rebound.   Musculoskeletal:         General: No swelling.   Neurological:      General: No focal deficit present.      Mental Status: He is alert and oriented to person, place, and time.       Medical Decision Making       40 MINUTES SPENT BY ME on the date of service doing chart review, history, exam, documentation & further activities per the note.      Data     I have personally reviewed the following data over the past 24 hrs:    N/A  \   N/A   / N/A     119 (LL) 80 (L) 18.3 /  194 (H)   3.9 19 (L) 0.71 \       Imaging results reviewed over the past 24 hrs:   No results found for this or any previous visit (from the past 24 hour(s)).

## 2023-05-11 NOTE — PROGRESS NOTES
Renal   Na up to 116 on 3% 50 ml/hr.   Will decrease to 20 ml/hr and recheck in 4 hours.   Discontinue 3% if Na >119.   Dr Yoder on call for MORRIS christian.  Ameena Hays MD  [facility]  [phone]

## 2023-05-11 NOTE — PLAN OF CARE
"  Problem: Plan of Care - These are the overarching goals to be used throughout the patient stay.    Goal: Plan of Care Review  Description: The Plan of Care Review/Shift note should be completed every shift.  The Outcome Evaluation is a brief statement about your assessment that the patient is improving, declining, or no change.  This information will be displayed automatically on your shift note.  Outcome: Progressing  Goal: Patient-Specific Goal (Individualized)  Description: You can add care plan individualizations to a care plan. Examples of Individualization might be:  \"Parent requests to be called daily at 9am for status\", \"I have a hard time hearing out of my right ear\", or \"Do not touch me to wake me up as it startles me\".  Outcome: Progressing  Goal: Absence of Hospital-Acquired Illness or Injury  Outcome: Progressing  Intervention: Identify and Manage Fall Risk  Recent Flowsheet Documentation  Taken 5/11/2023 0400 by Marcia Moraes, RN  Safety Promotion/Fall Prevention:   activity supervised   assistive device/personal items within reach   clutter free environment maintained   increased rounding and observation   increase visualization of patient   lighting adjusted   nonskid shoes/slippers when out of bed   patient and family education   room door open   room near nurse's station   room organization consistent   safety round/check completed  Taken 5/11/2023 0000 by Marcia Moraes, RN  Safety Promotion/Fall Prevention:   activity supervised   assistive device/personal items within reach   clutter free environment maintained   increased rounding and observation   increase visualization of patient   lighting adjusted   nonskid shoes/slippers when out of bed   patient and family education   room door open   room near nurse's station   room organization consistent   safety round/check completed  Taken 5/10/2023 2000 by Marcia Moraes, RN  Safety Promotion/Fall Prevention:   activity supervised   " assistive device/personal items within reach   clutter free environment maintained   increased rounding and observation   increase visualization of patient   lighting adjusted   nonskid shoes/slippers when out of bed   patient and family education   room door open   room near nurse's station   room organization consistent   safety round/check completed  Intervention: Prevent Skin Injury  Recent Flowsheet Documentation  Taken 5/11/2023 0638 by Marcia Moraes RN  Body Position:   position changed independently   heels elevated  Taken 5/11/2023 0400 by Marcia Moraes RN  Body Position:   position changed independently   heels elevated   foot of bed elevated  Taken 5/11/2023 0200 by Marcia Moraes RN  Body Position:   position changed independently   foot of bed elevated   heels elevated  Taken 5/11/2023 0000 by Marcia Moraes RN  Body Position:   position changed independently   heels elevated  Taken 5/10/2023 2302 by Marcia Moraes RN  Body Position:   position changed independently   heels elevated  Taken 5/10/2023 2000 by Marcia Moraes RN  Body Position:   position changed independently   heels elevated  Intervention: Prevent and Manage VTE (Venous Thromboembolism) Risk  Recent Flowsheet Documentation  Taken 5/11/2023 0400 by Marcia Moraes RN  VTE Prevention/Management: patient refused intervention  Taken 5/11/2023 0000 by Marcia Moraes RN  VTE Prevention/Management: patient refused intervention  Taken 5/10/2023 2000 by Marcia Moraes RN  VTE Prevention/Management: patient refused intervention  Goal: Optimal Comfort and Wellbeing  Outcome: Progressing  Intervention: Monitor Pain and Promote Comfort  Recent Flowsheet Documentation  Taken 5/11/2023 0638 by Marcia Moraes RN  Pain Management Interventions:   medication (see MAR)   emotional support   repositioned   rest   quiet environment facilitated  Taken 5/11/2023 0023 by Marcia Moraes RN  Pain Management Interventions:    medication (see MAR)   emotional support   repositioned   rest  Taken 5/10/2023 2000 by Marcia Moraes RN  Pain Management Interventions:   medication (see MAR)   emotional support   quiet environment facilitated   repositioned   rest  Intervention: Provide Person-Centered Care  Recent Flowsheet Documentation  Taken 5/11/2023 0400 by Marcia Moraes RN  Trust Relationship/Rapport:   care explained   choices provided   emotional support provided   empathic listening provided   questions answered   questions encouraged   reassurance provided   thoughts/feelings acknowledged  Taken 5/11/2023 0000 by Marcia Moraes RN  Trust Relationship/Rapport:   care explained   choices provided   emotional support provided   empathic listening provided   questions answered   questions encouraged   reassurance provided   thoughts/feelings acknowledged  Taken 5/10/2023 2000 by Marcia Moraes RN  Trust Relationship/Rapport:   care explained   choices provided   emotional support provided   empathic listening provided   questions answered   questions encouraged   reassurance provided   thoughts/feelings acknowledged  Goal: Readiness for Transition of Care  Outcome: Progressing     Problem: Risk for Delirium  Goal: Optimal Coping  Outcome: Progressing  Intervention: Optimize Psychosocial Adjustment to Delirium  Recent Flowsheet Documentation  Taken 5/11/2023 0400 by Marcai Moraes RN  Family/Support System Care:   involvement promoted   self-care encouraged   support provided  Taken 5/11/2023 0000 by Marcia Moraes RN  Family/Support System Care:   involvement promoted   self-care encouraged   support provided  Taken 5/10/2023 2000 by Marcia Moraes RN  Family/Support System Care:   involvement promoted   self-care encouraged   support provided  Goal: Improved Behavioral Control  Outcome: Progressing  Intervention: Prevent and Manage Agitation  Recent Flowsheet Documentation  Taken 5/11/2023 0400 by Marcia Moraes,  RN  Environment Familiarity/Consistency: daily routine followed  Taken 5/11/2023 0000 by Marcia Moraes RN  Environment Familiarity/Consistency: daily routine followed  Taken 5/10/2023 2000 by Marcia Moraes RN  Environment Familiarity/Consistency: daily routine followed  Intervention: Minimize Safety Risk  Recent Flowsheet Documentation  Taken 5/11/2023 0400 by Marcia Moraes RN  Enhanced Safety Measures:   family to remain at bedside   room near unit station  Trust Relationship/Rapport:   care explained   choices provided   emotional support provided   empathic listening provided   questions answered   questions encouraged   reassurance provided   thoughts/feelings acknowledged  Taken 5/11/2023 0000 by Marcia Moraes RN  Enhanced Safety Measures:   family to remain at bedside   room near unit station  Trust Relationship/Rapport:   care explained   choices provided   emotional support provided   empathic listening provided   questions answered   questions encouraged   reassurance provided   thoughts/feelings acknowledged  Taken 5/10/2023 2000 by Marcia Moraes RN  Enhanced Safety Measures:   family to remain at bedside   room near unit station  Trust Relationship/Rapport:   care explained   choices provided   emotional support provided   empathic listening provided   questions answered   questions encouraged   reassurance provided   thoughts/feelings acknowledged  Goal: Improved Attention and Thought Clarity  Outcome: Progressing  Intervention: Maximize Cognitive Function  Recent Flowsheet Documentation  Taken 5/11/2023 0400 by Marcia Moraes RN  Reorientation Measures:   calendar in view   clock in view   familiar social contact encouraged   hearing device use encouraged  Taken 5/11/2023 0000 by Marcia Moraes RN  Reorientation Measures:   calendar in view   clock in view   familiar social contact encouraged   hearing device use encouraged  Taken 5/10/2023 2000 by Marcia Moraes  RN  Reorientation Measures:   calendar in view   clock in view   familiar social contact encouraged   hearing device use encouraged  Goal: Improved Sleep  Outcome: Progressing     Problem: Electrolyte Imbalance  Goal: Electrolyte Imbalance: Plan of Care  Outcome: Progressing     Problem: Diabetes Comorbidity  Goal: Blood Glucose Level Within Targeted Range  Outcome: Progressing     Problem: Hypertension Acute  Goal: Blood Pressure Within Desired Range  Outcome: Progressing  Intervention: Normalize Blood Pressure  Recent Flowsheet Documentation  Taken 5/11/2023 0400 by Marcia Moraes RN  Medication Review/Management: medications reviewed  Taken 5/11/2023 0000 by Marcia Moraes RN  Medication Review/Management: medications reviewed  Taken 5/10/2023 2000 by Marcia Moraes RN  Medication Review/Management: medications reviewed   Goal Outcome Evaluation:       Pt's adult son was allowed to room-in overnight to aid pt with translation (primary Hmong speaking).  Pt complained frequently about his bed, stated it was terribly uncomfortable - requested new bed, new mattress, etc.  Pt kept his son essentially waiting on him all night.  Pt is reluctant to do things for himself - and requires a lot of encouragement and frequent requests.      Bed alarm on as pt's son let pt OOB to void per urinal with mattress in raised position.  Pt and son calling for help at least 3 times/hour.  Patient given Tylenol x 2 doses for pain 8-9/10 in back.  Pt also c/o tingling and burning to B-hands and B-feet, but back pain was more irritating for pt.

## 2023-05-12 LAB
ANION GAP SERPL CALCULATED.3IONS-SCNC: 12 MMOL/L (ref 7–15)
ATRIAL RATE - MUSE: 89 BPM
BUN SERPL-MCNC: 15.8 MG/DL (ref 8–23)
CALCIUM SERPL-MCNC: 8.2 MG/DL (ref 8.8–10.2)
CHLORIDE SERPL-SCNC: 91 MMOL/L (ref 98–107)
CREAT SERPL-MCNC: 0.55 MG/DL (ref 0.67–1.17)
DEPRECATED HCO3 PLAS-SCNC: 20 MMOL/L (ref 22–29)
DIASTOLIC BLOOD PRESSURE - MUSE: NORMAL MMHG
GFR SERPL CREATININE-BSD FRML MDRD: >90 ML/MIN/1.73M2
GLUCOSE BLDC GLUCOMTR-MCNC: 170 MG/DL (ref 70–99)
GLUCOSE BLDC GLUCOMTR-MCNC: 199 MG/DL (ref 70–99)
GLUCOSE BLDC GLUCOMTR-MCNC: 272 MG/DL (ref 70–99)
GLUCOSE BLDC GLUCOMTR-MCNC: 278 MG/DL (ref 70–99)
GLUCOSE SERPL-MCNC: 137 MG/DL (ref 70–99)
INTERPRETATION ECG - MUSE: NORMAL
OSMOLALITY UR: 668 MMOL/KG (ref 100–1200)
P AXIS - MUSE: 33 DEGREES
POTASSIUM SERPL-SCNC: 3.8 MMOL/L (ref 3.4–5.3)
PR INTERVAL - MUSE: 230 MS
QRS DURATION - MUSE: 88 MS
QT - MUSE: 378 MS
QTC - MUSE: 459 MS
R AXIS - MUSE: -48 DEGREES
SODIUM SERPL-SCNC: 118 MMOL/L (ref 136–145)
SODIUM SERPL-SCNC: 122 MMOL/L (ref 136–145)
SODIUM SERPL-SCNC: 122 MMOL/L (ref 136–145)
SODIUM SERPL-SCNC: 123 MMOL/L (ref 136–145)
SODIUM SERPL-SCNC: 132 MMOL/L (ref 136–145)
SODIUM UR-SCNC: 44 MMOL/L
SYSTOLIC BLOOD PRESSURE - MUSE: NORMAL MMHG
T AXIS - MUSE: 16 DEGREES
VENTRICULAR RATE- MUSE: 89 BPM

## 2023-05-12 PROCEDURE — 258N000003 HC RX IP 258 OP 636: Performed by: INTERNAL MEDICINE

## 2023-05-12 PROCEDURE — 84295 ASSAY OF SERUM SODIUM: CPT | Performed by: INTERNAL MEDICINE

## 2023-05-12 PROCEDURE — 250N000013 HC RX MED GY IP 250 OP 250 PS 637: Performed by: STUDENT IN AN ORGANIZED HEALTH CARE EDUCATION/TRAINING PROGRAM

## 2023-05-12 PROCEDURE — 99232 SBSQ HOSP IP/OBS MODERATE 35: CPT | Performed by: STUDENT IN AN ORGANIZED HEALTH CARE EDUCATION/TRAINING PROGRAM

## 2023-05-12 PROCEDURE — 99232 SBSQ HOSP IP/OBS MODERATE 35: CPT | Mod: GC | Performed by: INTERNAL MEDICINE

## 2023-05-12 PROCEDURE — 250N000011 HC RX IP 250 OP 636: Performed by: STUDENT IN AN ORGANIZED HEALTH CARE EDUCATION/TRAINING PROGRAM

## 2023-05-12 PROCEDURE — 200N000001 HC R&B ICU

## 2023-05-12 PROCEDURE — 80048 BASIC METABOLIC PNL TOTAL CA: CPT | Performed by: INTERNAL MEDICINE

## 2023-05-12 PROCEDURE — 84300 ASSAY OF URINE SODIUM: CPT | Performed by: INTERNAL MEDICINE

## 2023-05-12 PROCEDURE — 83935 ASSAY OF URINE OSMOLALITY: CPT | Performed by: INTERNAL MEDICINE

## 2023-05-12 PROCEDURE — 250N000013 HC RX MED GY IP 250 OP 250 PS 637: Performed by: INTERNAL MEDICINE

## 2023-05-12 PROCEDURE — 99207 PR CDG-CUT & PASTE-POTENTIAL IMPACT ON LEVEL: CPT | Performed by: STUDENT IN AN ORGANIZED HEALTH CARE EDUCATION/TRAINING PROGRAM

## 2023-05-12 RX ORDER — AMOXICILLIN 250 MG
1 CAPSULE ORAL 2 TIMES DAILY
Status: DISCONTINUED | OUTPATIENT
Start: 2023-05-12 | End: 2023-05-16 | Stop reason: HOSPADM

## 2023-05-12 RX ORDER — GABAPENTIN 100 MG/1
200 CAPSULE ORAL 2 TIMES DAILY
Status: DISCONTINUED | OUTPATIENT
Start: 2023-05-12 | End: 2023-05-14

## 2023-05-12 RX ORDER — SODIUM CHLORIDE 1 G/1
2 TABLET ORAL 2 TIMES DAILY WITH MEALS
Status: DISCONTINUED | OUTPATIENT
Start: 2023-05-12 | End: 2023-05-16 | Stop reason: HOSPADM

## 2023-05-12 RX ORDER — SODIUM CHLORIDE 9 MG/ML
INJECTION, SOLUTION INTRAVENOUS CONTINUOUS
Status: DISCONTINUED | OUTPATIENT
Start: 2023-05-12 | End: 2023-05-14

## 2023-05-12 RX ORDER — CARVEDILOL 25 MG/1
50 TABLET ORAL 2 TIMES DAILY WITH MEALS
Status: DISCONTINUED | OUTPATIENT
Start: 2023-05-12 | End: 2023-05-16 | Stop reason: HOSPADM

## 2023-05-12 RX ORDER — FUROSEMIDE 20 MG
20 TABLET ORAL
Status: DISCONTINUED | OUTPATIENT
Start: 2023-05-12 | End: 2023-05-16 | Stop reason: HOSPADM

## 2023-05-12 RX ADMIN — CARVEDILOL 50 MG: 25 TABLET, FILM COATED ORAL at 16:14

## 2023-05-12 RX ADMIN — DILTIAZEM HYDROCHLORIDE 240 MG: 120 CAPSULE, COATED, EXTENDED RELEASE ORAL at 08:19

## 2023-05-12 RX ADMIN — INSULIN ASPART 6 UNITS: 100 INJECTION, SOLUTION INTRAVENOUS; SUBCUTANEOUS at 11:22

## 2023-05-12 RX ADMIN — GABAPENTIN 200 MG: 100 CAPSULE ORAL at 20:07

## 2023-05-12 RX ADMIN — INSULIN ASPART 3 UNITS: 100 INJECTION, SOLUTION INTRAVENOUS; SUBCUTANEOUS at 17:03

## 2023-05-12 RX ADMIN — LISINOPRIL 40 MG: 20 TABLET ORAL at 08:20

## 2023-05-12 RX ADMIN — SODIUM CHLORIDE: 9 INJECTION, SOLUTION INTRAVENOUS at 12:13

## 2023-05-12 RX ADMIN — DOCUSATE SODIUM 50 MG AND SENNOSIDES 8.6 MG 1 TABLET: 8.6; 5 TABLET, FILM COATED ORAL at 20:07

## 2023-05-12 RX ADMIN — ATORVASTATIN CALCIUM 40 MG: 40 TABLET, FILM COATED ORAL at 08:18

## 2023-05-12 RX ADMIN — SODIUM CHLORIDE TAB 1 GM 2 G: 1 TAB at 16:15

## 2023-05-12 RX ADMIN — SODIUM CHLORIDE TAB 1 GM 1 G: 1 TAB at 08:20

## 2023-05-12 RX ADMIN — INSULIN ASPART 2 UNITS: 100 INJECTION, SOLUTION INTRAVENOUS; SUBCUTANEOUS at 08:14

## 2023-05-12 RX ADMIN — FUROSEMIDE 20 MG: 20 TABLET ORAL at 17:05

## 2023-05-12 RX ADMIN — ENOXAPARIN SODIUM 40 MG: 40 INJECTION SUBCUTANEOUS at 16:15

## 2023-05-12 RX ADMIN — GABAPENTIN 300 MG: 300 CAPSULE ORAL at 08:19

## 2023-05-12 RX ADMIN — DOCUSATE SODIUM 50 MG AND SENNOSIDES 8.6 MG 1 TABLET: 8.6; 5 TABLET, FILM COATED ORAL at 11:23

## 2023-05-12 RX ADMIN — FUROSEMIDE 20 MG: 20 TABLET ORAL at 08:19

## 2023-05-12 RX ADMIN — CARVEDILOL 25 MG: 12.5 TABLET, FILM COATED ORAL at 08:18

## 2023-05-12 RX ADMIN — ASPIRIN 81 MG: 81 TABLET, COATED ORAL at 08:18

## 2023-05-12 RX ADMIN — SODIUM CHLORIDE TAB 1 GM 1 G: 1 TAB at 11:25

## 2023-05-12 ASSESSMENT — ACTIVITIES OF DAILY LIVING (ADL)
ADLS_ACUITY_SCORE: 44

## 2023-05-12 NOTE — PROGRESS NOTES
[facility]    Medicine Progress Note - Hospitalist Service    Date of Admission:  5/9/2023    Assessment & Plan   Vashti King is a 77 year old male admitted on 5/9/2023. He has a history of hypertension, diabetes, BPH, CKD presented with nausea and vomiting.  Patient was seen a couple days ago in the ED for burning sensation of the hands and feet and he was started on gabapentin on 5/7/2023.  He stated that he started to have this nausea vomiting after starting the medication.  Per the son at the bedside, patient does not have mental status changes or seizure activity.  Otherwise she denies abdominal pain, urinary or bowel habit changes, fever or chills, no cough shortness of breath, chest pain, or bilateral leg swelling. In the ED, sodium was 120 down from 136 three days prior.     Acute hyponatremia  -In the setting of HCTZ use and nausea and vomiting  -Transferred to ICU on 5/10 and on hypertonic saline per nephrology management  -Sodium checks and management per nephrology  -Sodium fluctuating quite a bit still, will keep in ICU incase hypertonic needed, anticipate downgrade tomorrow  -PICC line placed on 5/10  -No neurological changes, patient conversating with family and using bedside commode  -Advised patient and family members to not bring any outside food or drinks (he has multiple drinks around his bedside)    Essential hypertension  -HCTZ held due to hyponatremia  -Continue with lisinopril 40 mg , diltiazem 240mg  -Stop Metoprolol and transition to carvedilol 25 BID-->50 BID today  -Hydralazine PRN      Diabetes mellitus II  -Increase lantus to 18 units at bedtime, continue meal time novolog and increase SSI  -Accu-Cheks, sliding scale, lantus  -Hemoglobin A1c 7.9    Polyneuropathy-likely secondary to diabetes mellitus  -Continue with gabapentin 300 BID today- seems to help but concern for drowsiness so will change to 200 BID    H/o CKD stage IIIa  - cr at admission 0.78  "(baseline 1.1-1.4)  -Monitor BMP, I/O    Hyperlipidemia  -Continue with PTA statin    Updated son at bedside        Diet: Combination Diet Moderate Consistent Carb (60 g CHO per Meal) Diet  Fluid restriction 1200 ML FLUID (avoid anusha ice and water)    DVT Prophylaxis: Enoxaparin (Lovenox) SQ  [place]da[place] Catheter: Not present  Lines: PRESENT      PICC 05/10/23 Double Lumen Left Basilic Vascular Access-Site Assessment: WDL;Ecchymotic      Cardiac Monitoring: None  Code Status: Full Code      Clinically Significant Risk Factors         # Hyponatremia: Lowest Na = 112 mmol/L in last 2 days, will monitor as appropriate                # DMII: A1C = 7.9 % (Ref range: <5.7 %) within past 6 months, PRESENT ON ADMISSION  # Overweight: Estimated body mass index is 29.95 kg/m  as calculated from the following:    Height as of this encounter: 1.651 m (5' 5\").    Weight as of this encounter: 81.6 kg (180 lb)., PRESENT ON ADMISSION         Disposition Plan     Expected Discharge Date: 05/12/2023                  Griselda Cote MD  Hospitalist Service  [facility]  Securely message with [employer] (more info)  Text page via [employer] Paging/Directory   ______________________________________________________________________    Interval History   Denies any symptoms, asking for food    Physical Exam   Vital Signs: Temp: (!) 49.3  F (9.6  C) Temp src: Oral BP: (!) 147/77 Pulse: 69   Resp: 19 SpO2: 95 % O2 Device: None (Room air)    Weight: 180 lbs 0 oz    Physical Exam  Constitutional:       General: He is not in acute distress.     Appearance: He is obese. He is not toxic-appearing.   HENT:      Head: Normocephalic and atraumatic.      Mouth/Throat:      Mouth: Mucous membranes are moist.   Cardiovascular:      Rate and Rhythm: Normal rate and regular rhythm.   Abdominal:      Palpations: Abdomen is soft.      Tenderness: There is no abdominal tenderness. There is no guarding or rebound.   Musculoskeletal:         General: " No swelling.   Neurological:      General: No focal deficit present.      Mental Status: He is alert and oriented to person, place, and time.       Medical Decision Making       35 MINUTES SPENT BY ME on the date of service doing chart review, history, exam, documentation & further activities per the note.      Data     I have personally reviewed the following data over the past 24 hrs:    N/A  \   N/A   / N/A     122 (L) 91 (L) 15.8 /  278 (H)   3.8 20 (L) 0.55 (L) \       Imaging results reviewed over the past 24 hrs:   No results found for this or any previous visit (from the past 24 hour(s)).

## 2023-05-12 NOTE — PROGRESS NOTES
RENAL  NOTE     REQUESTING PHYSICIAN: Dr. Torres    REASON FOR CONSULT: Hyponatremia     ASSESSMENT/PLAN:    Vashti King is a 76 yo M with PMH significant for hypertension, CKD 3a, type 2 diabetes,  who presented to [facility] ED w/complaint of polyneuropathy of his hands and feet; he was started on gabapentin 5/7/2023 and since that time developed nausea and vomiting. Na+ was noted to be down acutely to 120 (from 136 on 5/6/2023) and nephrology was consulted.     Acute hyponatremia  Baseline normonatremic, including at ED visit 5/6/2023 when Na+ was 136. Acutely down to 120 5/9/23 in the setting of uncontrolled pain, nausea, and hydrochlorothiazide use, NSAID x 1 since admit. Likely a component of excess ADH exacerbated by thiazide use, supported by Osm studies (urine Osm 822, serum Osm 262, Urine Na 43 in the setting of thiazide diuretic). TSH WNL 5/6/2023. Some elevated serum ketones but does not appear to be in DKA and BG's <200; Na+ correction for hyperglycemia is negligible. 3% was resumed yesterday, however overnight sodium overcorrected to 132. 3% was stopped and recheck was 123.     Recs:   Discontinue 3% 20ml/hr  Continue Na Cl tabs 1 gr tid  Increase Lasix to 40 mg/day po to decrease free water/dilute urine.   Long term avoid hydrochlorothiazide and NSAIDS  - Na q4 hours til Na stable in 120'2  - ongoing use of anti-emetics and pain control but avoid further NSAID use as these can worsen hyponatremia     Polyneuropathy  Long-standing history of DM in stocking/glove distribution makes diabetic neuropathy likely. Ok to continue gabapentin from nephrology standpoint. Pain control will be important in regulating excess ADH.   -Consider decreasing gabapentin, pt felt drowsy this am after morning dose.     Constipation defer to primary service.     Hypertension  BP elevated. PTA hydrochlorothiazide on hold with hyponatremia. Metoprolol XL increased to 100 mg daily, maintain lisinopril 40 mg, diltiazem ER  240 mg.  PRNS ordered.   -Lasix increased today, he will likely need additional BP med, could increase metoprolol or change to coreg.     Type 2 diabetes  Elevated ketones, BG's here </=200. Na+ correction for hyperglycemia is negligible. Management per primary service.     CKD 3a  Baseline Cr 1.1-1.4, eGFR 55-60+. Currently Cr improved from baseline. He does not follow with nephrology regularly and could be seen in our clinic after discharge if he desired.     Hyperlipidemia  on statin     HPI:    Overnight Sodium overcorrected to 132 from 121, so 3%Saline  was stopped. Recheck was 123, and has remained stable since.     Today pt denies any new concerns, appears baseline mentation. Family is present at bedside. He does note that he felt sleepy this morning after breakfast, at which time he also had his morning gabapentin. He continues to have complaint of polyneuropathy. He denies N/V today. He has been producing urine.       REVIEW OF SYSTEMS:  Comprehensive ROS negative except per HPI     ALLERGIES/SENSITIVITIES:  Allergies   Allergen Reactions     Amlodipine Swelling     Swelling in legs      Social History     Tobacco Use     Smoking status: Never     Smokeless tobacco: Never   Substance Use Topics     Drug use: No     I have reviewed this patient's family history and updated it with pertinent information if needed.  Family History   Problem Relation Age of Onset     Diabetes Sister      Hypertension Sister      Diabetes Brother      Hypertension Brother      Cerebrovascular Disease Brother          PHYSICAL EXAM:  Physical Exam   Temp: 97.9  F (36.6  C) Temp src: Oral BP: (!) 158/92 Pulse: 83   Resp: 22 SpO2: 95 % O2 Device: None (Room air)    Vitals:    05/09/23 0752   Weight: 81.6 kg (180 lb)     Vital Signs with Ranges  Temp:  [97.4  F (36.3  C)-98  F (36.7  C)] 97.9  F (36.6  C)  Pulse:  [64-83] 83  Resp:  [18-25] 22  BP: (125-171)/() 158/92  SpO2:  [95 %-97 %] 95 %  I/O last 3 completed shifts:  In:  1302.67 [P.O.:1160; I.V.:142.67]  Out: 2600 [Urine:2600]    @TMAXR(24)@    No data found.    General - A & O x 3, NAD   HEENT - Moist mucous membranes.   Neck - no JVD  Respiratory - Lungs CTA bilat without crackles  Cardiovascular - AP RRR with murmur  Abdomen - soft, BS present, non tender.   Extremities - well perfused, no edema  Integumentary - intact, good turgor, no rash/lesions  Neurologic - grossly intact  Psych:  Judgement intact, affect WNL    Laboratory:     Recent Labs   Lab 05/10/23  0529 05/09/23  0847 05/06/23  1856   WBC 8.1 7.6 7.0   RBC 5.11 5.48 5.39   HGB 15.3 16.8 16.5   HCT 41.5 45.2 46.2    273 259       Basic Metabolic Panel:  Recent Labs   Lab 05/12/23  1130 05/12/23  1120 05/12/23  0833 05/12/23  0746 05/12/23  0441 05/11/23  2357 05/11/23  2104 05/11/23  2020 05/11/23 2011 05/11/23  1630 05/11/23  1623 05/10/23  2221 05/10/23  2018 05/10/23  1819 05/10/23  1651 05/10/23  1546 05/10/23  1205 05/10/23  1004 05/10/23  0812 05/10/23  0529   *  --  123*  --  123*  123* 132*  --  121*  --  119*  --    < > 113* 112*  --  112*   < > 111*  --  117*   POTASSIUM  --   --   --   --  3.8  --   --   --   --   --   --   --  3.9 4.2  --  4.0  --  3.9  --  3.7   CHLORIDE  --   --   --   --  91*  --   --   --   --   --   --   --  80* 79*  --  78*  --  78*  --  79*   CO2  --   --   --   --  20*  --   --   --   --   --   --   --  19* 19*  --  21*  --  18*  --  22   BUN  --   --   --   --  15.8  --   --   --   --   --   --   --  18.3 18.3  --  17.9  --  18.4  --  18.6   CR  --   --   --   --  0.55*  --   --   --   --   --   --   --  0.71 0.66*  --  0.68  --  0.68  --  0.81   GLC  --  278*  --  170* 137*  --  179*  --  187*  --  194*   < > 170* 214*   < > 146*   < > 166*   < > 134*   HELEN  --   --   --   --  8.2*  --   --   --   --   --   --   --  8.6* 8.5*  --  8.8  --  8.9  --  8.7*    < > = values in this interval not displayed.       INRNo lab results found in last 7 days.    Recent Labs   Lab  Test 05/09/23  0847 05/06/23  1856   POTASSIUM 4.0 4.4   CHLORIDE 81* 96*   BUN 15.5 16.1      Recent Labs   Lab Test 05/09/23  1155 05/09/23  0847 12/06/22  0951 08/30/22  1010   ALBUMIN  --  4.5 4.5  --    BILITOTAL  --  1.6* 0.7  --    ALT  --  21 23  --    AST  --  29 39  --    PROTEIN 20*  --   --  Negative       Personally reviewed today's laboratory studies      Thank you for involving us in the care of this patient. We will continue to follow along with you.    Aki Weeks DO  [facility] Medicine Resident PGY-3  Pager: [phone]    Discussed patient with:  Ameena Hays MD  [facility]  [phone]

## 2023-05-12 NOTE — PLAN OF CARE
Goal Outcome Evaluation:         [facility] - ICU    RN Progress Note:            Pertinent Assessments:      Please refer to flowsheet rows for full assessment     Pt was stable through shift. Pt Sodium remained above 22 the last 2 checks, 3% Sodium on hold. Plan of care to be continued and downgrade from ICU to be considered.           Key Events - This Shift:     3% Sodium on hold.             Barriers to Discharge / Downgrade:     None         Point of Contact Update YES-OR-NO: Yes  If No, reason: N/A  Name:Son  Phone Number:N/A  Summary of Conversation: Update given at bedside

## 2023-05-12 NOTE — PLAN OF CARE
[facility] - ICU    RN Progress Note:            Pertinent Assessments:      Please refer to flowsheet rows for full assessment     VSS, afebrile. Denies pain. Up w/ assist x1 & gait belt. Using bedside commode; c/o constipation, bowel Rx added. Remains on q4 Na+ checks; NS @ 100mL/h started. See flowsheets and MAR for complete assessment.           Key Events - This Shift:       Poor sleep; would like evening melatonin.              Barriers to Discharge / Downgrade:     Frequent labs to monitor Na+

## 2023-05-12 NOTE — PLAN OF CARE
Problem: Electrolyte Imbalance  Goal: Electrolyte Imbalance: Plan of Care  Outcome: Progressing   Goal Outcome Evaluation:     [facility] - ICU    RN Progress Note:            Pertinent Assessments:      Please refer to flowsheet rows for full assessment     Pt is alert and oriented x4, follows commands, and calls appropriately. Denies pain. No BM on this shift. Prn bowel med given. Voiding x1.            Key Events - This Shift:       -Sb=262, recheck at midnight.           Barriers to Discharge / Downgrade:     Low Na,and Na infusing.

## 2023-05-13 ENCOUNTER — APPOINTMENT (OUTPATIENT)
Dept: PHYSICAL THERAPY | Facility: HOSPITAL | Age: 78
DRG: 644 | End: 2023-05-13
Attending: STUDENT IN AN ORGANIZED HEALTH CARE EDUCATION/TRAINING PROGRAM
Payer: MEDICARE

## 2023-05-13 LAB
GLUCOSE BLDC GLUCOMTR-MCNC: 146 MG/DL (ref 70–99)
GLUCOSE BLDC GLUCOMTR-MCNC: 150 MG/DL (ref 70–99)
GLUCOSE BLDC GLUCOMTR-MCNC: 190 MG/DL (ref 70–99)
GLUCOSE BLDC GLUCOMTR-MCNC: 237 MG/DL (ref 70–99)
SODIUM SERPL-SCNC: 126 MMOL/L (ref 136–145)
SODIUM SERPL-SCNC: 126 MMOL/L (ref 136–145)
SODIUM SERPL-SCNC: 127 MMOL/L (ref 136–145)

## 2023-05-13 PROCEDURE — 258N000003 HC RX IP 258 OP 636: Performed by: INTERNAL MEDICINE

## 2023-05-13 PROCEDURE — 250N000012 HC RX MED GY IP 250 OP 636 PS 637: Performed by: STUDENT IN AN ORGANIZED HEALTH CARE EDUCATION/TRAINING PROGRAM

## 2023-05-13 PROCEDURE — 250N000013 HC RX MED GY IP 250 OP 250 PS 637: Performed by: INTERNAL MEDICINE

## 2023-05-13 PROCEDURE — 250N000013 HC RX MED GY IP 250 OP 250 PS 637: Performed by: STUDENT IN AN ORGANIZED HEALTH CARE EDUCATION/TRAINING PROGRAM

## 2023-05-13 PROCEDURE — 97161 PT EVAL LOW COMPLEX 20 MIN: CPT | Mod: GP

## 2023-05-13 PROCEDURE — 99207 PR CDG-CUT & PASTE-POTENTIAL IMPACT ON LEVEL: CPT | Performed by: STUDENT IN AN ORGANIZED HEALTH CARE EDUCATION/TRAINING PROGRAM

## 2023-05-13 PROCEDURE — 84295 ASSAY OF SERUM SODIUM: CPT | Performed by: INTERNAL MEDICINE

## 2023-05-13 PROCEDURE — 97116 GAIT TRAINING THERAPY: CPT | Mod: GP

## 2023-05-13 PROCEDURE — 99232 SBSQ HOSP IP/OBS MODERATE 35: CPT | Performed by: INTERNAL MEDICINE

## 2023-05-13 PROCEDURE — 250N000011 HC RX IP 250 OP 636: Performed by: STUDENT IN AN ORGANIZED HEALTH CARE EDUCATION/TRAINING PROGRAM

## 2023-05-13 PROCEDURE — 120N000001 HC R&B MED SURG/OB

## 2023-05-13 PROCEDURE — 99231 SBSQ HOSP IP/OBS SF/LOW 25: CPT | Performed by: STUDENT IN AN ORGANIZED HEALTH CARE EDUCATION/TRAINING PROGRAM

## 2023-05-13 PROCEDURE — 97110 THERAPEUTIC EXERCISES: CPT | Mod: GP

## 2023-05-13 RX ADMIN — Medication 1 MG: at 00:26

## 2023-05-13 RX ADMIN — CARVEDILOL 50 MG: 25 TABLET, FILM COATED ORAL at 18:13

## 2023-05-13 RX ADMIN — FUROSEMIDE 20 MG: 20 TABLET ORAL at 18:13

## 2023-05-13 RX ADMIN — SODIUM CHLORIDE 100 ML/HR: 9 INJECTION, SOLUTION INTRAVENOUS at 11:41

## 2023-05-13 RX ADMIN — SODIUM CHLORIDE TAB 1 GM 2 G: 1 TAB at 18:13

## 2023-05-13 RX ADMIN — SODIUM CHLORIDE TAB 1 GM 2 G: 1 TAB at 08:17

## 2023-05-13 RX ADMIN — GABAPENTIN 200 MG: 100 CAPSULE ORAL at 21:52

## 2023-05-13 RX ADMIN — FUROSEMIDE 20 MG: 20 TABLET ORAL at 08:16

## 2023-05-13 RX ADMIN — DOCUSATE SODIUM 50 MG AND SENNOSIDES 8.6 MG 1 TABLET: 8.6; 5 TABLET, FILM COATED ORAL at 21:52

## 2023-05-13 RX ADMIN — INSULIN ASPART 3 UNITS: 100 INJECTION, SOLUTION INTRAVENOUS; SUBCUTANEOUS at 11:06

## 2023-05-13 RX ADMIN — LISINOPRIL 40 MG: 20 TABLET ORAL at 08:17

## 2023-05-13 RX ADMIN — INSULIN ASPART 4 UNITS: 100 INJECTION, SOLUTION INTRAVENOUS; SUBCUTANEOUS at 18:11

## 2023-05-13 RX ADMIN — INSULIN ASPART 1 UNITS: 100 INJECTION, SOLUTION INTRAVENOUS; SUBCUTANEOUS at 07:13

## 2023-05-13 RX ADMIN — SODIUM CHLORIDE: 9 INJECTION, SOLUTION INTRAVENOUS at 00:27

## 2023-05-13 RX ADMIN — GABAPENTIN 200 MG: 100 CAPSULE ORAL at 08:16

## 2023-05-13 RX ADMIN — SODIUM CHLORIDE: 9 INJECTION, SOLUTION INTRAVENOUS at 21:58

## 2023-05-13 RX ADMIN — ATORVASTATIN CALCIUM 40 MG: 40 TABLET, FILM COATED ORAL at 08:16

## 2023-05-13 RX ADMIN — ASPIRIN 81 MG: 81 TABLET, COATED ORAL at 08:16

## 2023-05-13 RX ADMIN — ENOXAPARIN SODIUM 40 MG: 40 INJECTION SUBCUTANEOUS at 18:15

## 2023-05-13 RX ADMIN — DILTIAZEM HYDROCHLORIDE 240 MG: 120 CAPSULE, COATED, EXTENDED RELEASE ORAL at 08:17

## 2023-05-13 RX ADMIN — CARVEDILOL 50 MG: 25 TABLET, FILM COATED ORAL at 08:17

## 2023-05-13 ASSESSMENT — ACTIVITIES OF DAILY LIVING (ADL)
ADLS_ACUITY_SCORE: 40
ADLS_ACUITY_SCORE: 45
ADLS_ACUITY_SCORE: 43
ADLS_ACUITY_SCORE: 40
ADLS_ACUITY_SCORE: 43
ADLS_ACUITY_SCORE: 44
ADLS_ACUITY_SCORE: 43
ADLS_ACUITY_SCORE: 44
ADLS_ACUITY_SCORE: 40
ADLS_ACUITY_SCORE: 40

## 2023-05-13 NOTE — PROGRESS NOTES
RENAL  NOTE     REQUESTING PHYSICIAN: Dr. Torres    REASON FOR CONSULT: Hyponatremia     ASSESSMENT/PLAN:  Acute hyponatremia - Baseline normonatremic, including at ED visit 5/6/2023 when Na+ was 136.   Acutely down to 120 5/9/23 in the setting of uncontrolled pain, nausea, and hydrochlorothiazide use, NSAID x 1 since admit. Likely due to excess ADH exacerbated by thiazide use, supported by Osm studies (urine Osm 822, serum Osm 262, Urine Na 43 in the setting of thiazide diuretic). TSH WNL 5/6/2023.    Now Na improving, off 3% saline since yesterday am  On NS with bid lasix and NaCl tabs and Na up to 126. Change Na checks to q8 hours and will try to get off NS IVF by tomorrow, then observe at least day inpt on all oral management to ensure stable before discharge.   Permanently avoid thiazide diuretics and NSAIDS in future.     Polyneuropathy - Long-standing history of DM in stocking/glove distribution makes diabetic neuropathy likely.  increased gabapentin 300mg bid and pain seems better controlled     Constipation defer to primary service.     Hypertension - BP elevated here. Now on carvedilol,  lisinopril 40 mg, diltiazem  mg.  Now well controlled.      Type 2 diabetes - Elevated ketones, BG's here </=200. Na+ correction for hyperglycemia is negligible. Management per primary service.     CKD 3a - Baseline Cr 1.1-1.4, eGFR 55-60+. Currently Cr improved from baseline. He does not follow with nephrology regularly and could be seen in our clinic after discharge if needed.   Creat much better now ?due to better hydration in hosp (+IVF and off hydrochlorothiazide?)    Hyperlipidemia - on statin     HPI: Vashti King is a 78 yo M with PMH significant for hypertension, CKD 3a, type 2 diabetes,  who presented to [facility] ED for the third time this week with complaint of polyneuropathy of his hands and feet; he was started on gabapentin 5/7/2023 and since that time developed nausea and vomiting. Na+ was  noted to be down acutely to 120 (from 136 on 5/6/2023) and nephrology was consulted.     Son here, pt slept ok, eating all on tray and just worked with PT who is currently rec TCU though family hopes he'll improve and be able to avoid.   He's asleep now. Reviewed Na.     REVIEW OF SYSTEMS:  Comprehensive ROS negative except per HPI     ALLERGIES/SENSITIVITIES:  Allergies   Allergen Reactions     Amlodipine Swelling     Swelling in legs      Social History     Tobacco Use     Smoking status: Never     Smokeless tobacco: Never   Substance Use Topics     Drug use: No     I have reviewed this patient's family history and updated it with pertinent information if needed.  Family History   Problem Relation Age of Onset     Diabetes Sister      Hypertension Sister      Diabetes Brother      Hypertension Brother      Cerebrovascular Disease Brother          PHYSICAL EXAM:  Physical Exam   Temp: 97.8  F (36.6  C) Temp src: Oral BP: 118/72 Pulse: 70   Resp: 23 SpO2: 97 % O2 Device: None (Room air)    Vitals:    05/09/23 0752 05/13/23 0300   Weight: 81.6 kg (180 lb) 81.4 kg (179 lb 7.3 oz)     Vital Signs with Ranges  Temp:  [97.5  F (36.4  C)-98  F (36.7  C)] 97.8  F (36.6  C)  Pulse:  [61-83] 70  Resp:  [18-51] 23  BP: ()/(61-94) 118/72  SpO2:  [94 %-97 %] 97 %  I/O last 3 completed shifts:  In: 2839 [P.O.:1040; I.V.:1799]  Out: 1955 [Urine:1955]    @TMAXR(24)@    Patient Vitals for the past 72 hrs:   Weight   05/13/23 0300 81.4 kg (179 lb 7.3 oz)       General - asleep  HEENT - AT NC mmm  Neck - no JVD  Respiratory - Lungs CTA bilat without crackles  Cardiovascular - AP RRR  Abdomen - soft,  non tender.   Extremities - well perfused, no edema  Integumentary - intact, good turgor, no rash/lesions  Neurologic -asleep    Laboratory:     Recent Labs   Lab 05/10/23  0529 05/09/23  0847 05/06/23  1856   WBC 8.1 7.6 7.0   RBC 5.11 5.48 5.39   HGB 15.3 16.8 16.5   HCT 41.5 45.2 46.2    273 259       Basic Metabolic  Panel:  Recent Labs   Lab 05/13/23  1104 05/13/23  0711 05/13/23  0610 05/13/23  0023 05/12/23 2003 05/12/23 2002 05/12/23  1657 05/12/23  1607 05/12/23  1130 05/12/23  1120 05/12/23  0833 05/12/23  0746 05/12/23  0441 05/10/23  2221 05/10/23  2018 05/10/23  1819 05/10/23  1651 05/10/23  1546 05/10/23  1205 05/10/23  1004 05/10/23  0812 05/10/23  0529   NA  --   --  126* 127*  --  122*  --  122* 118*  --  123*  --  123*  123*   < > 113* 112*  --  112*   < > 111*  --  117*   POTASSIUM  --   --   --   --   --   --   --   --   --   --   --   --  3.8  --  3.9 4.2  --  4.0  --  3.9  --  3.7   CHLORIDE  --   --   --   --   --   --   --   --   --   --   --   --  91*  --  80* 79*  --  78*  --  78*  --  79*   CO2  --   --   --   --   --   --   --   --   --   --   --   --  20*  --  19* 19*  --  21*  --  18*  --  22   BUN  --   --   --   --   --   --   --   --   --   --   --   --  15.8  --  18.3 18.3  --  17.9  --  18.4  --  18.6   CR  --   --   --   --   --   --   --   --   --   --   --   --  0.55*  --  0.71 0.66*  --  0.68  --  0.68  --  0.81   * 146*  --   --  272*  --  199*  --   --  278*  --  170* 137*   < > 170* 214*   < > 146*   < > 166*   < > 134*   HELEN  --   --   --   --   --   --   --   --   --   --   --   --  8.2*  --  8.6* 8.5*  --  8.8  --  8.9  --  8.7*    < > = values in this interval not displayed.       INRNo lab results found in last 7 days.    Recent Labs   Lab Test 05/09/23  0847 05/06/23  1856   POTASSIUM 4.0 4.4   CHLORIDE 81* 96*   BUN 15.5 16.1      Recent Labs   Lab Test 05/09/23  1155 05/09/23  0847 12/06/22  0951 08/30/22  1010   ALBUMIN  --  4.5 4.5  --    BILITOTAL  --  1.6* 0.7  --    ALT  --  21 23  --    AST  --  29 39  --    PROTEIN 20*  --   --  Negative       Personally reviewed today's laboratory studies      Thank you for involving us in the care of this patient. We will continue to follow along with you.    Ameena Hays MD  [facility]  [phone]

## 2023-05-13 NOTE — PLAN OF CARE
[facility] - ICU    RN Progress Note:            Pertinent Assessments:           VSS; afebrile. Denies pain. Up to commode w/ assist x2, gait belt and walker 2/2 weakness & unsteady gait (see PT/OT eval). Very large BM x1, pt refused further bowel meds. Tolerating 60g diabetic diet, 1200 mL fluid restriction. Downgraded to Gen Med. See flowsheets and MAR for complete assessment.           Key Events - This Shift:       Downgraded from ICU status.             Barriers to Discharge / Downgrade:     Weakness/mobility.

## 2023-05-13 NOTE — PROGRESS NOTES
05/13/23 1010   Appointment Info   Signing Clinician's Name / Credentials (PT) Fadumo Valerio DPT       Present no   Language Hmong, family interprets   Living Environment   People in Home spouse   Current Living Arrangements house   Home Accessibility stairs to enter home   Number of Stairs, Main Entrance 6   Stair Railings, Main Entrance railing on left side (ascending)   Self-Care   Usual Activity Tolerance moderate   Current Activity Tolerance fair   Equipment Currently Used at Home cane, straight  (may have 4WW vs FWW at home\)   Activity/Exercise/Self-Care Comment normally ind w/ ADLs, spouse does IADLs   General Information   Onset of Illness/Injury or Date of Surgery 05/09/23   Referring Physician Griselda Cote MD   Patient/Family Therapy Goals Statement (PT) wanting to d/c to home   Pertinent History of Current Problem (include personal factors and/or comorbidities that impact the POC) Vashti King is a 77 year old male admitted on 5/9/2023. He has a history of hypertension, diabetes, BPH, CKD presented with nausea and vomiting.  Patient was seen a couple days ago in the ED for burning sensation of the hands and feet and he was started on gabapentin on 5/7/2023.   Pain Assessment   Patient Currently in Pain No   Range of Motion (ROM)   Range of Motion ROM is WFL   Strength (Manual Muscle Testing)   Strength (Manual Muscle Testing) Deficits observed during functional mobility   Bed Mobility   Bed Mobility sit-supine   Sit-Supine Dalton (Bed Mobility) moderate assist (50% patient effort);2 person assist   Impairments Contributing to Impaired Bed Mobility decreased strength  (decreased endurance)   Assistive Device (Bed Mobility) draw sheet   Transfers   Transfers sit-stand transfer   Sit-Stand Transfer   Sit-Stand Dalton (Transfers) minimum assist (75% patient effort)   Assistive Device (Sit-Stand Transfers) walker, front-wheeled   Comment, (Sit-Stand Transfer) A x  1-2   Gait/Stairs (Locomotion)   Adona Level (Gait) moderate assist (50% patient effort);1 person to manage equipment;2 person assist  (A x 1 plus 1 for w/c follow)   Assistive Device (Gait) walker, front-wheeled   Distance in Feet 5'   Distance in Feet (Gait) 5'   Pattern (Gait) step-to   Deviations/Abnormal Patterns (Gait) left sided deviations   Comment, (Gait/Stairs) stairs not completed at this time d/t weakness and decreased endurance   Clinical Impression   Criteria for Skilled Therapeutic Intervention Yes, treatment indicated   PT Diagnosis (PT) impaired functional mobility, gait abnormality   Influenced by the following impairments decreased strength, decreased endurance   Functional limitations due to impairments bed mob, transfers, gait, stairs   Clinical Presentation (PT Evaluation Complexity) Stable/Uncomplicated   Clinical Presentation Rationale pt presents as medically diagnosed   Clinical Decision Making (Complexity) low complexity   Planned Therapy Interventions (PT) balance training;bed mobility training;gait training;home exercise program;neuromuscular re-education;patient/family education;strengthening;transfer training;stair training   Anticipated Equipment Needs at Discharge (PT) walker, rolling   Risk & Benefits of therapy have been explained evaluation/treatment results reviewed;patient;son   PT Total Evaluation Time   PT Eval, Low Complexity Minutes (94953) 12   Physical Therapy Goals   PT Frequency Daily   PT Predicted Duration/Target Date for Goal Attainment 05/20/23   PT Goals Bed Mobility;Transfers;Gait;Stairs   PT: Bed Mobility Supervision/stand-by assist;Supine to/from sit   PT: Transfers Supervision/stand-by assist;Sit to/from stand;Bed to/from chair;Assistive device   PT: Gait Minimal assist;Assistive device;Rolling walker;50 feet   PT: Stairs Moderate assist;4 stairs;Rail on both sides  (2 assist)   Interventions   Interventions Quick Adds Gait Training;Therapeutic Procedure    Therapeutic Procedure/Exercise   Ther. Procedure: strength, endurance, ROM, flexibillity Minutes (48239) 8   Symptoms Noted During/After Treatment fatigue   Treatment Detail/Skilled Intervention Supine BLE ex x 10 reps with CGA of AP, QS, HS, hip ABD, SLR   Gait Training   Gait Training Minutes (38268) 8   Symptoms Noted During/After Treatment (Gait Training) fatigue   Treatment Detail/Skilled Intervention amb x additional 5' with w/c follow, with FWW and min-modA x 1. Pt unsteady. L foot drags, inc difficulty advancing LLE. Cues for upright and keepin gwalker closer to body, needing modA for keeping walker closer to body. Impulsive.   Riley Level (Gait Training) minimum assist (75% patient effort)   Physical Assistance Level (Gait Training) 1 person + 1 person to manage equipment   Weight Bearing (Gait Training) weight-bearing as tolerated   PT Discharge Planning   PT Plan amb w/ FWW and w/c follow, LE ex, bed mob   PT Discharge Recommendation (DC Rec) Transitional Care Facility   PT Rationale for DC Rec to d/c to home would need to do 6-7 stairs and have A x 1 at all times   PT Brief overview of current status min-modA x 1-2 for mobility with FWW   Total Session Time   Timed Code Treatment Minutes 16   Total Session Time (sum of timed and untimed services) 28

## 2023-05-13 NOTE — PROGRESS NOTES
Pt complained about  bilateral shin very tight sensation after scheduled Gabapentin given about 2030. family seemed very concerned about it. No swelling of both legs noted and no pain. Pt stated feeling better after a little massage of legs.  Writer explained it was probably from neuropathy and medical team will continue to monitor. Notified Dr Dr Montano. Zeb Orozco RN

## 2023-05-13 NOTE — PROGRESS NOTES
Care Management Follow Up    Length of Stay (days): 4    Expected Discharge Date:  To be determined. Possible discharge Monday 5/15 pending labs and mobility.    Concerns to be Addressed:   Alteration in labs requiring IV fluid support. Nephrology following.   Patient plan of care discussed at interdisciplinary rounds: Yes    Anticipated Discharge Disposition:  Transitional care (TCU) is recommended for continued therapy and skilled nursing.     Anticipated Discharge Services:  Continued therapy, skilled nursing and medical management.   Anticipated Discharge DME:  Per therapy (if indicated).    Patient/family educated on Medicare website which has current facility and service quality ratings:  Yes   Education Provided on the Discharge Plan:   Per team  Patient/Family in Agreement with the Plan:   Yes    Referrals Placed by CM/SW:   None at this time. Family will review list and follow up with care management.  Private pay costs discussed: Not applicable     Additional Information:  Patient lives with family and is independent with activities of daily living at baseline. Spoke with PT who noted that patient has 6 steps to enter his home and is currently needing assist of two to transfer. Transitional care (TCU) is recommended for continued therapy and skilled nursing.  Writer met with patient's son at the bedside to discuss the recommendation. Patient was sleeping. Son states that his sister works at [employer] so he will talk to her and other family to make there preferences known to care management.   CM will continue to monitor progression of care, review team recommendations and provide discharge planning assist as needed.      Lisa Larson RN

## 2023-05-13 NOTE — PLAN OF CARE
[facility] - ICU     RN Progress Note:              Pertinent Assessments:      Please refer to flowsheet rows for full assessment     Vitals stable. Denied any pain, only complaining of feeling tight of both shin once this evening, but stated feeling better.  NA rechecked 122 for 1600 and 2000 checks.          Key Events - This Shift:       Uneventful              Barriers to Discharge / Downgrade:      Still monitor Na level        Zeb Orozco RN

## 2023-05-13 NOTE — PROGRESS NOTES
[facility]    Medicine Progress Note - Hospitalist Service    Date of Admission:  5/9/2023    Assessment & Plan   Vashti King is a 77 year old male admitted on 5/9/2023. He has a history of hypertension, diabetes, BPH, CKD presented with nausea and vomiting.  Patient was seen a couple days ago in the ED for burning sensation of the hands and feet and he was started on gabapentin on 5/7/2023.  He stated that he started to have this nausea vomiting after starting the medication.  Per the son at the bedside, patient does not have mental status changes or seizure activity.  Otherwise she denies abdominal pain, urinary or bowel habit changes, fever or chills, no cough shortness of breath, chest pain, or bilateral leg swelling. In the ED, sodium was 120 down from 136 three days prior.     Acute hyponatremia  -In the setting of hydrochlorothiazide use and nausea and vomiting  -Transferred to ICU on 5/10 and on hypertonic saline per nephrology management, transferred to floor on 5/13  =Currently on sodium tabs and NS IVF  -Sodium checks and management per nephrology  -PICC line placed on 5/10  -No neurological changes, patient conversating with family and using bedside commode  -PT/OT   -Advised patient and family members to not bring any outside food or drinks (he has multiple drinks around his bedside)    Essential hypertension  -HCTZ held due to hyponatremia  -Continue with lisinopril 40 mg , diltiazem 240mg  -Stop Metoprolol and transition to carvedilol 25 BID-->50 BID today  -Hydralazine PRN      Diabetes mellitus II  -Increase lantus to 18 units at bedtime, continue meal time novolog and increase SSI  -Accu-Cheks, sliding scale, lantus  -Hemoglobin A1c 7.9    Polyneuropathy-likely secondary to diabetes mellitus  -Continue with gabapentin 300 BID today- seems to help but concern for drowsiness so will change to 200 BID    H/o CKD stage IIIa  - cr at admission 0.78 (baseline 1.1-1.4)  -Monitor  "BMP, I/O    Hyperlipidemia  -Continue with PTA statin    Updated son at bedside.        Diet: Combination Diet Moderate Consistent Carb (60 g CHO per Meal) Diet  Fluid restriction 1200 ML FLUID (avoid anusha ice and water)    DVT Prophylaxis: Enoxaparin (Lovenox) SQ  Saldaña Catheter: Not present  Lines: PRESENT      PICC 05/10/23 Double Lumen Left Basilic Vascular Access-Site Assessment: WDL except;Ecchymotic      Cardiac Monitoring: None  Code Status: Full Code      Clinically Significant Risk Factors         # Hyponatremia: Lowest Na = 116 mmol/L in last 2 days, will monitor as appropriate                # DMII: A1C = 7.9 % (Ref range: <5.7 %) within past 6 months, PRESENT ON ADMISSION  # Overweight: Estimated body mass index is 29.86 kg/m  as calculated from the following:    Height as of this encounter: 1.651 m (5' 5\").    Weight as of this encounter: 81.4 kg (179 lb 7.3 oz)., PRESENT ON ADMISSION         Disposition Plan           Griselda Cote MD  Hospitalist Service  [facility]  Securely message with [employer] (more info)  Text page via [facility] Paging/Directory   ______________________________________________________________________    Interval History   Feeling better today however recognizing that he is extremely weak. Only wants to go home after this. Worried about hospital bills.     Physical Exam   Vital Signs: Temp: 97.5  F (36.4  C) Temp src: Oral BP: (!) 140/94 Pulse: 70   Resp: 23 SpO2: 96 % O2 Device: None (Room air)    Weight: 179 lbs 7.27 oz    Physical Exam  Constitutional:       General: He is not in acute distress.     Appearance: He is obese. He is not toxic-appearing.   HENT:      Head: Normocephalic and atraumatic.      Mouth/Throat:      Mouth: Mucous membranes are moist.   Cardiovascular:      Rate and Rhythm: Normal rate and regular rhythm.   Abdominal:      Palpations: Abdomen is soft.      Tenderness: There is no abdominal tenderness. There is no guarding or rebound. "   Musculoskeletal:         General: No swelling.   Neurological:      General: No focal deficit present.      Mental Status: He is alert and oriented to person, place, and time.       Medical Decision Making       30 MINUTES SPENT BY ME on the date of service doing chart review, history, exam, documentation & further activities per the note.      Data     I have personally reviewed the following data over the past 24 hrs:    N/A  \   N/A   / N/A     126 (L) N/A N/A /  146 (H)   N/A N/A N/A \       Imaging results reviewed over the past 24 hrs:   No results found for this or any previous visit (from the past 24 hour(s)).

## 2023-05-13 NOTE — PLAN OF CARE
[facility] - ICU    RN Progress Note:            Pertinent Assessments:      Please refer to flowsheet rows for full assessment      Slept more  after melatonin according to son. Woke up at 02:30 to use the bathroom. Extensive contact assist of 2 with walker and transfer belt. Bilateral knee giving out. VSS. Sodium level at midnight was 127 and at 06:00 was 126. Patient on NS at 100 ml/hr.           Key Events - This Shift:       Pls refer to above notes.        SJN SAT (Sedation Awakening Trial): For use ONLY if intubated    SAT Safety Screen N/A   If FAILED why?    SAT Performed N/A   If FAILED why?               Barriers to Discharge / Downgrade:     Sodium level.         Point of Contact Update YES-OR-NO: Yes  If No, reason:   Name:Son at the bedside  Phone Number:  Summary of Conversation: Increase activity, Sodium level and safety.

## 2023-05-13 NOTE — PLAN OF CARE
Problem: Plan of Care - These are the overarching goals to be used throughout the patient stay.    Goal: Optimal Comfort and Wellbeing  Outcome: Progressing  Intervention: Provide Person-Centered Care  Recent Flowsheet Documentation  Taken 5/13/2023 1800 by Tyron Reyes RN  Trust Relationship/Rapport:   care explained   questions encouraged   questions answered  Goal: Readiness for Transition of Care  Outcome: Progressing     Problem: Risk for Delirium  Goal: Optimal Coping  Outcome: Progressing  Intervention: Optimize Psychosocial Adjustment to Delirium  Recent Flowsheet Documentation  Taken 5/13/2023 1800 by Tyron Reyes RN  Family/Support System Care: involvement promoted     Problem: Electrolyte Imbalance  Goal: Electrolyte Imbalance: Plan of Care  Outcome: Progressing   Goal Outcome Evaluation:       Patient transferred from ICU today, denies pain appetite is good family assists with feeding.     Tyron Reyes RN

## 2023-05-14 ENCOUNTER — APPOINTMENT (OUTPATIENT)
Dept: OCCUPATIONAL THERAPY | Facility: HOSPITAL | Age: 78
DRG: 644 | End: 2023-05-14
Attending: STUDENT IN AN ORGANIZED HEALTH CARE EDUCATION/TRAINING PROGRAM
Payer: MEDICARE

## 2023-05-14 ENCOUNTER — APPOINTMENT (OUTPATIENT)
Dept: PHYSICAL THERAPY | Facility: HOSPITAL | Age: 78
DRG: 644 | End: 2023-05-14
Payer: MEDICARE

## 2023-05-14 LAB
CORTIS SERPL-MCNC: 13.3 UG/DL
GLUCOSE BLDC GLUCOMTR-MCNC: 148 MG/DL (ref 70–99)
GLUCOSE BLDC GLUCOMTR-MCNC: 163 MG/DL (ref 70–99)
GLUCOSE BLDC GLUCOMTR-MCNC: 192 MG/DL (ref 70–99)
GLUCOSE BLDC GLUCOMTR-MCNC: 220 MG/DL (ref 70–99)
SODIUM SERPL-SCNC: 125 MMOL/L (ref 136–145)
SODIUM SERPL-SCNC: 128 MMOL/L (ref 136–145)

## 2023-05-14 PROCEDURE — 250N000013 HC RX MED GY IP 250 OP 250 PS 637: Performed by: STUDENT IN AN ORGANIZED HEALTH CARE EDUCATION/TRAINING PROGRAM

## 2023-05-14 PROCEDURE — 99232 SBSQ HOSP IP/OBS MODERATE 35: CPT | Performed by: INTERNAL MEDICINE

## 2023-05-14 PROCEDURE — 99231 SBSQ HOSP IP/OBS SF/LOW 25: CPT | Performed by: STUDENT IN AN ORGANIZED HEALTH CARE EDUCATION/TRAINING PROGRAM

## 2023-05-14 PROCEDURE — 84295 ASSAY OF SERUM SODIUM: CPT | Performed by: INTERNAL MEDICINE

## 2023-05-14 PROCEDURE — 97530 THERAPEUTIC ACTIVITIES: CPT | Mod: GP

## 2023-05-14 PROCEDURE — 97116 GAIT TRAINING THERAPY: CPT | Mod: GP

## 2023-05-14 PROCEDURE — 250N000013 HC RX MED GY IP 250 OP 250 PS 637: Performed by: INTERNAL MEDICINE

## 2023-05-14 PROCEDURE — 120N000001 HC R&B MED SURG/OB

## 2023-05-14 PROCEDURE — 97165 OT EVAL LOW COMPLEX 30 MIN: CPT | Mod: GO

## 2023-05-14 PROCEDURE — 97535 SELF CARE MNGMENT TRAINING: CPT | Mod: GO

## 2023-05-14 PROCEDURE — 250N000011 HC RX IP 250 OP 636: Performed by: STUDENT IN AN ORGANIZED HEALTH CARE EDUCATION/TRAINING PROGRAM

## 2023-05-14 PROCEDURE — 82533 TOTAL CORTISOL: CPT | Performed by: INTERNAL MEDICINE

## 2023-05-14 PROCEDURE — 99207 PR CDG-CUT & PASTE-POTENTIAL IMPACT ON LEVEL: CPT | Performed by: STUDENT IN AN ORGANIZED HEALTH CARE EDUCATION/TRAINING PROGRAM

## 2023-05-14 RX ORDER — NALOXONE HYDROCHLORIDE 0.4 MG/ML
0.2 INJECTION, SOLUTION INTRAMUSCULAR; INTRAVENOUS; SUBCUTANEOUS
Status: DISCONTINUED | OUTPATIENT
Start: 2023-05-14 | End: 2023-05-16 | Stop reason: HOSPADM

## 2023-05-14 RX ORDER — LIDOCAINE 4 G/G
1 PATCH TOPICAL
Status: DISCONTINUED | OUTPATIENT
Start: 2023-05-14 | End: 2023-05-16 | Stop reason: HOSPADM

## 2023-05-14 RX ORDER — NALOXONE HYDROCHLORIDE 0.4 MG/ML
0.4 INJECTION, SOLUTION INTRAMUSCULAR; INTRAVENOUS; SUBCUTANEOUS
Status: DISCONTINUED | OUTPATIENT
Start: 2023-05-14 | End: 2023-05-16 | Stop reason: HOSPADM

## 2023-05-14 RX ORDER — TRAMADOL HYDROCHLORIDE 50 MG/1
50 TABLET ORAL EVERY 6 HOURS PRN
Status: DISCONTINUED | OUTPATIENT
Start: 2023-05-14 | End: 2023-05-16 | Stop reason: HOSPADM

## 2023-05-14 RX ORDER — ACETAMINOPHEN 325 MG/1
975 TABLET ORAL 3 TIMES DAILY
Status: DISCONTINUED | OUTPATIENT
Start: 2023-05-14 | End: 2023-05-16 | Stop reason: HOSPADM

## 2023-05-14 RX ORDER — GABAPENTIN 100 MG/1
200 CAPSULE ORAL AT BEDTIME
Status: DISCONTINUED | OUTPATIENT
Start: 2023-05-14 | End: 2023-05-16 | Stop reason: HOSPADM

## 2023-05-14 RX ADMIN — DOCUSATE SODIUM 50 MG AND SENNOSIDES 8.6 MG 1 TABLET: 8.6; 5 TABLET, FILM COATED ORAL at 08:09

## 2023-05-14 RX ADMIN — CARVEDILOL 50 MG: 25 TABLET, FILM COATED ORAL at 17:39

## 2023-05-14 RX ADMIN — GABAPENTIN 200 MG: 100 CAPSULE ORAL at 08:09

## 2023-05-14 RX ADMIN — SODIUM CHLORIDE TAB 1 GM 2 G: 1 TAB at 17:39

## 2023-05-14 RX ADMIN — DILTIAZEM HYDROCHLORIDE 240 MG: 120 CAPSULE, COATED, EXTENDED RELEASE ORAL at 08:09

## 2023-05-14 RX ADMIN — FUROSEMIDE 20 MG: 20 TABLET ORAL at 08:09

## 2023-05-14 RX ADMIN — LIDOCAINE 1 PATCH: 4 PATCH TOPICAL at 09:22

## 2023-05-14 RX ADMIN — GABAPENTIN 200 MG: 100 CAPSULE ORAL at 21:06

## 2023-05-14 RX ADMIN — FUROSEMIDE 20 MG: 20 TABLET ORAL at 17:39

## 2023-05-14 RX ADMIN — ENOXAPARIN SODIUM 40 MG: 40 INJECTION SUBCUTANEOUS at 17:39

## 2023-05-14 RX ADMIN — Medication 1 MG: at 21:58

## 2023-05-14 RX ADMIN — ACETAMINOPHEN 975 MG: 325 TABLET, FILM COATED ORAL at 13:48

## 2023-05-14 RX ADMIN — ATORVASTATIN CALCIUM 40 MG: 40 TABLET, FILM COATED ORAL at 08:09

## 2023-05-14 RX ADMIN — LISINOPRIL 40 MG: 20 TABLET ORAL at 08:09

## 2023-05-14 RX ADMIN — SODIUM CHLORIDE TAB 1 GM 2 G: 1 TAB at 08:09

## 2023-05-14 RX ADMIN — DOCUSATE SODIUM 50 MG AND SENNOSIDES 8.6 MG 1 TABLET: 8.6; 5 TABLET, FILM COATED ORAL at 21:06

## 2023-05-14 RX ADMIN — INSULIN ASPART 1 UNITS: 100 INJECTION, SOLUTION INTRAVENOUS; SUBCUTANEOUS at 08:14

## 2023-05-14 RX ADMIN — INSULIN ASPART 1 UNITS: 100 INJECTION, SOLUTION INTRAVENOUS; SUBCUTANEOUS at 17:39

## 2023-05-14 RX ADMIN — ACETAMINOPHEN 975 MG: 325 TABLET, FILM COATED ORAL at 09:21

## 2023-05-14 RX ADMIN — ACETAMINOPHEN 650 MG: 325 TABLET ORAL at 06:27

## 2023-05-14 RX ADMIN — CARVEDILOL 50 MG: 25 TABLET, FILM COATED ORAL at 08:09

## 2023-05-14 RX ADMIN — ASPIRIN 81 MG: 81 TABLET, COATED ORAL at 08:09

## 2023-05-14 RX ADMIN — INSULIN ASPART 3 UNITS: 100 INJECTION, SOLUTION INTRAVENOUS; SUBCUTANEOUS at 12:55

## 2023-05-14 RX ADMIN — ACETAMINOPHEN 975 MG: 325 TABLET, FILM COATED ORAL at 21:06

## 2023-05-14 ASSESSMENT — ACTIVITIES OF DAILY LIVING (ADL)
ADLS_ACUITY_SCORE: 45
ADLS_ACUITY_SCORE: 43
ADLS_ACUITY_SCORE: 45
ADLS_ACUITY_SCORE: 43
ADLS_ACUITY_SCORE: 45

## 2023-05-14 NOTE — PROGRESS NOTES
RENAL  NOTE     REQUESTING PHYSICIAN: Dr. Torres    REASON FOR CONSULT: Hyponatremia     ASSESSMENT/PLAN:  Acute hyponatremia - Baseline normonatremic, including at ED visit 5/6/2023 when Na+ was 136.   Acutely down to 120 5/9/23 in the setting of uncontrolled pain, nausea, and hydrochlorothiazide use, NSAID x 1 since admit. Likely due to excess ADH exacerbated by thiazide use, supported by Osm studies (urine Osm 822, serum Osm 262, Urine Na 43 in the setting of thiazide diuretic). TSH WNL 5/6/2023.    Now Na improving, off 3% saline since Friday am  On NS with bid lasix and NaCl tabs and Na up to 128. Ongoing very high urine osm c/w ongoing inappropriate ADH activity(uosm 668 and Gato 44). Stop IVF and change Na checks to q12 hours and observe at least day inpt on all oral management to ensure stable before discharge.   Ongoing ADH from pain? ?gabapentin contributing. TSH normal cortisol pending but clinically low suspicion of adrenal insufficiency.   Permanently avoid thiazide diuretics and NSAIDS in future.     Polyneuropathy - Long-standing history of DM in stocking/glove distribution makes diabetic neuropathy likely.   Started gabapentin just prior to admit 5/5. ?contributed to onset / ongoing hyponatremia?  Risk benefit prob favors continuing gabapentin but   ?contributing to daytime sleepiness.  I would suggest try dosing just at HS and see if this provides adequate pain relief during the days.     Constipation defer to primary service.     Hypertension - Now on carvedilol,  lisinopril 40 mg, diltiazem  mg.  Now well controlled.      Type 2 diabetes - BG's here mostly  </=200 so Na+ correction for hyperglycemia is negligible. Management per primary service.     CKD 3a - Baseline Cr 1.1-1.4, eGFR 55-60+. Currently Cr improved from baseline.   Creat much better now ?due to better hydration in hosp (+IVF and off hydrochlorothiazide?)  F/u renal funx in am.    Hyperlipidemia - on statin     HPI:  Vashti King is a 76 yo M with PMH significant for hypertension, CKD 3a, type 2 diabetes,  who presented to [facility] ED for the third time this week with complaint of polyneuropathy of his hands and feet; he was started on gabapentin 5/7/2023 and since that time developed nausea and vomiting. Na+ was noted to be down acutely to 120 (from 136 on 5/6/2023) and nephrology was consulted.     Very sleepy again late am.   Gabapentin bid given at 8 am.   C/o back pain overnoc and slept poorly  dtr here. Says he was good last evening.   Eating ok per wife report.     Neuropathy pain is better not gone and now intermittent back pain.   REVIEW OF SYSTEMS:  Comprehensive ROS negative except per HPI     ALLERGIES/SENSITIVITIES:  Allergies   Allergen Reactions     Amlodipine Swelling     Swelling in legs      Social History     Tobacco Use     Smoking status: Never     Smokeless tobacco: Never   Substance Use Topics     Drug use: No     I have reviewed this patient's family history and updated it with pertinent information if needed.  Family History   Problem Relation Age of Onset     Diabetes Sister      Hypertension Sister      Diabetes Brother      Hypertension Brother      Cerebrovascular Disease Brother          PHYSICAL EXAM:  Physical Exam   Temp: 98.6  F (37  C) Temp src: Oral BP: (!) 145/76 Pulse: 60   Resp: 24 SpO2: 98 % O2 Device: None (Room air)    Vitals:    05/09/23 0752 05/13/23 0300   Weight: 81.6 kg (180 lb) 81.4 kg (179 lb 7.3 oz)     Vital Signs with Ranges  Temp:  [97.5  F (36.4  C)-98.9  F (37.2  C)] 98.6  F (37  C)  Pulse:  [56-68] 60  Resp:  [18-24] 24  BP: ()/(59-91) 145/76  SpO2:  [95 %-99 %] 98 %  I/O last 3 completed shifts:  In: 961 [P.O.:360; I.V.:601]  Out: 1700 [Urine:1700]    @TMAXR(24)@    Patient Vitals for the past 72 hrs:   Weight   05/13/23 0300 81.4 kg (179 lb 7.3 oz)       General - asleep, does arouse and answer questions from dtr. Some myoclonus noted.   HEENT - AT NC mmm  Neck -  no JVD  Respiratory - Lungs CTA bilat    Cardiovascular - AP RRR  Abdomen - soft,  non tender.   Extremities - well perfused, no edema  Integumentary - intact, good turgor, no rash/lesions  Neurologic -asleep, non focal when awake     Laboratory:     Recent Labs   Lab 05/10/23  0529 05/09/23  0847   WBC 8.1 7.6   RBC 5.11 5.48   HGB 15.3 16.8   HCT 41.5 45.2    273       Basic Metabolic Panel:  Recent Labs   Lab 05/14/23  0741 05/14/23  0618 05/13/23  2107 05/13/23  1824 05/13/23  1655 05/13/23  1104 05/13/23  0711 05/13/23  0610 05/13/23  0023 05/12/23 2003 05/12/23 2002 05/12/23  1657 05/12/23  1607 05/12/23  0746 05/12/23  0441 05/10/23  2221 05/10/23  2018 05/10/23  1819 05/10/23  1651 05/10/23  1546 05/10/23  1205 05/10/23  1004 05/10/23  0812 05/10/23  0529   NA  --  128*  --  126*  --   --   --  126* 127*  --  122*  --  122*   < > 123*  123*   < > 113* 112*  --  112*   < > 111*  --  117*   POTASSIUM  --   --   --   --   --   --   --   --   --   --   --   --   --   --  3.8  --  3.9 4.2  --  4.0  --  3.9  --  3.7   CHLORIDE  --   --   --   --   --   --   --   --   --   --   --   --   --   --  91*  --  80* 79*  --  78*  --  78*  --  79*   CO2  --   --   --   --   --   --   --   --   --   --   --   --   --   --  20*  --  19* 19*  --  21*  --  18*  --  22   BUN  --   --   --   --   --   --   --   --   --   --   --   --   --   --  15.8  --  18.3 18.3  --  17.9  --  18.4  --  18.6   CR  --   --   --   --   --   --   --   --   --   --   --   --   --   --  0.55*  --  0.71 0.66*  --  0.68  --  0.68  --  0.81   *  --  150*  --  237* 190* 146*  --   --  272*  --    < >  --    < > 137*   < > 170* 214*   < > 146*   < > 166*   < > 134*   HELEN  --   --   --   --   --   --   --   --   --   --   --   --   --   --  8.2*  --  8.6* 8.5*  --  8.8  --  8.9  --  8.7*    < > = values in this interval not displayed.       INRNo lab results found in last 7 days.    Recent Labs   Lab Test 05/09/23  0847 05/06/23  1804    POTASSIUM 4.0 4.4   CHLORIDE 81* 96*   BUN 15.5 16.1      Recent Labs   Lab Test 05/09/23  1155 05/09/23  0847 12/06/22  0951 08/30/22  1010   ALBUMIN  --  4.5 4.5  --    BILITOTAL  --  1.6* 0.7  --    ALT  --  21 23  --    AST  --  29 39  --    PROTEIN 20*  --   --  Negative       Personally reviewed today's laboratory studies      Thank you for involving us in the care of this patient. We will continue to follow along with you.    Ameena Hays MD  [facility]  [phone]

## 2023-05-14 NOTE — PROGRESS NOTES
Occupational Therapy      05/14/23 1320   Appointment Info   Signing Clinician's Name / Credentials (OT) Anita Estrada OTR/L       Present yes   Language Hmong  (daughter interprets)   Living Environment   People in Home spouse   Current Living Arrangements house   Home Accessibility stairs to enter home   Number of Stairs, Main Entrance 6   Stair Railings, Main Entrance railing on left side (ascending)   Transportation Anticipated family or friend will provide   Living Environment Comments Tub/shower unit w/ GB and shower chair   Self-Care   Usual Activity Tolerance moderate   Current Activity Tolerance fair   Regular Exercise No   Equipment Currently Used at Home cane, straight  (has fww at home per daughter report)   Activity/Exercise/Self-Care Comment Normally Ind. w/ all ADL/IADL tasks   Instrumental Activities of Daily Living (IADL)   IADL Comments Pt very ind. mowing lawn/household tasks   General Information   Onset of Illness/Injury or Date of Surgery 05/09/23   Referring Physician Griselda Cote MD   Additional Occupational Profile Info/Pertinent History of Current Problem 77 year old male admitted on 5/9/2023. He has a history of hypertension, diabetes, BPH, CKD presented with nausea and vomiting.  Patient was seen a couple days ago in the ED for burning sensation of the hands and feet and he was started on gabapentin on 5/7/2023.  He stated that he started to have this nausea vomiting after starting the medication.  Per the son at the bedside, patient does not have mental status changes or seizure activity.  Otherwise she denies abdominal pain, urinary or bowel habit changes, fever or chills, no cough shortness of breath, chest pain, or bilateral leg swelling. In the ED, sodium was 120 down from 136 three days prior.   Existing Precautions/Restrictions fall   Range of Motion Comprehensive   General Range of Motion bilateral upper extremity ROM WNL   Strength Comprehensive (MMT)    General Manual Muscle Testing (MMT) Assessment upper extremity strength deficits identified   Upper Extremity (Manual Muscle Testing)   Upper Extremity: Manual Muscle Testing (MMT) left shoulder strength deficit;right shoulder strength deficit   Bed Mobility   Bed Mobility supine-sit   Supine-Sit [place] (Bed Mobility) contact guard;verbal cues   Transfers   Transfers sit-stand transfer   Sit-Stand Transfer   Sit-Stand [place] (Transfers) minimum assist (75% patient effort);moderate assist (50% patient effort);2 person assist   Assistive Device (Sit-Stand Transfers) walker, front-wheeled   Balance   Balance Assessment standing balance: dynamic;standing balance: static   Activities of Daily Living   BADL Assessment/Intervention lower body dressing   Lower Body Dressing Assessment/Training   Comment, (Lower Body Dressing) Pt requires Assist for dressing/bathing/toileting d/t decreased balance/ADL ind.   [place] Level (Lower Body Dressing) moderate assist (50% patient effort)   Clinical Impression   Criteria for Skilled Therapeutic Interventions Met (OT) Yes, treatment indicated   OT Diagnosis decreased ADL ind/safety   OT Problem List-Impairments impacting ADL problems related to;activity tolerance impaired;balance;strength;mobility   Assessment of Occupational Performance 1-3 Performance Deficits   Identified Performance Deficits balance, toileting, dressing   Planned Therapy Interventions (OT) ADL retraining;balance training;strengthening;transfer training;home program guidelines   Clinical Decision Making Complexity (OT) low complexity   Anticipated Equipment Needs Upon Discharge (OT) shower chair   Risk & Benefits of therapy have been explained evaluation/treatment results reviewed;patient;daughter   OT Total Evaluation Time   OT Eval, Low Complexity Minutes (03062) 12   OT Goals   Therapy Frequency (OT) Daily   OT Predicted Duration/Target Date for Goal Attainment 05/21/23   OT Goals Lower Body  Dressing;Hygiene/Grooming;Transfers;Toilet Transfer/Toileting   Self-Care/Home Management   Self-Care/Home Mgmt/ADL, Compensatory, Meal Prep Minutes (30884) 15   Symptoms Noted During/After Treatment (Meal Preparation/Planning Training) fatigue   Treatment Detail/Skilled Intervention Pt performed additional STSx2 w/ Min.Ax2 w/ fww cues for hand placement for trnfs to ensure safety/stability- pt took a few steps forward w/ Min-Mod.Ax1-2 pt BLE very shakey chair follow, Mod.Ax2 pivot trnf eOB>recliner, pt reported he was unable to feed self- pt able to demo Ind. ability to self feed w/ RUE while upright in recliner OT educated/encouraged pt to get up to chair for all meals to enhance postural control/body mech. Alarm on at end of session call light w/in reach daughter present throughout session   OT Discharge Planning   OT Plan toileting, Assistx2 mobility for safety w/ fww, LB dressing   OT Discharge Recommendation (DC Rec) Transitional Care Facility   OT Rationale for DC Rec OT recommending TCU upon d/c to enhance safety w/ ADL routine- pt requiring assistx2 for trnfs at this time d/t decresed balance/strength. If were to d/c home would recommend Assistx2 w/ fww for all mobility/ADL tasks.   OT Brief overview of current status Min-Mod.Ax2 STS trnfs w/ fww   Total Session Time   Timed Code Treatment Minutes 15   Total Session Time (sum of timed and untimed services) 27

## 2023-05-14 NOTE — PLAN OF CARE
Problem: Plan of Care - These are the overarching goals to be used throughout the patient stay.    Goal: Absence of Hospital-Acquired Illness or Injury  Intervention: Identify and Manage Fall Risk  Recent Flowsheet Documentation  Taken 5/14/2023 0000 by Petar Camejo RN  Safety Promotion/Fall Prevention:   activity supervised   assistive device/personal items within reach   nonskid shoes/slippers when out of bed   patient and family education  Taken 5/13/2023 2000 by Petar Camejo RN  Safety Promotion/Fall Prevention:   activity supervised   assistive device/personal items within reach   nonskid shoes/slippers when out of bed   patient and family education     Problem: Electrolyte Imbalance  Goal: Electrolyte Imbalance: Plan of Care  Outcome: Progressing     Problem: Diabetes Comorbidity  Goal: Blood Glucose Level Within Targeted Range  Outcome: Progressing   Goal Outcome Evaluation:       Ambulated patient from bed to chair with 1 person assist, noticed patient tends to toss himself when he lowers himself. Complains of nueropathy, outpatient MD started him on gabapentin, noticed patient becomes really sleepy with it, watch for falls.

## 2023-05-14 NOTE — PLAN OF CARE
Problem: Plan of Care - These are the overarching goals to be used throughout the patient stay.    Goal: Absence of Hospital-Acquired Illness or Injury  Outcome: Progressing     Problem: Risk for Delirium  Goal: Improved Behavioral Control  Outcome: Progressing  Intervention: Minimize Safety Risk  Recent Flowsheet Documentation  Taken 5/14/2023 1730 by Tyron Reyes RN  Trust Relationship/Rapport:   care explained   choices provided   questions answered   questions encouraged  Taken 5/14/2023 0841 by Tyron Reyes RN  Trust Relationship/Rapport:   care explained   choices provided   questions answered   questions encouraged  Goal: Improved Attention and Thought Clarity  Outcome: Progressing  Intervention: Maximize Cognitive Function  Recent Flowsheet Documentation  Taken 5/14/2023 1730 by Tyron Reyes RN  Reorientation Measures:   clock in view   family pictures in room  Taken 5/14/2023 0841 by Tyron Reyes RN  Reorientation Measures:   clock in view   family pictures in room     Problem: Electrolyte Imbalance  Goal: Electrolyte Imbalance: Plan of Care  Outcome: Progressing   Goal Outcome Evaluation:       Pain is well controlled with present regimen, worked with therapy today and tolerated well. Appetite is good, makes needs known appropriately via .    Tyron Reyes RN

## 2023-05-14 NOTE — PROGRESS NOTES
[facility]    Medicine Progress Note - Hospitalist Service    Date of Admission:  5/9/2023    Assessment & Plan   Vashti King is a 77 year old male admitted on 5/9/2023. He has a history of hypertension, diabetes, BPH, CKD presented with nausea and vomiting.  Patient was seen a couple days ago in the ED for burning sensation of the hands and feet and he was started on gabapentin on 5/7/2023.  He stated that he started to have this nausea vomiting after starting the medication.  Per the son at the bedside, patient does not have mental status changes or seizure activity.  Otherwise she denies abdominal pain, urinary or bowel habit changes, fever or chills, no cough shortness of breath, chest pain, or bilateral leg swelling. In the ED, sodium was 120 down from 136 three days prior.     Acute hyponatremia  -In the setting of hydrochlorothiazide use and nausea and vomiting  -Transferred to ICU on 5/10 and on hypertonic saline per nephrology management, transferred to floor on 5/13  -Currently on sodium tabs and NS IVF  -Sodium checks and management per nephrology  -PICC line placed on 5/10  -No neurological changes, patient conversating with family and using bedside commode  -PT/OT-->likely need TCU  -Advised patient and family members to not bring any outside food or drinks (he has multiple drinks around his bedside)    Back pain   -Uncontrolled so started on tylenol TID and lidocaine patches   -Tramadol Q6H PRN for severe pain     Essential hypertension  -HCTZ held due to hyponatremia  -Continue with lisinopril 40 mg , diltiazem 240mg  -Stop Metoprolol and transition to carvedilol 25 BID-->50 BID  -Hydralazine PRN      Diabetes mellitus II  -Increase lantus to 18 units at bedtime, continue meal time novolog and increase SSI  -Accu-Cheks, sliding scale, lantus  -Hemoglobin A1c 7.9    Polyneuropathy-likely secondary to diabetes mellitus  -Continue with gabapentin 300 BID today- seems to help but  "concern for drowsiness and doing well on 200 BID    H/o CKD stage IIIa  - cr at admission 0.78 (baseline 1.1-1.4)  -Monitor BMP, I/O    Hyperlipidemia  -Continue with PTA statin    Updated son at bedside.        Diet: Combination Diet Moderate Consistent Carb (60 g CHO per Meal) Diet  Fluid restriction 1200 ML FLUID (avoid anusha ice and water)    DVT Prophylaxis: Enoxaparin (Lovenox) SQ  [place] Catheter: Not present  Lines: PRESENT      PICC 05/10/23 Double Lumen Left Basilic Vascular Access-Site Assessment: WDL      Cardiac Monitoring: None  Code Status: Full Code      Clinically Significant Risk Factors         # Hyponatremia: Lowest Na = 122 mmol/L in last 2 days, will monitor as appropriate                # DMII: A1C = 7.9 % (Ref range: <5.7 %) within past 6 months   # Overweight: Estimated body mass index is 29.86 kg/m  as calculated from the following:    Height as of this encounter: 1.651 m (5' 5\").    Weight as of this encounter: 81.4 kg (179 lb 7.3 oz).          Disposition Plan     Expected Discharge Date: 05/16/2023                Griselda Cote MD  Hospitalist Service  [facility]  Securely message with [employer] (more info)  Text page via [employer] Paging/Directory   ______________________________________________________________________    Interval History   Complaining of back pain- in agreement with pain regimen above.   Feeling very weak overall, questioning if he has to go to TCU    Physical Exam   Vital Signs: Temp: 98.9  F (37.2  C) Temp src: Oral BP: (!) 160/74 Pulse: 69   Resp: 20 SpO2: 98 % O2 Device: None (Room air)    Weight: 179 lbs 7.27 oz    Physical Exam  Constitutional:       General: He is not in acute distress.     Appearance: He is obese. He is not toxic-appearing.   HENT:      Head: Normocephalic and atraumatic.      Mouth/Throat:      Mouth: Mucous membranes are moist.   Cardiovascular:      Rate and Rhythm: Normal rate and regular rhythm.   Abdominal:      Palpations: " Abdomen is soft.      Tenderness: There is no abdominal tenderness. There is no guarding or rebound.   Musculoskeletal:         General: No swelling.   Neurological:      General: No focal deficit present.      Mental Status: He is alert and oriented to person, place, and time.       Medical Decision Making       30 MINUTES SPENT BY ME on the date of service doing chart review, history, exam, documentation & further activities per the note.      Data     I have personally reviewed the following data over the past 24 hrs:    N/A  \   N/A   / N/A     128 (L) N/A N/A /  148 (H)   N/A N/A N/A \       Imaging results reviewed over the past 24 hrs:   No results found for this or any previous visit (from the past 24 hour(s)).

## 2023-05-15 ENCOUNTER — APPOINTMENT (OUTPATIENT)
Dept: OCCUPATIONAL THERAPY | Facility: HOSPITAL | Age: 78
DRG: 644 | End: 2023-05-15
Payer: MEDICARE

## 2023-05-15 ENCOUNTER — APPOINTMENT (OUTPATIENT)
Dept: PHYSICAL THERAPY | Facility: HOSPITAL | Age: 78
DRG: 644 | End: 2023-05-15
Payer: MEDICARE

## 2023-05-15 LAB
ALBUMIN SERPL BCG-MCNC: 3.6 G/DL (ref 3.5–5.2)
ANION GAP SERPL CALCULATED.3IONS-SCNC: 7 MMOL/L (ref 7–15)
BUN SERPL-MCNC: 17.8 MG/DL (ref 8–23)
CALCIUM SERPL-MCNC: 8.4 MG/DL (ref 8.8–10.2)
CHLORIDE SERPL-SCNC: 95 MMOL/L (ref 98–107)
CREAT SERPL-MCNC: 0.77 MG/DL (ref 0.67–1.17)
DEPRECATED HCO3 PLAS-SCNC: 27 MMOL/L (ref 22–29)
GFR SERPL CREATININE-BSD FRML MDRD: >90 ML/MIN/1.73M2
GLUCOSE BLDC GLUCOMTR-MCNC: 107 MG/DL (ref 70–99)
GLUCOSE BLDC GLUCOMTR-MCNC: 151 MG/DL (ref 70–99)
GLUCOSE BLDC GLUCOMTR-MCNC: 229 MG/DL (ref 70–99)
GLUCOSE BLDC GLUCOMTR-MCNC: 239 MG/DL (ref 70–99)
GLUCOSE SERPL-MCNC: 124 MG/DL (ref 70–99)
PHOSPHATE SERPL-MCNC: 2.4 MG/DL (ref 2.5–4.5)
POTASSIUM SERPL-SCNC: 3.2 MMOL/L (ref 3.4–5.3)
SODIUM SERPL-SCNC: 129 MMOL/L (ref 136–145)
SODIUM SERPL-SCNC: 129 MMOL/L (ref 136–145)

## 2023-05-15 PROCEDURE — 99207 PR CDG-CUT & PASTE-POTENTIAL IMPACT ON LEVEL: CPT | Performed by: STUDENT IN AN ORGANIZED HEALTH CARE EDUCATION/TRAINING PROGRAM

## 2023-05-15 PROCEDURE — 99231 SBSQ HOSP IP/OBS SF/LOW 25: CPT | Performed by: STUDENT IN AN ORGANIZED HEALTH CARE EDUCATION/TRAINING PROGRAM

## 2023-05-15 PROCEDURE — 97530 THERAPEUTIC ACTIVITIES: CPT | Mod: GP

## 2023-05-15 PROCEDURE — 250N000013 HC RX MED GY IP 250 OP 250 PS 637: Performed by: INTERNAL MEDICINE

## 2023-05-15 PROCEDURE — 97535 SELF CARE MNGMENT TRAINING: CPT | Mod: GO

## 2023-05-15 PROCEDURE — 250N000013 HC RX MED GY IP 250 OP 250 PS 637: Performed by: STUDENT IN AN ORGANIZED HEALTH CARE EDUCATION/TRAINING PROGRAM

## 2023-05-15 PROCEDURE — 84295 ASSAY OF SERUM SODIUM: CPT | Performed by: INTERNAL MEDICINE

## 2023-05-15 PROCEDURE — 120N000001 HC R&B MED SURG/OB

## 2023-05-15 PROCEDURE — 82040 ASSAY OF SERUM ALBUMIN: CPT | Performed by: INTERNAL MEDICINE

## 2023-05-15 PROCEDURE — 250N000011 HC RX IP 250 OP 636: Performed by: STUDENT IN AN ORGANIZED HEALTH CARE EDUCATION/TRAINING PROGRAM

## 2023-05-15 PROCEDURE — 99232 SBSQ HOSP IP/OBS MODERATE 35: CPT | Performed by: INTERNAL MEDICINE

## 2023-05-15 PROCEDURE — 97110 THERAPEUTIC EXERCISES: CPT | Mod: GP

## 2023-05-15 RX ORDER — POTASSIUM CHLORIDE 1.5 G/1.58G
40 POWDER, FOR SOLUTION ORAL ONCE
Status: COMPLETED | OUTPATIENT
Start: 2023-05-15 | End: 2023-05-15

## 2023-05-15 RX ADMIN — ASPIRIN 81 MG: 81 TABLET, COATED ORAL at 07:55

## 2023-05-15 RX ADMIN — ATORVASTATIN CALCIUM 40 MG: 40 TABLET, FILM COATED ORAL at 07:55

## 2023-05-15 RX ADMIN — DOCUSATE SODIUM 50 MG AND SENNOSIDES 8.6 MG 1 TABLET: 8.6; 5 TABLET, FILM COATED ORAL at 07:56

## 2023-05-15 RX ADMIN — CARVEDILOL 50 MG: 25 TABLET, FILM COATED ORAL at 07:55

## 2023-05-15 RX ADMIN — INSULIN ASPART 1 UNITS: 100 INJECTION, SOLUTION INTRAVENOUS; SUBCUTANEOUS at 12:24

## 2023-05-15 RX ADMIN — ACETAMINOPHEN 975 MG: 325 TABLET, FILM COATED ORAL at 13:14

## 2023-05-15 RX ADMIN — ACETAMINOPHEN 975 MG: 325 TABLET, FILM COATED ORAL at 07:56

## 2023-05-15 RX ADMIN — FUROSEMIDE 20 MG: 20 TABLET ORAL at 07:57

## 2023-05-15 RX ADMIN — FUROSEMIDE 20 MG: 20 TABLET ORAL at 18:13

## 2023-05-15 RX ADMIN — SODIUM CHLORIDE TAB 1 GM 2 G: 1 TAB at 18:13

## 2023-05-15 RX ADMIN — POTASSIUM & SODIUM PHOSPHATES POWDER PACK 280-160-250 MG 1 PACKET: 280-160-250 PACK at 13:14

## 2023-05-15 RX ADMIN — POTASSIUM & SODIUM PHOSPHATES POWDER PACK 280-160-250 MG 1 PACKET: 280-160-250 PACK at 18:23

## 2023-05-15 RX ADMIN — Medication 1 MG: at 20:14

## 2023-05-15 RX ADMIN — GABAPENTIN 200 MG: 100 CAPSULE ORAL at 20:15

## 2023-05-15 RX ADMIN — CARVEDILOL 50 MG: 25 TABLET, FILM COATED ORAL at 18:13

## 2023-05-15 RX ADMIN — INSULIN ASPART 4 UNITS: 100 INJECTION, SOLUTION INTRAVENOUS; SUBCUTANEOUS at 17:50

## 2023-05-15 RX ADMIN — ACETAMINOPHEN 975 MG: 325 TABLET, FILM COATED ORAL at 20:14

## 2023-05-15 RX ADMIN — POTASSIUM & SODIUM PHOSPHATES POWDER PACK 280-160-250 MG 1 PACKET: 280-160-250 PACK at 20:19

## 2023-05-15 RX ADMIN — TRAMADOL HYDROCHLORIDE 50 MG: 50 TABLET ORAL at 22:37

## 2023-05-15 RX ADMIN — DOCUSATE SODIUM 50 MG AND SENNOSIDES 8.6 MG 1 TABLET: 8.6; 5 TABLET, FILM COATED ORAL at 20:15

## 2023-05-15 RX ADMIN — POTASSIUM CHLORIDE 40 MEQ: 1.5 POWDER, FOR SOLUTION ORAL at 06:47

## 2023-05-15 RX ADMIN — LIDOCAINE 1 PATCH: 4 PATCH TOPICAL at 07:54

## 2023-05-15 RX ADMIN — SODIUM CHLORIDE TAB 1 GM 2 G: 1 TAB at 07:55

## 2023-05-15 RX ADMIN — ENOXAPARIN SODIUM 40 MG: 40 INJECTION SUBCUTANEOUS at 18:13

## 2023-05-15 RX ADMIN — LISINOPRIL 40 MG: 20 TABLET ORAL at 07:54

## 2023-05-15 RX ADMIN — TRAMADOL HYDROCHLORIDE 50 MG: 50 TABLET ORAL at 00:16

## 2023-05-15 RX ADMIN — DILTIAZEM HYDROCHLORIDE 240 MG: 120 CAPSULE, COATED, EXTENDED RELEASE ORAL at 07:55

## 2023-05-15 ASSESSMENT — ACTIVITIES OF DAILY LIVING (ADL)
ADLS_ACUITY_SCORE: 43
ADLS_ACUITY_SCORE: 43
ADLS_ACUITY_SCORE: 40
ADLS_ACUITY_SCORE: 47
ADLS_ACUITY_SCORE: 43
ADLS_ACUITY_SCORE: 47
ADLS_ACUITY_SCORE: 43
ADLS_ACUITY_SCORE: 47
ADLS_ACUITY_SCORE: 40

## 2023-05-15 NOTE — PLAN OF CARE
Goal Outcome Evaluation:      Plan of Care Reviewed With: patient, family    Overall Patient Progress: improvingOverall Patient Progress: improving    Outcome Evaluation: NA levels stabilizing, electrolytes being supplemented. PT working with PT/OT. therapy recs TCU- placement being worked on. Pt reports back pain- relief with tylenol and lidocaine patch.      Problem: Plan of Care - These are the overarching goals to be used throughout the patient stay.    Goal: Absence of Hospital-Acquired Illness or Injury  Intervention: Identify and Manage Fall Risk  Recent Flowsheet Documentation  Taken 5/15/2023 1234 by Lisa Bolton RN  Safety Promotion/Fall Prevention:   activity supervised   assistive device/personal items within reach   nonskid shoes/slippers when out of bed   patient and family education   supervised activity  Taken 5/15/2023 0745 by Lisa Bolton RN  Safety Promotion/Fall Prevention:   activity supervised   assistive device/personal items within reach   nonskid shoes/slippers when out of bed   patient and family education     Problem: Plan of Care - These are the overarching goals to be used throughout the patient stay.    Goal: Optimal Comfort and Wellbeing  Intervention: Monitor Pain and Promote Comfort  Recent Flowsheet Documentation  Taken 5/15/2023 0745 by Lisa Bolton RN  Pain Management Interventions:   medication (see MAR)   emotional support   repositioned     Problem: Electrolyte Imbalance  Goal: Electrolyte Imbalance: Plan of Care  Outcome: Progressing  Intervention: Monitor and Manage Electrolyte Imbalance  Recent Flowsheet Documentation  Taken 5/15/2023 1234 by Lisa Bolton RN  Fluid/Electrolyte Management: fluids restricted  Taken 5/15/2023 0745 by Lisa Bolton RN  Fluid/Electrolyte Management: fluids restricted

## 2023-05-15 NOTE — PROGRESS NOTES
RENAL  NOTE     REQUESTING PHYSICIAN: Dr. Torres    REASON FOR CONSULT: Hyponatremia     ASSESSMENT/PLAN:  Acute hyponatremia - Baseline normonatremic, including at ED visit 5/6/2023 when Na+ was 136.   Acutely down to 120 5/9/23 in the setting of uncontrolled pain, nausea, and hydrochlorothiazide use, NSAID x 1 since admit. Likely due to excess ADH exacerbated by thiazide use, supported by Osm studies (urine Osm 822, serum Osm 262, Urine Na 43 in the setting of thiazide diuretic). TSH WNL 5/6/2023.  Ongoing very high urine osm c/w ongoing inappropriate ADH activity(uosm 668 and Gato 44). IVFs stopped and changed to oral management.  Ongoing ADH from pain? ?gabapentin contributing.  Permanently avoid thiazide diuretics and NSAIDS in future.  Sodium dropped to 125 overnight uncertain if anyone notified but spontaneously up to 129 this morning so question if real?  Recs:  - continue salt tabs, fluid restriction and lasix today  - would like to see more consistent trend upwards prior to discharge  - in the long term, suspect we won't need the fluid restriction and salt tabs  - BMP tomorrow, I don't think we need q12h sodiums anymore  - may benefit from follow up in our clinic    Hypokalemia - related to diuretics, salt loading, started on replacement    Polyneuropathy - Long-standing history of DM in stocking/glove distribution makes diabetic neuropathy likely.   Started gabapentin just prior to admit 5/5. ?contributed to onset / ongoing hyponatremia?  Risk benefit prob favors continuing gabapentin and seemed to tolerated nighttime dosing     Hypertension - Now on carvedilol,  lisinopril 40 mg, diltiazem  mg and lasix.  Now well controlled.      Type 2 diabetes - BG's here mostly  </=200 so Na+ correction for hyperglycemia is negligible. Management per primary service.     CKD 3a - Baseline Cr 1.1-1.4, eGFR 55-60+. Currently Cr improved from baseline.   Creat much better now ?due to better hydration in hosp  (+IVF and off hydrochlorothiazide?)    Hyperlipidemia - on statin         HPI: Awake and alert and up in chair.  Family here and helping interpret.  Didn't get as sleepy with changes in pain medications overnight.  Eating okay.  Still weak.  SW reviewed potential TCU with patient and family earlier today.  I reviewed his improving sodium.  Suspect he will not need the fluid restriction and salt tabs in the longer term.      REVIEW OF SYSTEMS:  Comprehensive ROS negative except per HPI     ALLERGIES/SENSITIVITIES:  Allergies   Allergen Reactions     Amlodipine Swelling     Swelling in legs      Social History     Tobacco Use     Smoking status: Never     Smokeless tobacco: Never   Substance Use Topics     Drug use: No     I have reviewed this patient's family history and updated it with pertinent information if needed.  Family History   Problem Relation Age of Onset     Diabetes Sister      Hypertension Sister      Diabetes Brother      Hypertension Brother      Cerebrovascular Disease Brother          PHYSICAL EXAM:  Physical Exam   Temp: 98.3  F (36.8  C) Temp src: Oral BP: 137/82 Pulse: 62   Resp: 16 SpO2: 97 % O2 Device: None (Room air)    Vitals:    05/09/23 0752 05/13/23 0300   Weight: 81.6 kg (180 lb) 81.4 kg (179 lb 7.3 oz)     Vital Signs with Ranges  Temp:  [97  F (36.1  C)-98.9  F (37.2  C)] 98.3  F (36.8  C)  Pulse:  [50-69] 62  Resp:  [16-20] 16  BP: (103-160)/(63-84) 137/82  SpO2:  [96 %-98 %] 97 %  I/O last 3 completed shifts:  In: 460 [P.O.:460]  Out: 600 [Urine:600]    @TMAXR(24)@    Patient Vitals for the past 72 hrs:   Weight   05/13/23 0300 81.4 kg (179 lb 7.3 oz)       General - alert, sitting up in chair  HEENT - AT NC mmm  Neck - no JVD  Respiratory - Lungs CTA bilat    Cardiovascular - AP RRR  Abdomen - soft,  non tender.   Extremities - well perfused, no edema  Integumentary - intact, good turgor, no rash/lesions  Neurologic - awake and nonfocal    Laboratory:     Recent Labs   Lab  05/10/23  0529 05/09/23  0847   WBC 8.1 7.6   RBC 5.11 5.48   HGB 15.3 16.8   HCT 41.5 45.2    273       Basic Metabolic Panel:  Recent Labs   Lab 05/15/23  0738 05/15/23  0513 05/14/23  2139 05/14/23  1753 05/14/23  1657 05/14/23  1214 05/14/23  0741 05/14/23  0618 05/13/23  2107 05/13/23  1824 05/13/23  0711 05/13/23  0610 05/13/23  0023 05/12/23  0746 05/12/23  0441 05/10/23  2221 05/10/23  2018 05/10/23  1819 05/10/23  1651 05/10/23  1546 05/10/23  1205 05/10/23  1004   NA  --  129*  129*  --  125*  --   --   --  128*  --  126*  --  126* 127*   < > 123*  123*   < > 113* 112*  --  112*   < > 111*   POTASSIUM  --  3.2*  --   --   --   --   --   --   --   --   --   --   --   --  3.8  --  3.9 4.2  --  4.0  --  3.9   CHLORIDE  --  95*  --   --   --   --   --   --   --   --   --   --   --   --  91*  --  80* 79*  --  78*  --  78*   CO2  --  27  --   --   --   --   --   --   --   --   --   --   --   --  20*  --  19* 19*  --  21*  --  18*   BUN  --  17.8  --   --   --   --   --   --   --   --   --   --   --   --  15.8  --  18.3 18.3  --  17.9  --  18.4   CR  --  0.77  --   --   --   --   --   --   --   --   --   --   --   --  0.55*  --  0.71 0.66*  --  0.68  --  0.68   * 124* 220*  --  163* 192* 148*  --    < >  --    < >  --   --    < > 137*   < > 170* 214*   < > 146*   < > 166*   HELEN  --  8.4*  --   --   --   --   --   --   --   --   --   --   --   --  8.2*  --  8.6* 8.5*  --  8.8  --  8.9    < > = values in this interval not displayed.       INRNo lab results found in last 7 days.    Recent Labs   Lab Test 05/09/23  0847 05/06/23  1856   POTASSIUM 4.0 4.4   CHLORIDE 81* 96*   BUN 15.5 16.1      Recent Labs   Lab Test 05/09/23  1155 05/09/23  0847 12/06/22  0951 08/30/22  1010   ALBUMIN  --  4.5 4.5  --    BILITOTAL  --  1.6* 0.7  --    ALT  --  21 23  --    AST  --  29 39  --    PROTEIN 20*  --   --  Negative       Personally reviewed today's laboratory studies      Thank you for involving us in the  care of this patient. We will continue to follow along with you.    Joce Taveras  [facility]  [phone]

## 2023-05-15 NOTE — PLAN OF CARE
Problem: Plan of Care - These are the overarching goals to be used throughout the patient stay.    Goal: Optimal Comfort and Wellbeing  Outcome: Progressing  Intervention: Monitor Pain and Promote Comfort  Recent Flowsheet Documentation  Taken 5/15/2023 0016 by Micaela King RN  Pain Management Interventions: medication (see MAR)  Taken 5/14/2023 2126 by Micaela King RN  Pain Management Interventions: medication (see MAR)     Problem: Electrolyte Imbalance  Goal: Electrolyte Imbalance: Plan of Care  Outcome: Progressing  Intervention: Monitor and Manage Electrolyte Imbalance  Flowsheets (Taken 5/15/2023 8956)  Fluid/Electrolyte Management: fluids restricted     Problem: Diabetes Comorbidity  Goal: Blood Glucose Level Within Targeted Range  Outcome: Progressing     Goal Outcome Evaluation:         Pt reported 10/10 back pain. PRN Tramadol given. On telemetry with sinus rhythm with 1st degree block. Complaint with fluid restriction.

## 2023-05-15 NOTE — PROGRESS NOTES
[facility]    Medicine Progress Note - Hospitalist Service    Date of Admission:  5/9/2023    Assessment & Plan   Vashti King is a 77 year old male admitted on 5/9/2023. He has a history of hypertension, diabetes, BPH, CKD presented with nausea and vomiting.  Patient was seen a couple days ago in the ED for burning sensation of the hands and feet and he was started on gabapentin on 5/7/2023.  He stated that he started to have this nausea vomiting after starting the medication.  Per the son at the bedside, patient does not have mental status changes or seizure activity.  Otherwise she denies abdominal pain, urinary or bowel habit changes, fever or chills, no cough shortness of breath, chest pain, or bilateral leg swelling. In the ED, sodium was 120 down from 136 three days prior.     Acute hyponatremia  -In the setting of hydrochlorothiazide use and nausea and vomiting  -Severe sodium fluctuations and appreciate nephrology management  -Transferred to ICU on 5/10 and on hypertonic saline per nephrology management, transferred to floor on 5/13  -Currently on sodium tabs and lasix. IVF stopped.   -Sodium checks and management per nephrology- check sodium daily now.   -PICC line placed on 5/10  -No neurological changes, patient conversating with family and using bedside commode  -PT/OT-->likely need TCU; SW team aware  -Advised patient and family members to not bring any outside food or drinks (he has multiple drinks around his bedside)    Back pain   -Uncontrolled so started on tylenol TID and lidocaine patches   -Tramadol Q6H PRN for severe pain   -This regimen working well     Essential hypertension  -HCTZ held due to hyponatremia  -Continue with lisinopril 40 mg , diltiazem 240mg  -Stop Metoprolol and transition to carvedilol 25 BID-->50 BID  -Hydralazine PRN       Diabetes mellitus II  -Increase lantus to 18 units at bedtime, continue meal time novolog and SSI  -Accu-Cheks, sliding scale,  "lantus  -Hemoglobin A1c 7.9    Polyneuropathy-likely secondary to diabetes mellitus  -Continue with gabapentin 300 BID today- seems to help but concern for drowsiness and doing well on 200 BID    H/o CKD stage IIIa  - cr at admission 0.78 (baseline 1.1-1.4)  -Monitor BMP, I/O    Hyperlipidemia  -Continue with PTA statin    Family updated at bedside        Diet: Combination Diet Moderate Consistent Carb (60 g CHO per Meal) Diet  Fluid restriction 1200 ML FLUID (avoid anusha ice and water)    DVT Prophylaxis: Enoxaparin (Lovenox) SQ  Saldaña Catheter: Not present  Lines: PRESENT      PICC 05/10/23 Double Lumen Left Basilic Vascular Access-Site Assessment: WDL      Cardiac Monitoring: None  Code Status: Full Code      Clinically Significant Risk Factors        # Hypokalemia: Lowest K = 3.2 mmol/L in last 2 days, will replace as needed  # Hyponatremia: Lowest Na = 125 mmol/L in last 2 days, will monitor as appropriate  # Hypocalcemia: Lowest Ca = 8.4 mg/dL in last 2 days, will monitor and replace as appropriate               # DMII: A1C = 7.9 % (Ref range: <5.7 %) within past 6 months   # Overweight: Estimated body mass index is 29.86 kg/m  as calculated from the following:    Height as of this encounter: 1.651 m (5' 5\").    Weight as of this encounter: 81.4 kg (179 lb 7.3 oz).          Disposition Plan     Expected Discharge Date: 05/16/2023                Griselda Cote MD  Hospitalist Service  [facility]  Securely message with [employer] (more info)  Text page via [employer] Paging/Directory   ______________________________________________________________________    Interval History   Complaining of back pain- in agreement with pain regimen above.   Feeling very weak overall, questioning if he has to go to TCU    Physical Exam   Vital Signs: Temp: 98.3  F (36.8  C) Temp src: Oral BP: 137/82 Pulse: 62   Resp: 16 SpO2: 97 % O2 Device: None (Room air)    Weight: 179 lbs 7.27 oz    Physical " Exam  Constitutional:       General: He is not in acute distress.     Appearance: He is obese. He is not toxic-appearing.   HENT:      Head: Normocephalic and atraumatic.      Mouth/Throat:      Mouth: Mucous membranes are moist.   Cardiovascular:      Rate and Rhythm: Normal rate and regular rhythm.   Abdominal:      Palpations: Abdomen is soft.      Tenderness: There is no abdominal tenderness. There is no guarding or rebound.   Musculoskeletal:         General: No swelling.   Neurological:      General: No focal deficit present.      Mental Status: He is alert and oriented to person, place, and time.       Medical Decision Making       30 MINUTES SPENT BY ME on the date of service doing chart review, history, exam, documentation & further activities per the note.      Data     I have personally reviewed the following data over the past 24 hrs:    N/A  \   N/A   / N/A     129 (L); 129 (L) 95 (L) 17.8 /  107 (H)   3.2 (L) 27 0.77 \       ALT: N/A AST: N/A AP: N/A TBILI: N/A   ALB: 3.6 TOT PROTEIN: N/A LIPASE: N/A       Imaging results reviewed over the past 24 hrs:   No results found for this or any previous visit (from the past 24 hour(s)).

## 2023-05-15 NOTE — SIGNIFICANT EVENT
1115 pt was found sitting on floor- was up in chair and spouse in room trying to help him back to bed- No injuries seen- helped back to bed and vitals being obtained now- Md updated- pt reports legs went weak ans slumped to floor into bottom.   Bed alarm applied, will obtain chair alarm for when up, educated pt/family regarding high falls risk and at this point staff only to assist with transfers.

## 2023-05-15 NOTE — PROGRESS NOTES
Notified MD at 0548 regarding Potassium of 3.2 not on protocol.     Spoke with: Dr. Phan    Orders were obtained.

## 2023-05-15 NOTE — PROGRESS NOTES
Care Management Follow Up    Length of Stay (days): 6    Expected Discharge Date: 05/16/2023     Concerns to be Addressed:       Patient plan of care discussed at interdisciplinary rounds: Yes    Anticipated Discharge Disposition:  Transitional care     Anticipated Discharge Services:  Therapy services  Anticipated Discharge DME:  Per treatment team    Patient/family educated on Medicare website which has current facility and service quality ratings:  Yes  Education Provided on the Discharge Plan:  Yes  Patient/Family in Agreement with the Plan:  Yes    Referrals Placed by CM/SW:  TCU  Private pay costs discussed: Not applicable    Additional Information:  CM met with patient and son in room to get TCU choices. Son asks that referrals be sent to [facility] and [facility] as those are his top two choices. He also asked that referrals be sent to [facility]. Referrals sent.     Social History  Patient lives with family and is independent with ADLs at baseline. Therapy rec is TCU.      ARTURO NavarreteW

## 2023-05-16 ENCOUNTER — APPOINTMENT (OUTPATIENT)
Dept: OCCUPATIONAL THERAPY | Facility: HOSPITAL | Age: 78
DRG: 644 | End: 2023-05-16
Payer: MEDICARE

## 2023-05-16 ENCOUNTER — APPOINTMENT (OUTPATIENT)
Dept: PHYSICAL THERAPY | Facility: HOSPITAL | Age: 78
DRG: 644 | End: 2023-05-16
Payer: MEDICARE

## 2023-05-16 ENCOUNTER — LAB REQUISITION (OUTPATIENT)
Dept: LAB | Facility: CLINIC | Age: 78
End: 2023-05-16
Payer: MEDICARE

## 2023-05-16 VITALS
DIASTOLIC BLOOD PRESSURE: 78 MMHG | BODY MASS INDEX: 29.9 KG/M2 | OXYGEN SATURATION: 96 % | SYSTOLIC BLOOD PRESSURE: 154 MMHG | TEMPERATURE: 98.2 F | WEIGHT: 179.45 LBS | HEIGHT: 65 IN | HEART RATE: 56 BPM | RESPIRATION RATE: 16 BRPM

## 2023-05-16 DIAGNOSIS — Z11.1 ENCOUNTER FOR SCREENING FOR RESPIRATORY TUBERCULOSIS: ICD-10-CM

## 2023-05-16 LAB
ANION GAP SERPL CALCULATED.3IONS-SCNC: 8 MMOL/L (ref 7–15)
BUN SERPL-MCNC: 16.8 MG/DL (ref 8–23)
CALCIUM SERPL-MCNC: 8.5 MG/DL (ref 8.8–10.2)
CHLORIDE SERPL-SCNC: 96 MMOL/L (ref 98–107)
CREAT SERPL-MCNC: 0.69 MG/DL (ref 0.67–1.17)
DEPRECATED HCO3 PLAS-SCNC: 28 MMOL/L (ref 22–29)
GFR SERPL CREATININE-BSD FRML MDRD: >90 ML/MIN/1.73M2
GLUCOSE BLDC GLUCOMTR-MCNC: 172 MG/DL (ref 70–99)
GLUCOSE BLDC GLUCOMTR-MCNC: 94 MG/DL (ref 70–99)
GLUCOSE SERPL-MCNC: 85 MG/DL (ref 70–99)
PHOSPHATE SERPL-MCNC: 2.8 MG/DL (ref 2.5–4.5)
POTASSIUM SERPL-SCNC: 3.3 MMOL/L (ref 3.4–5.3)
SODIUM SERPL-SCNC: 132 MMOL/L (ref 136–145)

## 2023-05-16 PROCEDURE — 250N000013 HC RX MED GY IP 250 OP 250 PS 637: Performed by: STUDENT IN AN ORGANIZED HEALTH CARE EDUCATION/TRAINING PROGRAM

## 2023-05-16 PROCEDURE — 97530 THERAPEUTIC ACTIVITIES: CPT | Mod: GO

## 2023-05-16 PROCEDURE — 84100 ASSAY OF PHOSPHORUS: CPT | Performed by: STUDENT IN AN ORGANIZED HEALTH CARE EDUCATION/TRAINING PROGRAM

## 2023-05-16 PROCEDURE — 99239 HOSP IP/OBS DSCHRG MGMT >30: CPT | Performed by: HOSPITALIST

## 2023-05-16 PROCEDURE — 250N000013 HC RX MED GY IP 250 OP 250 PS 637: Performed by: INTERNAL MEDICINE

## 2023-05-16 PROCEDURE — 82310 ASSAY OF CALCIUM: CPT | Performed by: INTERNAL MEDICINE

## 2023-05-16 PROCEDURE — 97116 GAIT TRAINING THERAPY: CPT | Mod: GP

## 2023-05-16 PROCEDURE — 97110 THERAPEUTIC EXERCISES: CPT | Mod: GO

## 2023-05-16 PROCEDURE — 97110 THERAPEUTIC EXERCISES: CPT | Mod: GP

## 2023-05-16 PROCEDURE — 99232 SBSQ HOSP IP/OBS MODERATE 35: CPT | Performed by: INTERNAL MEDICINE

## 2023-05-16 RX ORDER — LIDOCAINE 4 G/G
1 PATCH TOPICAL EVERY 24 HOURS
Qty: 10 PATCH | Refills: 0 | Status: SHIPPED | OUTPATIENT
Start: 2023-05-16 | End: 2023-05-19 | Stop reason: ALTCHOICE

## 2023-05-16 RX ORDER — CARVEDILOL 25 MG/1
50 TABLET ORAL 2 TIMES DAILY WITH MEALS
Qty: 60 TABLET | Refills: 0 | Status: SHIPPED | OUTPATIENT
Start: 2023-05-16 | End: 2023-06-22

## 2023-05-16 RX ORDER — POTASSIUM CHLORIDE 1500 MG/1
20 TABLET, EXTENDED RELEASE ORAL DAILY
Qty: 30 TABLET | Refills: 0 | Status: SHIPPED | OUTPATIENT
Start: 2023-05-16 | End: 2023-06-22

## 2023-05-16 RX ORDER — FUROSEMIDE 20 MG
20 TABLET ORAL
Qty: 60 TABLET | Refills: 0 | Status: SHIPPED | OUTPATIENT
Start: 2023-05-16 | End: 2023-06-06

## 2023-05-16 RX ORDER — GABAPENTIN 100 MG/1
200 CAPSULE ORAL AT BEDTIME
Qty: 30 CAPSULE | Refills: 0 | Status: SHIPPED | OUTPATIENT
Start: 2023-05-16 | End: 2023-08-08

## 2023-05-16 RX ORDER — ACETAMINOPHEN 325 MG/1
975 TABLET ORAL 3 TIMES DAILY
Qty: 180 TABLET | Refills: 0 | Status: SHIPPED | OUTPATIENT
Start: 2023-05-16 | End: 2023-06-06

## 2023-05-16 RX ORDER — POLYETHYLENE GLYCOL 3350 17 G/17G
17 POWDER, FOR SOLUTION ORAL DAILY
Qty: 510 G | Refills: 0 | Status: SHIPPED | OUTPATIENT
Start: 2023-05-16

## 2023-05-16 RX ORDER — TRAMADOL HYDROCHLORIDE 50 MG/1
50 TABLET ORAL 2 TIMES DAILY PRN
Qty: 6 TABLET | Refills: 0 | Status: SHIPPED | OUTPATIENT
Start: 2023-05-16 | End: 2023-05-18

## 2023-05-16 RX ORDER — AMOXICILLIN 250 MG
1 CAPSULE ORAL 2 TIMES DAILY
Qty: 60 TABLET | Refills: 0 | Status: SHIPPED | OUTPATIENT
Start: 2023-05-16 | End: 2023-08-08

## 2023-05-16 RX ORDER — SODIUM CHLORIDE 1 G/1
2 TABLET ORAL 2 TIMES DAILY WITH MEALS
Qty: 60 TABLET | Refills: 0 | Status: SHIPPED | OUTPATIENT
Start: 2023-05-16 | End: 2023-06-06

## 2023-05-16 RX ADMIN — ASPIRIN 81 MG: 81 TABLET, COATED ORAL at 08:04

## 2023-05-16 RX ADMIN — DOCUSATE SODIUM 50 MG AND SENNOSIDES 8.6 MG 1 TABLET: 8.6; 5 TABLET, FILM COATED ORAL at 08:05

## 2023-05-16 RX ADMIN — ATORVASTATIN CALCIUM 40 MG: 40 TABLET, FILM COATED ORAL at 08:06

## 2023-05-16 RX ADMIN — SODIUM CHLORIDE TAB 1 GM 2 G: 1 TAB at 08:05

## 2023-05-16 RX ADMIN — ACETAMINOPHEN 975 MG: 325 TABLET, FILM COATED ORAL at 13:01

## 2023-05-16 RX ADMIN — LIDOCAINE 1 PATCH: 4 PATCH TOPICAL at 08:04

## 2023-05-16 RX ADMIN — POTASSIUM & SODIUM PHOSPHATES POWDER PACK 280-160-250 MG 1 PACKET: 280-160-250 PACK at 12:19

## 2023-05-16 RX ADMIN — INSULIN ASPART 2 UNITS: 100 INJECTION, SOLUTION INTRAVENOUS; SUBCUTANEOUS at 12:19

## 2023-05-16 RX ADMIN — FUROSEMIDE 20 MG: 20 TABLET ORAL at 08:05

## 2023-05-16 RX ADMIN — POTASSIUM & SODIUM PHOSPHATES POWDER PACK 280-160-250 MG 1 PACKET: 280-160-250 PACK at 08:04

## 2023-05-16 RX ADMIN — TRAMADOL HYDROCHLORIDE 50 MG: 50 TABLET ORAL at 13:01

## 2023-05-16 RX ADMIN — Medication 1 MG: at 02:19

## 2023-05-16 RX ADMIN — LISINOPRIL 40 MG: 20 TABLET ORAL at 08:08

## 2023-05-16 RX ADMIN — DILTIAZEM HYDROCHLORIDE 240 MG: 120 CAPSULE, COATED, EXTENDED RELEASE ORAL at 08:08

## 2023-05-16 RX ADMIN — ACETAMINOPHEN 975 MG: 325 TABLET, FILM COATED ORAL at 08:09

## 2023-05-16 RX ADMIN — TRAMADOL HYDROCHLORIDE 50 MG: 50 TABLET ORAL at 04:47

## 2023-05-16 RX ADMIN — CARVEDILOL 50 MG: 25 TABLET, FILM COATED ORAL at 08:06

## 2023-05-16 ASSESSMENT — ACTIVITIES OF DAILY LIVING (ADL)
ADLS_ACUITY_SCORE: 43
ADLS_ACUITY_SCORE: 47
ADLS_ACUITY_SCORE: 47
ADLS_ACUITY_SCORE: 43
ADLS_ACUITY_SCORE: 47
ADLS_ACUITY_SCORE: 43
ADLS_ACUITY_SCORE: 47

## 2023-05-16 NOTE — PLAN OF CARE
Pt is alert and oriented, hmong speaking. Family able to help translate but I used  at start of shift. Son sleeping overnight. Pt's BP is elevated, scheduled BP meds given. Pt was taken back to bed via lalitha at start of shift. Education reinforced regarding use of call light and calling staff for help with activity. Pt verbalized understanding. Pt uses the urinal but had one accident tonight, pad and brief changed. C/o pain in lower back and some in the buttocks. Scheduled tylenol given. Pt also took PRN tramadol at bedtime.     Problem: Plan of Care - These are the overarching goals to be used throughout the patient stay.    Goal: Absence of Hospital-Acquired Illness or Injury  Outcome: Progressing  Intervention: Identify and Manage Fall Risk  Recent Flowsheet Documentation  Taken 5/15/2023 2000 by Chastity Sosa RN  Safety Promotion/Fall Prevention:   activity supervised   assistive device/personal items within reach   mobility aid in reach   nonskid shoes/slippers when out of bed   patient and family education   room door open   safety round/check completed  Taken 5/15/2023 1545 by Chastity Sosa, RN  Safety Promotion/Fall Prevention:   activity supervised   assistive device/personal items within reach   mobility aid in reach   nonskid shoes/slippers when out of bed   patient and family education   room door open   safety round/check completed     Problem: Diabetes Comorbidity  Goal: Blood Glucose Level Within Targeted Range  Outcome: Progressing     Problem: Hypertension Acute  Goal: Blood Pressure Within Desired Range  Outcome: Progressing  Intervention: Normalize Blood Pressure  Recent Flowsheet Documentation  Taken 5/15/2023 2000 by Chastity Sosa, RN  Medication Review/Management: medications reviewed  Taken 5/15/2023 1545 by Chastity Sosa RN  Medication Review/Management: medications reviewed     Problem: Plan of Care - These are the overarching goals to be used throughout the  patient stay.    Goal: Optimal Comfort and Wellbeing  Intervention: Provide Person-Centered Care  Recent Flowsheet Documentation  Taken 5/15/2023 2000 by Chastity Sosa RN  Trust Relationship/Rapport:   questions answered   care explained  Taken 5/15/2023 1545 by Chastity Sosa RN  Trust Relationship/Rapport:   questions answered   care explained     Problem: Risk for Delirium  Goal: Optimal Coping  Intervention: Optimize Psychosocial Adjustment to Delirium  Recent Flowsheet Documentation  Taken 5/15/2023 2000 by Chastity Sosa RN  Family/Support System Care: presence promoted  Taken 5/15/2023 1545 by Chastity Sosa RN  Family/Support System Care: presence promoted  Goal: Improved Behavioral Control  Intervention: Minimize Safety Risk  Recent Flowsheet Documentation  Taken 5/15/2023 2000 by Chastity Sosa RN  Enhanced Safety Measures: family to remain at bedside  Trust Relationship/Rapport:   questions answered   care explained  Taken 5/15/2023 1545 by Chastity Sosa RN  Enhanced Safety Measures: family to remain at bedside  Trust Relationship/Rapport:   questions answered   care explained  Goal: Improved Attention and Thought Clarity  Intervention: Maximize Cognitive Function  Recent Flowsheet Documentation  Taken 5/15/2023 2000 by Chastity Sosa RN  Reorientation Measures: clock in view  Taken 5/15/2023 1545 by Chastity Sosa RN  Reorientation Measures: clock in view   Goal Outcome Evaluation:

## 2023-05-16 NOTE — PROGRESS NOTES
Care Management Discharge Note    Discharge Date: 05/16/2023       Discharge Disposition: Transitional Care    Discharge Services: None    Discharge DME: None    Discharge Transportation: [facility] transport scheduled between [id number]    Private pay costs discussed: transportation costs    Does the patient's insurance plan have a 3 day qualifying hospital stay waiver?  Yes   Will the waiver be used for post-acute placement? Yes    PAS Confirmation Code: [id number]  Patient/family educated on Medicare website which has current facility and service quality ratings: yes    Education Provided on the Discharge Plan: Yes  Persons Notified of Discharge Plans: patient; family   Patient/Family in Agreement with the Plan: yes    Handoff Referral Completed: Yes    Additional Information:  9:36 AM  SANDRA called and left a VM with [facility] requesting a phone call back to discuss Pt's TCU referral. SANDRA called [facility] and spoke to Gladis regarding Pt's TCU referral. Gladis reported she will review Pt's referral and call SANDRA back.     SANDRA received a phone call back from Gladis who reported she would be able to accept Pt this afternoon if he is medically ready. SANDRA reached out to Hospitalist to confirm Pt would be ready for discharge today. SANDRA spoke with Pt's RN who confirmed Pt would need [facility] wheelchair transport and that family would be agreeable to the potential out-of-pocket costs. [facility] transport scheduled between [id number].    10:27 AM  SANDRA attempted to visit with Pt and his family to discuss discharge plans but was unable as Pt was meeting with a different provide at the time. SANDRA updated Pt's bedside RN on discharge plans and timing. RN reported she informed family of discharge placement and that family is agreeable to Pt's discharge plans and timing.    SANDRA called Glad[facility] and confirmed Pt's discharge plan and timing would work for the facility. Orders send through [facility]. PAS  completed.    ANILA Crouch

## 2023-05-16 NOTE — PLAN OF CARE
"Problem: Plan of Care - These are the overarching goals to be used throughout the patient stay.    Goal: Plan of Care Review  Description: The Plan of Care Review/Shift note should be completed every shift.  The Outcome Evaluation is a brief statement about your assessment that the patient is improving, declining, or no change.  This information will be displayed automatically on your shift note.  Outcome: Progressing     Problem: Electrolyte Imbalance  Goal: Electrolyte Imbalance: Plan of Care  Outcome: Progressing     Problem: Hypertension Acute  Goal: Blood Pressure Within Desired Range  Outcome: Progressing   Goal Outcome Evaluation:    Vashti Hanley speaking, son at bedside, alert and oriented, VSS, PRN tramadol given, slept between cares    Vital signs:  Temp: 98.2  F (36.8  C) Temp src: Axillary BP: 122/68 Pulse: 71   Resp: 16 SpO2: 92 % O2 Device: None (Room air)   Height: 165.1 cm (5' 5\") Weight: 81.4 kg (179 lb 7.3 oz)  Estimated body mass index is 29.86 kg/m  as calculated from the following:    Height as of this encounter: 1.651 m (5' 5\").    Weight as of this encounter: 81.4 kg (179 lb 7.3 oz).                              "

## 2023-05-16 NOTE — PROGRESS NOTES
RENAL  NOTE     REQUESTING PHYSICIAN: Dr. Torres    REASON FOR CONSULT: Hyponatremia     ASSESSMENT/PLAN:  Acute hyponatremia - Baseline normonatremic, including at ED visit 5/6/2023 when Na+ was 136.   Acutely down to 120 5/9/23 in the setting of uncontrolled pain, nausea, and hydrochlorothiazide use, NSAID x 1 since admit. Likely due to excess ADH exacerbated by thiazide use, supported by Osm studies (urine Osm 822, serum Osm 262, Urine Na 43 in the setting of thiazide diuretic). TSH WNL 5/6/2023.  Ongoing very high urine osm c/w ongoing inappropriate ADH activity(uosm 668 and Gato 44). IVFs stopped and changed to oral management.  Ongoing ADH from pain? ?gabapentin contributing.  Permanently avoid thiazide diuretics and NSAIDS in future.  Sodium up to 132 today, suspect thiazide effect and ADH is finally wearing off.  Recs:  - continue salt tabs and lasix as ordered for discharge  - can relax fluid restriction to 1500cc   - in the long term, suspect we won't need the fluid restriction and salt tabs  - will arrange f/u in clinic in 1-2 weeks and likely wean back salt tabs at that point  - okay to discharge, would get BMP in 2 days  - discussed with Dr. Pulido    Hypokalemia - related to diuretics, salt loading, on Kphos replacement, would discharge on 40meq daily of KCl and repeat K in a few days    Polyneuropathy - Long-standing history of DM in stocking/glove distribution makes diabetic neuropathy likely.   Started gabapentin just prior to admit 5/5. ?contributed to onset / ongoing hyponatremia?  Risk benefit prob favors continuing gabapentin and seemed to tolerated nighttime dosing     Hypertension - Now on carvedilol,  lisinopril 40 mg, diltiazem  mg and lasix.  Now well controlled.      Type 2 diabetes - BG's here mostly  </=200 so Na+ correction for hyperglycemia is negligible. Management per primary service.     CKD 3a - Baseline Cr 1.1-1.4, eGFR 55-60+. Currently Cr improved from baseline.    Creat much better now ?due to better hydration in hosp (+IVF and off hydrochlorothiazide?)    Hyperlipidemia - on statin         HPI: Awake and alert and up in chair. Son here.  Used  and discussed plan with Dr. Pulido in the room.  Sodium 132.  Going to TCU later today.  Discussed we can relax fluid restriction a little and then taper off salt tabs in the near term.    REVIEW OF SYSTEMS:  Comprehensive ROS negative except per HPI     ALLERGIES/SENSITIVITIES:  Allergies   Allergen Reactions     Amlodipine Swelling     Swelling in legs      Hydrochlorothiazide Other (See Comments)     hyponatremia     Social History     Tobacco Use     Smoking status: Never     Smokeless tobacco: Never   Substance Use Topics     Drug use: No     I have reviewed this patient's family history and updated it with pertinent information if needed.  Family History   Problem Relation Age of Onset     Diabetes Sister      Hypertension Sister      Diabetes Brother      Hypertension Brother      Cerebrovascular Disease Brother          PHYSICAL EXAM:  Physical Exam   Temp: 98.2  F (36.8  C) Temp src: Axillary BP: (!) 154/78 Pulse: 56   Resp: 16 SpO2: 96 % O2 Device: None (Room air)    Vitals:    05/09/23 0752 05/13/23 0300   Weight: 81.6 kg (180 lb) 81.4 kg (179 lb 7.3 oz)     Vital Signs with Ranges  Temp:  [97.1  F (36.2  C)-98.5  F (36.9  C)] 98.2  F (36.8  C)  Pulse:  [56-71] 56  Resp:  [16-18] 16  BP: (104-156)/(68-92) 154/78  SpO2:  [91 %-97 %] 96 %  I/O last 3 completed shifts:  In: 260 [P.O.:240; I.V.:20]  Out: 1350 [Urine:1350]    @TMAXR(24)@    No data found.    General - alert, sitting up in chair  HEENT - AT NC mmm  Neck - no JVD  Respiratory - Lungs CTA bilat    Cardiovascular - AP RRR  Abdomen - soft,  non tender.   Extremities - well perfused, no edema  Integumentary - intact, good turgor, no rash/lesions  Neurologic - awake and nonfocal    Laboratory:     Recent Labs   Lab 05/10/23  0529   WBC 8.1   RBC 5.11   HGB  15.3   HCT 41.5          Basic Metabolic Panel:  Recent Labs   Lab 05/16/23  0738 05/16/23  0644 05/15/23  2111 05/15/23  1717 05/15/23  1146 05/15/23  0738 05/15/23  0513 05/14/23  2139 05/14/23  1753 05/14/23  0741 05/14/23  0618 05/13/23  2107 05/13/23  1824 05/13/23  0711 05/13/23  0610 05/12/23  0746 05/12/23  0441 05/10/23  2221 05/10/23  2018 05/10/23  1819 05/10/23  1651 05/10/23  1546   NA  --  132*  --   --   --   --  129*  129*  --  125*  --  128*  --  126*  --  126*   < > 123*  123*   < > 113* 112*  --  112*   POTASSIUM  --  3.3*  --   --   --   --  3.2*  --   --   --   --   --   --   --   --   --  3.8  --  3.9 4.2  --  4.0   CHLORIDE  --  96*  --   --   --   --  95*  --   --   --   --   --   --   --   --   --  91*  --  80* 79*  --  78*   CO2  --  28  --   --   --   --  27  --   --   --   --   --   --   --   --   --  20*  --  19* 19*  --  21*   BUN  --  16.8  --   --   --   --  17.8  --   --   --   --   --   --   --   --   --  15.8  --  18.3 18.3  --  17.9   CR  --  0.69  --   --   --   --  0.77  --   --   --   --   --   --   --   --   --  0.55*  --  0.71 0.66*  --  0.68   GLC 94 85 229* 239* 151* 107* 124*   < >  --    < >  --    < >  --    < >  --    < > 137*   < > 170* 214*   < > 146*   HELEN  --  8.5*  --   --   --   --  8.4*  --   --   --   --   --   --   --   --   --  8.2*  --  8.6* 8.5*  --  8.8    < > = values in this interval not displayed.       INRNo lab results found in last 7 days.    Recent Labs   Lab Test 05/09/23  0847 05/06/23  1856   POTASSIUM 4.0 4.4   CHLORIDE 81* 96*   BUN 15.5 16.1      Recent Labs   Lab Test 05/09/23  1155 05/09/23  0847 12/06/22  0951 08/30/22  1010   ALBUMIN  --  4.5 4.5  --    BILITOTAL  --  1.6* 0.7  --    ALT  --  21 23  --    AST  --  29 39  --    PROTEIN 20*  --   --  Negative       Personally reviewed today's laboratory studies      Thank you for involving us in the care of this patient. We will continue to follow along with you.    [facility]  Nitin  [facility]  [phone]

## 2023-05-16 NOTE — PLAN OF CARE
Goal Outcome Evaluation:      Plan of Care Reviewed With: patient, family    Overall Patient Progress: improvingOverall Patient Progress: improving    Outcome Evaluation: Labs stabilized, pt glucose levels being treated with sliding scale and carb counting. tolerating regular diet- denies N/V. back pain achy- tylenol and PRN tramadol. working with PT/OT. to dc to TCU this afternoon      Problem: Plan of Care - These are the overarching goals to be used throughout the patient stay.    Goal: Readiness for Transition of Care  Outcome: Adequate for Care Transition      None

## 2023-05-16 NOTE — PLAN OF CARE
Occupational Therapy Discharge Summary    Reason for therapy discharge:    Discharged to transitional care facility.    Progress towards therapy goal(s). See goals on Care Plan in [facility] electronic health record for goal details.  Goals not met.  Barriers to achieving goals:   discharge from facility.    Therapy recommendation(s):    Continued therapy is recommended.  Rationale/Recommendations:  recommend continued OT services at TCU to promote ability to perform ADLs and functional mobility.

## 2023-05-17 LAB — HOLD SPECIMEN: NORMAL

## 2023-05-17 PROCEDURE — P9603 ONE-WAY ALLOW PRORATED MILES: HCPCS | Performed by: FAMILY MEDICINE

## 2023-05-17 PROCEDURE — 36415 COLL VENOUS BLD VENIPUNCTURE: CPT | Performed by: FAMILY MEDICINE

## 2023-05-17 PROCEDURE — 86481 TB AG RESPONSE T-CELL SUSP: CPT | Performed by: FAMILY MEDICINE

## 2023-05-18 DIAGNOSIS — M54.50 ACUTE MIDLINE LOW BACK PAIN WITHOUT SCIATICA: ICD-10-CM

## 2023-05-18 DIAGNOSIS — E11.29 TYPE 2 DIABETES MELLITUS WITH MICROALBUMINURIA, WITHOUT LONG-TERM CURRENT USE OF INSULIN (H): ICD-10-CM

## 2023-05-18 DIAGNOSIS — R80.9 TYPE 2 DIABETES MELLITUS WITH MICROALBUMINURIA, WITHOUT LONG-TERM CURRENT USE OF INSULIN (H): ICD-10-CM

## 2023-05-18 RX ORDER — GLIPIZIDE 5 MG/1
TABLET ORAL
Qty: 360 TABLET | Refills: 0 | Status: SHIPPED | OUTPATIENT
Start: 2023-05-18 | End: 2023-06-02

## 2023-05-18 RX ORDER — TRAMADOL HYDROCHLORIDE 50 MG/1
50 TABLET ORAL 2 TIMES DAILY PRN
Qty: 30 TABLET | Refills: 0 | Status: SHIPPED | OUTPATIENT
Start: 2023-05-18 | End: 2023-07-12

## 2023-05-18 NOTE — TELEPHONE ENCOUNTER
"Last Written Prescription Date:  11/21/2022  Last Fill Quantity: 360,  # refills: 1   Last office visit provider:  8/30/2022     Requested Prescriptions   Pending Prescriptions Disp Refills     glipiZIDE (GLUCOTROL) 5 MG tablet [Pharmacy Med Name: GLIPIZIDE 5 MG TABLET] 360 tablet 1     Sig: TAKE 2 TABLETS BY MOUTH TWICE A DAY WITH MEALS       Sulfonylurea Agents Passed - 5/18/2023 12:24 AM        Passed - Patient has documented A1c within the specified period of time.     If HgbA1C is 8 or greater, it needs to be on file within the past 3 months.  If less than 8, must be on file within the past 6 months.     Recent Labs   Lab Test 05/09/23  0847   A1C 7.9*             Passed - Medication is active on med list        Passed - Patient is age 18 or older        Passed - Patient has a recent creatinine (normal) within the past 12 mos.     Recent Labs   Lab Test 05/16/23  0644   CR 0.69       Ok to refill medication if creatinine is low          Passed - Recent (6 mo) or future (30 days) visit within the authorizing provider's specialty     Patient had office visit in the last 6 months or has a visit in the next 30 days with authorizing provider or within the authorizing provider's specialty.  See \"Patient Info\" tab in inbasket, or \"Choose Columns\" in Meds & Orders section of the refill encounter.                 Gladis Werner RN 05/18/23 3:40 PM  "

## 2023-05-19 ENCOUNTER — TRANSITIONAL CARE UNIT VISIT (OUTPATIENT)
Dept: GERIATRICS | Facility: CLINIC | Age: 78
End: 2023-05-19
Payer: MEDICARE

## 2023-05-19 VITALS
HEIGHT: 65 IN | WEIGHT: 171 LBS | BODY MASS INDEX: 28.49 KG/M2 | RESPIRATION RATE: 18 BRPM | HEART RATE: 67 BPM | TEMPERATURE: 98.1 F | OXYGEN SATURATION: 94 % | SYSTOLIC BLOOD PRESSURE: 108 MMHG | DIASTOLIC BLOOD PRESSURE: 71 MMHG

## 2023-05-19 DIAGNOSIS — E11.65 TYPE 2 DIABETES MELLITUS WITH HYPERGLYCEMIA, WITHOUT LONG-TERM CURRENT USE OF INSULIN (H): ICD-10-CM

## 2023-05-19 DIAGNOSIS — I10 ESSENTIAL HYPERTENSION: ICD-10-CM

## 2023-05-19 DIAGNOSIS — R53.81 PHYSICAL DECONDITIONING: Primary | ICD-10-CM

## 2023-05-19 DIAGNOSIS — M54.16 LUMBAR BACK PAIN WITH RADICULOPATHY AFFECTING LOWER EXTREMITY: ICD-10-CM

## 2023-05-19 DIAGNOSIS — E22.2 SIADH (SYNDROME OF INAPPROPRIATE ADH PRODUCTION) (H): ICD-10-CM

## 2023-05-19 DIAGNOSIS — E87.1 HYPONATREMIA: ICD-10-CM

## 2023-05-19 PROBLEM — N18.31 CHRONIC KIDNEY DISEASE, STAGE 3A (H): Status: RESOLVED | Noted: 2022-08-30 | Resolved: 2023-05-19

## 2023-05-19 LAB
GAMMA INTERFERON BACKGROUND BLD IA-ACNC: 0.12 IU/ML
M TB IFN-G BLD-IMP: NEGATIVE
M TB IFN-G CD4+ BCKGRND COR BLD-ACNC: 9.88 IU/ML
MITOGEN IGNF BCKGRD COR BLD-ACNC: 0.01 IU/ML
MITOGEN IGNF BCKGRD COR BLD-ACNC: 0.09 IU/ML
QUANTIFERON MITOGEN: 10 IU/ML
QUANTIFERON NIL TUBE: 0.12 IU/ML
QUANTIFERON TB1 TUBE: 0.21 IU/ML
QUANTIFERON TB2 TUBE: 0.13

## 2023-05-19 PROCEDURE — 99310 SBSQ NF CARE HIGH MDM 45: CPT | Performed by: NURSE PRACTITIONER

## 2023-05-19 NOTE — LETTER
5/19/2023        RE: Vashti King  [address] [place] [address]        [facility] GERIATRICS    PRIMARY CARE PROVIDER AND CLINIC:  Edison Banda MD, [address] / [place] [address]  Chief Complaint   Patient presents with     Hospital F/U      [facility] Medical Record Number:  [id number]  Place of Service where encounter took place:  [facility] ([facility]) [[id number]]    Vashti King  is a 77 year old  (1945), admitted to the above facility from  [facility]. Hospital stay 5/9/23 through 5/16/23. Patient with PMH HTN, DM2, BPH presented to the ED with N/V. He was seen in the ED a few days prior for burning sensation of hands and feet and was started on gabapentin. He was found to have sodium level of 120 on admission. Hydrochlorothiazide was stopped. Sodium dropped as low as 111. Currently on sodium tabs, fluid restriction and lasix. Gabapentin reduced. Due to uncontrolled back pain, started on Tylenol and lidocaine patches. Tramadol prn ordered for severe pain.     HPI obtained from patient visit, review of nursing home record, discussion with facility staff, and Epic review. Patient is seen with his son who provides translating    HPI:    Patient's predominant concern today is his back and leg pain. The leg pain is in his thighs, it is a burning pain. He has had difficulty sleeping due to the back pain. He says his pain was better controlled in the hospital. In reviewing his medications, he does not think the lidocaine patch is helping. He was not aware he had to ask for the tramadol, so he has not had this. He is advised to ask for it when needed, but this is expected to be short term as he improves. This pain is acute, was not an issue at home. His son asks appropriately if this will improve with increased strength and therapy, and we advise Vashti that it will. Lengthy discussion had regarding medications and medical issues. He is advised that the gabapentin  is for the nerve (leg) pain and it can take several weeks for it to reach full effect. If the pain is not improved after several weeks, increasing the medication may not be safe, so options may be limited.  -130s/60-80s  HR 60-70s  BG frequently 200s    CODE STATUS/ADVANCE DIRECTIVES DISCUSSION:  Full Code  ALLERGIES:   Allergies   Allergen Reactions     Amlodipine Swelling     Swelling in legs      Hydrochlorothiazide Other (See Comments)     hyponatremia      PAST MEDICAL HISTORY:   Past Medical History:   Diagnosis Date     BPH (benign prostatic hyperplasia)      Diabetes mellitus (H)      Diabetes mellitus, type II (H)      High cholesterol      Hyperlipidemia      Hypertension       PAST SURGICAL HISTORY:   has a past surgical history that includes PICC/Midline Placement (5/10/2023).  FAMILY HISTORY: family history includes Cerebrovascular Disease in his brother; Diabetes in his brother and sister; Hypertension in his brother and sister.  SOCIAL HISTORY:   reports that he has never smoked. He has never used smokeless tobacco. He reports that he does not use drugs.       Post Discharge Medication Reconciliation Status:   MED REC REQUIRED  Post Medication Reconciliation Status:  Discharge medications reconciled, continue medications without change         Current Outpatient Medications   Medication Sig     acetaminophen (TYLENOL) 325 MG tablet Take 3 tablets (975 mg) by mouth 3 times daily     aspirin (ASA) 81 MG EC tablet Take 1 tablet (81 mg) by mouth daily     atorvastatin (LIPITOR) 40 MG tablet TAKE 1 TABLET BY MOUTH EVERY DAY     carvedilol (COREG) 25 MG tablet Take 2 tablets (50 mg) by mouth 2 times daily (with meals)     diltiazem ER COATED BEADS (CARDIZEM CD/CARTIA XT) 240 MG 24 hr capsule TAKE 1 CAPSULE BY MOUTH EVERY DAY     furosemide (LASIX) 20 MG tablet Take 1 tablet (20 mg) by mouth 2 times daily     gabapentin (NEURONTIN) 100 MG capsule Take 2 capsules (200 mg) by mouth At Bedtime      "glipiZIDE (GLUCOTROL) 5 MG tablet TAKE 2 TABLETS BY MOUTH TWICE A DAY WITH MEALS     INVOKANA 100 MG tablet TAKE 1 TABLET (100 MG) BY MOUTH EVERY MORNING (BEFORE BREAKFAST)     Lidocaine (LIDOCARE) 4 % Patch Place 1 patch onto the skin every 24 hours To prevent lidocaine toxicity, patient should be patch free for 12 hrs daily. Apply patch(s) to back}. Patches may be cut to smaller size prior to removing release liner.  Reminder: Remove previous patch before applying new patch.  NEVER APPLY HEAT OVER PATCH which increases absorption and may lead to local anesthetic toxicity. Do not apply over area where liposomal bupivacaine was injected for 96 hours post injection.     lisinopril (ZESTRIL) 40 MG tablet TAKE 1 TABLET BY MOUTH EVERY DAY     melatonin 1 MG TABS tablet Take 1 tablet (1 mg) by mouth At Bedtime     metFORMIN (GLUCOPHAGE XR) 500 MG 24 hr tablet TAKE 2 TABLETS BY MOUTH TWICE A DAY WITH FOOD     polyethylene glycol (MIRALAX) 17 GM/Dose powder Take 17 g by mouth daily     potassium chloride ER (K-TAB) 20 MEQ CR tablet Take 1 tablet (20 mEq) by mouth daily     senna-docusate (SENOKOT-S/PERICOLACE) 8.6-50 MG tablet Take 1 tablet by mouth 2 times daily     sodium chloride 1 GM tablet Take 2 tablets (2 g) by mouth 2 times daily (with meals)     traMADol (ULTRAM) 50 MG tablet Take 1 tablet (50 mg) by mouth 2 times daily as needed for severe pain     blood glucose (CONTOUR TEST) test strip Use to test blood sugar once daily or as directed.     generic lancets (FINGERSTIX LANCETS) [GENERIC LANCETS (FINGERSTIX LANCETS)] Check blood sugar twice daily.  Uses Insulin.  Diagnosis E11.9     Microlet Lancets MISC 100 each daily Use to test blood sugar once daily.     pen needle, diabetic 32 gauge x 1/4\" Ndle [PEN NEEDLE, DIABETIC 32 GAUGE X 1/4\" NDLE] Use as needed with insulin     No current facility-administered medications for this visit.       ROS:  4 point ROS including Respiratory, CV, GI and , other than that " "noted in the HPI,  is negative    Vitals:  /71   Pulse 67   Temp 98.1  F (36.7  C)   Resp 18   Ht 1.651 m (5' 5\")   Wt 77.6 kg (171 lb)   SpO2 94%   BMI 28.46 kg/m    Exam:  GENERAL APPEARANCE:  Alert, in no distress  ENT:  Mouth and posterior oropharynx normal, moist mucous membranes  EYES:  EOM normal, conjunctiva and lids normal  RESP:  no respiratory distress  CV:  no edema  PSYCH:  insight and judgement impaired, memory impaired     Lab/Diagnostic data:  Recent labs in [facility] reviewed by me today.     ASSESSMENT/PLAN:  (R53.81) Physical deconditioning  (primary encounter diagnosis)  Comment: Due to acute illness  Plan: PT/OT eval and treat, discharge planning per their recommendations.    (M54.16) Lumbar back pain with radiculopathy affecting lower extremity  Comment: The back pain seems to be acute per chart review. The leg pain could be a combination of radiculopathy and neuropathy. Will stop lidocaine patch as this is not effective. Will try diclofenac gel. Hopefully pain will improve over time and with strengthening  Plan: Discontinue lidocaine patch. Diclofenac to low back TID. PT/OT. Continue tramadol prn for now. Monitor back pain    (E87.1) Hyponatremia  (E22.2) SIADH (syndrome of inappropriate ADH production) (H)  Comment: Improving at discharge, but sodium still low  Plan: [facility] 5/22/23. F/u nephrology    (E11.65) Type 2 diabetes mellitus with hyperglycemia, without long-term current use of insulin (H)  Comment: Several BG 200s since admission, but adequate control for age per A1c  Plan: Continue current POC with no changes at this time and adjustments as needed.    (I10) Essential hypertension  Comment: Chronic, controlled  Plan: Continue current POC with no changes at this time and adjustments as needed.      Orders:  Discontinue lidocaine patch  Diclofenac gel 2g to low back TID  BMP 5/22/23    Total time spent with patient visit at the skilled nursing facility was 45 min including patient " visit and review of past records.     Electronically signed by:  BERTO Witt CNP                       Sincerely,        BERTO Witt CNP

## 2023-05-19 NOTE — PROGRESS NOTES
[facility] GERIATRICS    PRIMARY CARE PROVIDER AND CLINIC:  Edison Banda MD, [address] / [place] [address]  Chief Complaint   Patient presents with     Hospital F/U      [facility] Medical Record Number:  [id number]  Place of Service where encounter took place:  [facility] ([facility]) [[id number]]    Vashti King  is a 77 year old  (1945), admitted to the above facility from  [facility]. Hospital stay 5/9/23 through 5/16/23. Patient with PMH HTN, DM2, BPH presented to the ED with N/V. He was seen in the ED a few days prior for burning sensation of hands and feet and was started on gabapentin. He was found to have sodium level of 120 on admission. Hydrochlorothiazide was stopped. Sodium dropped as low as 111. Currently on sodium tabs, fluid restriction and lasix. Gabapentin reduced. Due to uncontrolled back pain, started on Tylenol and lidocaine patches. Tramadol prn ordered for severe pain.     HPI obtained from patient visit, review of nursing home record, discussion with facility staff, and Epic review. Patient is seen with his son who provides translating    HPI:    Patient's predominant concern today is his back and leg pain. The leg pain is in his thighs, it is a burning pain. He has had difficulty sleeping due to the back pain. He says his pain was better controlled in the hospital. In reviewing his medications, he does not think the lidocaine patch is helping. He was not aware he had to ask for the tramadol, so he has not had this. He is advised to ask for it when needed, but this is expected to be short term as he improves. This pain is acute, was not an issue at home. His son asks appropriately if this will improve with increased strength and therapy, and we advise Vashti that it will. Lengthy discussion had regarding medications and medical issues. He is advised that the gabapentin is for the nerve (leg) pain and it can take several weeks for it to reach full effect.  If the pain is not improved after several weeks, increasing the medication may not be safe, so options may be limited.  -130s/60-80s  HR 60-70s  BG frequently 200s    CODE STATUS/ADVANCE DIRECTIVES DISCUSSION:  Full Code  ALLERGIES:   Allergies   Allergen Reactions     Amlodipine Swelling     Swelling in legs      Hydrochlorothiazide Other (See Comments)     hyponatremia      PAST MEDICAL HISTORY:   Past Medical History:   Diagnosis Date     BPH (benign prostatic hyperplasia)      Diabetes mellitus (H)      Diabetes mellitus, type II (H)      High cholesterol      Hyperlipidemia      Hypertension       PAST SURGICAL HISTORY:   has a past surgical history that includes PICC/Midline Placement (5/10/2023).  FAMILY HISTORY: family history includes Cerebrovascular Disease in his brother; Diabetes in his brother and sister; Hypertension in his brother and sister.  SOCIAL HISTORY:   reports that he has never smoked. He has never used smokeless tobacco. He reports that he does not use drugs.       Post Discharge Medication Reconciliation Status:   MED REC REQUIRED  Post Medication Reconciliation Status:  Discharge medications reconciled, continue medications without change         Current Outpatient Medications   Medication Sig     acetaminophen (TYLENOL) 325 MG tablet Take 3 tablets (975 mg) by mouth 3 times daily     aspirin (ASA) 81 MG EC tablet Take 1 tablet (81 mg) by mouth daily     atorvastatin (LIPITOR) 40 MG tablet TAKE 1 TABLET BY MOUTH EVERY DAY     carvedilol (COREG) 25 MG tablet Take 2 tablets (50 mg) by mouth 2 times daily (with meals)     diltiazem ER COATED BEADS (CARDIZEM CD/CARTIA XT) 240 MG 24 hr capsule TAKE 1 CAPSULE BY MOUTH EVERY DAY     furosemide (LASIX) 20 MG tablet Take 1 tablet (20 mg) by mouth 2 times daily     gabapentin (NEURONTIN) 100 MG capsule Take 2 capsules (200 mg) by mouth At Bedtime     glipiZIDE (GLUCOTROL) 5 MG tablet TAKE 2 TABLETS BY MOUTH TWICE A DAY WITH MEALS     INVOKANA  "100 MG tablet TAKE 1 TABLET (100 MG) BY MOUTH EVERY MORNING (BEFORE BREAKFAST)     Lidocaine (LIDOCARE) 4 % Patch Place 1 patch onto the skin every 24 hours To prevent lidocaine toxicity, patient should be patch free for 12 hrs daily. Apply patch(s) to back}. Patches may be cut to smaller size prior to removing release liner.  Reminder: Remove previous patch before applying new patch.  NEVER APPLY HEAT OVER PATCH which increases absorption and may lead to local anesthetic toxicity. Do not apply over area where liposomal bupivacaine was injected for 96 hours post injection.     lisinopril (ZESTRIL) 40 MG tablet TAKE 1 TABLET BY MOUTH EVERY DAY     melatonin 1 MG TABS tablet Take 1 tablet (1 mg) by mouth At Bedtime     metFORMIN (GLUCOPHAGE XR) 500 MG 24 hr tablet TAKE 2 TABLETS BY MOUTH TWICE A DAY WITH FOOD     polyethylene glycol (MIRALAX) 17 GM/Dose powder Take 17 g by mouth daily     potassium chloride ER (K-TAB) 20 MEQ CR tablet Take 1 tablet (20 mEq) by mouth daily     senna-docusate (SENOKOT-S/PERICOLACE) 8.6-50 MG tablet Take 1 tablet by mouth 2 times daily     sodium chloride 1 GM tablet Take 2 tablets (2 g) by mouth 2 times daily (with meals)     traMADol (ULTRAM) 50 MG tablet Take 1 tablet (50 mg) by mouth 2 times daily as needed for severe pain     blood glucose (CONTOUR TEST) test strip Use to test blood sugar once daily or as directed.     generic lancets (FINGERSTIX LANCETS) [GENERIC LANCETS (FINGERSTIX LANCETS)] Check blood sugar twice daily.  Uses Insulin.  Diagnosis E11.9     Microlet Lancets MISC 100 each daily Use to test blood sugar once daily.     pen needle, diabetic 32 gauge x 1/4\" Ndle [PEN NEEDLE, DIABETIC 32 GAUGE X 1/4\" NDLE] Use as needed with insulin     No current facility-administered medications for this visit.       ROS:  4 point ROS including Respiratory, CV, GI and , other than that noted in the HPI,  is negative    Vitals:  /71   Pulse 67   Temp 98.1  F (36.7  C)   Resp " "18   Ht 1.651 m (5' 5\")   Wt 77.6 kg (171 lb)   SpO2 94%   BMI 28.46 kg/m    Exam:  GENERAL APPEARANCE:  Alert, in no distress  ENT:  Mouth and posterior oropharynx normal, moist mucous membranes  EYES:  EOM normal, conjunctiva and lids normal  RESP:  no respiratory distress  CV:  no edema  PSYCH:  insight and judgement impaired, memory impaired     Lab/Diagnostic data:  Recent labs in [facility] reviewed by me today.     ASSESSMENT/PLAN:  (R53.81) Physical deconditioning  (primary encounter diagnosis)  Comment: Due to acute illness  Plan: PT/OT eval and treat, discharge planning per their recommendations.    (M54.16) Lumbar back pain with radiculopathy affecting lower extremity  Comment: The back pain seems to be acute per chart review. The leg pain could be a combination of radiculopathy and neuropathy. Will stop lidocaine patch as this is not effective. Will try diclofenac gel. Hopefully pain will improve over time and with strengthening  Plan: Discontinue lidocaine patch. Diclofenac to low back TID. PT/OT. Continue tramadol prn for now. Monitor back pain    (E87.1) Hyponatremia  (E22.2) SIADH (syndrome of inappropriate ADH production) (H)  Comment: Improving at discharge, but sodium still low  Plan: [facility] 5/22/23. F/u nephrology    (E11.65) Type 2 diabetes mellitus with hyperglycemia, without long-term current use of insulin (H)  Comment: Several BG 200s since admission, but adequate control for age per A1c  Plan: Continue current POC with no changes at this time and adjustments as needed.    (I10) Essential hypertension  Comment: Chronic, controlled  Plan: Continue current POC with no changes at this time and adjustments as needed.      Orders:  Discontinue lidocaine patch  Diclofenac gel 2g to low back TID  BMP 5/22/23    Total time spent with patient visit at the skilled nursing facility was 45 min including patient visit and review of past records.     Electronically signed by:  BERTO Witt CNP "

## 2023-05-22 ENCOUNTER — LAB REQUISITION (OUTPATIENT)
Dept: LAB | Facility: CLINIC | Age: 78
End: 2023-05-22
Payer: MEDICARE

## 2023-05-22 DIAGNOSIS — E87.1 HYPO-OSMOLALITY AND HYPONATREMIA: ICD-10-CM

## 2023-05-23 ENCOUNTER — TRANSITIONAL CARE UNIT VISIT (OUTPATIENT)
Dept: GERIATRICS | Facility: CLINIC | Age: 78
End: 2023-05-23
Payer: MEDICARE

## 2023-05-23 VITALS
SYSTOLIC BLOOD PRESSURE: 126 MMHG | TEMPERATURE: 98 F | HEIGHT: 65 IN | RESPIRATION RATE: 17 BRPM | DIASTOLIC BLOOD PRESSURE: 81 MMHG | HEART RATE: 64 BPM | BODY MASS INDEX: 27.72 KG/M2 | WEIGHT: 166.4 LBS | OXYGEN SATURATION: 94 %

## 2023-05-23 DIAGNOSIS — E11.65 TYPE 2 DIABETES MELLITUS WITH HYPERGLYCEMIA, WITHOUT LONG-TERM CURRENT USE OF INSULIN (H): ICD-10-CM

## 2023-05-23 DIAGNOSIS — G62.9 PERIPHERAL POLYNEUROPATHY: ICD-10-CM

## 2023-05-23 DIAGNOSIS — I10 ESSENTIAL HYPERTENSION: ICD-10-CM

## 2023-05-23 DIAGNOSIS — R53.81 PHYSICAL DECONDITIONING: Primary | ICD-10-CM

## 2023-05-23 DIAGNOSIS — E22.2 SIADH (SYNDROME OF INAPPROPRIATE ADH PRODUCTION) (H): ICD-10-CM

## 2023-05-23 DIAGNOSIS — E87.1 HYPONATREMIA: ICD-10-CM

## 2023-05-23 DIAGNOSIS — M54.50 ACUTE MIDLINE LOW BACK PAIN WITHOUT SCIATICA: ICD-10-CM

## 2023-05-23 LAB
ANION GAP SERPL CALCULATED.3IONS-SCNC: 14 MMOL/L (ref 7–15)
BUN SERPL-MCNC: 31 MG/DL (ref 8–23)
CALCIUM SERPL-MCNC: 9.9 MG/DL (ref 8.8–10.2)
CHLORIDE SERPL-SCNC: 102 MMOL/L (ref 98–107)
CREAT SERPL-MCNC: 0.97 MG/DL (ref 0.67–1.17)
DEPRECATED HCO3 PLAS-SCNC: 24 MMOL/L (ref 22–29)
GFR SERPL CREATININE-BSD FRML MDRD: 81 ML/MIN/1.73M2
GLUCOSE SERPL-MCNC: 126 MG/DL (ref 70–99)
POTASSIUM SERPL-SCNC: 4 MMOL/L (ref 3.4–5.3)
SODIUM SERPL-SCNC: 140 MMOL/L (ref 136–145)

## 2023-05-23 PROCEDURE — 82310 ASSAY OF CALCIUM: CPT | Performed by: FAMILY MEDICINE

## 2023-05-23 PROCEDURE — 36415 COLL VENOUS BLD VENIPUNCTURE: CPT | Performed by: FAMILY MEDICINE

## 2023-05-23 PROCEDURE — P9604 ONE-WAY ALLOW PRORATED TRIP: HCPCS | Performed by: FAMILY MEDICINE

## 2023-05-23 PROCEDURE — 99309 SBSQ NF CARE MODERATE MDM 30: CPT | Performed by: NURSE PRACTITIONER

## 2023-05-23 NOTE — PROGRESS NOTES
"[facility] GERIATRICS    Chief Complaint   Patient presents with     RECHECK     HPI:  Vashti King is a 77 year old  (1945), who is being seen today for an episodic care visit at: [facility] ([facility]) [[id number]]. [facility]. Hospital stay 5/9/23 through 5/16/23. Patient with PMH HTN, DM2, BPH presented to the ED with N/V. He was seen in the ED a few days prior for burning sensation of hands and feet and was started on gabapentin. He was found to have sodium level of 120 on admission. Hydrochlorothiazide was stopped. Sodium dropped as low as 111. Currently on sodium tabs, fluid restriction and lasix. Gabapentin reduced. Due to uncontrolled back pain, started on Tylenol and lidocaine patches. Tramadol prn ordered for severe pain.     Today's concern is:   Patient is again seen with son who translates for the visit and provides some HPI. They do say that his low back pain is getting better. He has been doing whatever therapy asks. He asks again about the numbness in his hands. Provider advises him again that the gabapentin will help with pain, but the numbness cannot be treated. The goal with PT/OT is to improve his strength and learn ways to adapt to the numbness. BG continue to vary from 140-250s.   -120s/60-70s  HR 60-70s    Allergies, and PMH/PSH reviewed in EPIC today.  REVIEW OF SYSTEMS:  10 point ROS of systems including Constitutional, Eyes, Respiratory, Cardiovascular, Gastroenterology, Genitourinary, Integumentary, Musculoskeletal, Psychiatric were all negative except for pertinent positives noted in my HPI.    Objective:   /81   Pulse 64   Temp 98  F (36.7  C)   Resp 17   Ht 1.651 m (5' 5\")   Wt 75.5 kg (166 lb 6.4 oz)   SpO2 94%   BMI 27.69 kg/m    GENERAL APPEARANCE:  Alert, in no distress  ENT:  Mouth and posterior oropharynx normal, moist mucous membranes  EYES:  EOM normal, conjunctiva and lids normal  RESP:  no respiratory distress  PSYCH:  " memory impaired , affect abnormal flat      Most Recent 3 BMP's:Recent Labs   Lab Test 05/23/23  0533 05/16/23  1159 05/16/23  0738 05/16/23  0644 05/15/23  0738 05/15/23  0513     --   --  132*  --  129*  129*   POTASSIUM 4.0  --   --  3.3*  --  3.2*   CHLORIDE 102  --   --  96*  --  95*   CO2 24  --   --  28  --  27   BUN 31.0*  --   --  16.8  --  17.8   CR 0.97  --   --  0.69  --  0.77   ANIONGAP 14  --   --  8  --  7   HELEN 9.9  --   --  8.5*  --  8.4*   * 172* 94 85   < > 124*    < > = values in this interval not displayed.       Assessment/Plan:  (R53.81) Physical deconditioning  (primary encounter diagnosis)  Comment: Improving  Plan: PT/OT eval and treat, discharge planning per their recommendations.    (M54.50) Acute midline low back pain without sciatica  Comment: This appeared to be acute while in the hospital, unclear reasons. It is expected to improve with therapy and time  Plan: Continue current POC with no changes at this time and adjustments as needed.    (G62.9) Peripheral polyneuropathy  Comment: Due to diabetes. He does not seem to be having pain with this, just numbness  Plan: Continue current POC with no changes at this time and adjustments as needed.    (E87.1) Hyponatremia  (E22.2) SIADH (syndrome of inappropriate ADH production) (H)  Comment: Labs are consistent with him getting too dry now. His nephrology appointment is scheduled for 6/13. Will stop fluid restriction for now and recheck labs next week. If labs worsen, will reduce lasix and salt tabs as well.   Plan: Discontinue fluid restriction. BMP 5/31/23    (E11.65) Type 2 diabetes mellitus with hyperglycemia, without long-term current use of insulin (H)  Comment: Fair control. He may need to go on insulin at some point, or GLP1 if covered by insurance, but will defer to PCP  Plan: Continue current POC with no changes at this time and adjustments as needed.    (I10) Essential hypertension  Comment: Chronic, controlled  Plan:  Continue current POC with no changes at this time and adjustments as needed.        Orders:  Discontinue fluid restriction  BMP 5/31/23      Electronically signed by: BERTO Witt CNP

## 2023-05-23 NOTE — LETTER
"    5/23/2023        RE: Vashti King  [address]  [place] [address]        [facility] GERIATRICS    Chief Complaint   Patient presents with     RECHECK     HPI:  Vashti King is a 77 year old  (1945), who is being seen today for an episodic care visit at: [facility] ([facility]) [[id number]]. [facility]. Hospital stay 5/9/23 through 5/16/23. Patient with PMH HTN, DM2, BPH presented to the ED with N/V. He was seen in the ED a few days prior for burning sensation of hands and feet and was started on gabapentin. He was found to have sodium level of 120 on admission. Hydrochlorothiazide was stopped. Sodium dropped as low as 111. Currently on sodium tabs, fluid restriction and lasix. Gabapentin reduced. Due to uncontrolled back pain, started on Tylenol and lidocaine patches. Tramadol prn ordered for severe pain.     Today's concern is:   Patient is again seen with son who translates for the visit and provides some HPI. They do say that his low back pain is getting better. He has been doing whatever therapy asks. He asks again about the numbness in his hands. Provider advises him again that the gabapentin will help with pain, but the numbness cannot be treated. The goal with PT/OT is to improve his strength and learn ways to adapt to the numbness. BG continue to vary from 140-250s.   -120s/60-70s  HR 60-70s    Allergies, and PMH/PSH reviewed in EPIC today.  REVIEW OF SYSTEMS:  10 point ROS of systems including Constitutional, Eyes, Respiratory, Cardiovascular, Gastroenterology, Genitourinary, Integumentary, Musculoskeletal, Psychiatric were all negative except for pertinent positives noted in my HPI.    Objective:   /81   Pulse 64   Temp 98  F (36.7  C)   Resp 17   Ht 1.651 m (5' 5\")   Wt 75.5 kg (166 lb 6.4 oz)   SpO2 94%   BMI 27.69 kg/m    GENERAL APPEARANCE:  Alert, in no distress  ENT:  Mouth and posterior oropharynx normal, moist mucous membranes  EYES:  " EOM normal, conjunctiva and lids normal  RESP:  no respiratory distress  PSYCH:  memory impaired , affect abnormal flat      Most Recent 3 BMP's:Recent Labs   Lab Test 05/23/23  0533 05/16/23  1159 05/16/23  0738 05/16/23  0644 05/15/23  0738 05/15/23  0513     --   --  132*  --  129*  129*   POTASSIUM 4.0  --   --  3.3*  --  3.2*   CHLORIDE 102  --   --  96*  --  95*   CO2 24  --   --  28  --  27   BUN 31.0*  --   --  16.8  --  17.8   CR 0.97  --   --  0.69  --  0.77   ANIONGAP 14  --   --  8  --  7   HELEN 9.9  --   --  8.5*  --  8.4*   * 172* 94 85   < > 124*    < > = values in this interval not displayed.       Assessment/Plan:  (R53.81) Physical deconditioning  (primary encounter diagnosis)  Comment: Improving  Plan: PT/OT eval and treat, discharge planning per their recommendations.    (M54.50) Acute midline low back pain without sciatica  Comment: This appeared to be acute while in the hospital, unclear reasons. It is expected to improve with therapy and time  Plan: Continue current POC with no changes at this time and adjustments as needed.    (G62.9) Peripheral polyneuropathy  Comment: Due to diabetes. He does not seem to be having pain with this, just numbness  Plan: Continue current POC with no changes at this time and adjustments as needed.    (E87.1) Hyponatremia  (E22.2) SIADH (syndrome of inappropriate ADH production) (H)  Comment: Labs are consistent with him getting too dry now. His nephrology appointment is scheduled for 6/13. Will stop fluid restriction for now and recheck labs next week. If labs worsen, will reduce lasix and salt tabs as well.   Plan: Discontinue fluid restriction. BMP 5/31/23    (E11.65) Type 2 diabetes mellitus with hyperglycemia, without long-term current use of insulin (H)  Comment: Fair control. He may need to go on insulin at some point, or GLP1 if covered by insurance, but will defer to PCP  Plan: Continue current POC with no changes at this time and  adjustments as needed.    (I10) Essential hypertension  Comment: Chronic, controlled  Plan: Continue current POC with no changes at this time and adjustments as needed.        Orders:  Discontinue fluid restriction  BMP 5/31/23      Electronically signed by: BERTO Witt CNP             Sincerely,        BERTO Witt CNP

## 2023-05-24 ENCOUNTER — TELEPHONE (OUTPATIENT)
Dept: GERIATRICS | Facility: CLINIC | Age: 78
End: 2023-05-24
Payer: MEDICARE

## 2023-05-25 NOTE — TELEPHONE ENCOUNTER
Page RE: new complaint of bilateral hand numbness, VS WNL, 98.2, 121/64, 94%, , patient wanted to update status, difficulty making a fist and fine motor coordination, considering benign at this time.  -staff to monitor status

## 2023-05-26 ENCOUNTER — PATIENT OUTREACH (OUTPATIENT)
Dept: CARE COORDINATION | Facility: CLINIC | Age: 78
End: 2023-05-26
Payer: MEDICARE

## 2023-05-26 NOTE — PROGRESS NOTES
Clinic Care Coordination Contact  Care Coordination Transition Communication    CC SANDRA called and connected with [facility] Staff member April. April reported that pt has been progressing. His weakness has reduced as his strength has increased. Currently pt's on a fluid restriction and there has been some fluctuations with his blood sugar. April reports that pt has been following with therapies and care plan and has had a lot of support from his family as well.      Clinical Data: Patient was hospitalized at [facility] from 5/9/23 to 5/16/23 with diagnosis of     Discharge Diagnoses   Hyponatremia  SIADH  Hypokalemia  Diabetic polyneuropathy  Essential hypertension  Type 2 diabetes  CKD 3a  Hyperlipidemia  Back pain     Transition to Facility:              Facility Name: [facility]              Contact name and phone number/fax: [phone]    Plan: RN/SW Care Coordinator will await notification from facility staff informing RN/SW Care Coordinator of patient's discharge plans/needs. RN/SW Care Coordinator will review chart and outreach to facility staff every 4 weeks and as needed.     ANILA Diaz   Social Work Care Coordinator   [facility]    [phone]

## 2023-05-30 ENCOUNTER — LAB REQUISITION (OUTPATIENT)
Dept: LAB | Facility: CLINIC | Age: 78
End: 2023-05-30
Payer: MEDICARE

## 2023-05-30 ENCOUNTER — TRANSITIONAL CARE UNIT VISIT (OUTPATIENT)
Dept: GERIATRICS | Facility: CLINIC | Age: 78
End: 2023-05-30
Payer: MEDICARE

## 2023-05-30 VITALS
WEIGHT: 166.4 LBS | RESPIRATION RATE: 18 BRPM | BODY MASS INDEX: 27.72 KG/M2 | OXYGEN SATURATION: 97 % | HEIGHT: 65 IN | SYSTOLIC BLOOD PRESSURE: 97 MMHG | DIASTOLIC BLOOD PRESSURE: 67 MMHG | HEART RATE: 79 BPM | TEMPERATURE: 96.3 F

## 2023-05-30 DIAGNOSIS — E11.65 TYPE 2 DIABETES MELLITUS WITH HYPERGLYCEMIA, WITHOUT LONG-TERM CURRENT USE OF INSULIN (H): ICD-10-CM

## 2023-05-30 DIAGNOSIS — E87.1 HYPONATREMIA: ICD-10-CM

## 2023-05-30 DIAGNOSIS — G63 POLYNEUROPATHY ASSOCIATED WITH UNDERLYING DISEASE (H): ICD-10-CM

## 2023-05-30 DIAGNOSIS — E87.1 HYPO-OSMOLALITY AND HYPONATREMIA: ICD-10-CM

## 2023-05-30 DIAGNOSIS — I10 ESSENTIAL HYPERTENSION: ICD-10-CM

## 2023-05-30 DIAGNOSIS — R53.81 PHYSICAL DECONDITIONING: Primary | ICD-10-CM

## 2023-05-30 DIAGNOSIS — E22.2 SIADH (SYNDROME OF INAPPROPRIATE ADH PRODUCTION) (H): ICD-10-CM

## 2023-05-30 PROCEDURE — 99309 SBSQ NF CARE MODERATE MDM 30: CPT | Performed by: NURSE PRACTITIONER

## 2023-05-30 NOTE — LETTER
"    5/30/2023        RE: Vashti King  [address]  [place] [address]        [facility] GERIATRICS    Chief Complaint   Patient presents with     RECHECK     HPI:  Vashti King is a 77 year old  (1945), who is being seen today for an episodic care visit at: [facility] ([facility]) [[id number]]. [facility]. Hospital stay 5/9/23 through 5/16/23. Patient with PMH HTN, DM2, BPH presented to the ED with N/V. He was seen in the ED a few days prior for burning sensation of hands and feet and was started on gabapentin. He was found to have sodium level of 120 on admission. Hydrochlorothiazide was stopped. Sodium dropped as low as 111. Currently on sodium tabs, fluid restriction and lasix. Gabapentin reduced. Due to uncontrolled back pain, started on Tylenol and lidocaine patches. Tramadol prn ordered for severe pain.     Today's concern is:   Patient's son is not present today, telephone  used. His wife is here today. Patient again asks about his numb hands and feet. He says that he can't walk because of it. Provider again educates him and his wife that this is neuropathy due to diabetes and it is not reversible. The goal is to gain strength with PT/OT and some strategies for safe mobility. It is unknown how far or well he will be able to walk. His low back is still causing him pain, but he does think this is a little better. It is not getting worse. His wife asks about his blood pressure, wondering if medications can be stopped or decreased. His BP was low this morning 97/67, but otherwise has been 120-130s/60-70s.     Allergies, and PMH/PSH reviewed in EPIC today.  REVIEW OF SYSTEMS:  4 point ROS including Respiratory, CV, GI and , other than that noted in the HPI,  is negative    Objective:   BP 97/67   Pulse 79   Temp (!) 96.3  F (35.7  C)   Resp 18   Ht 1.651 m (5' 5\")   Wt 75.5 kg (166 lb 6.4 oz)   SpO2 97%   BMI 27.69 kg/m    GENERAL APPEARANCE:  Alert, in " no distress  ENT:  Mouth and posterior oropharynx normal, moist mucous membranes  EYES:  EOM normal, conjunctiva and lids normal  RESP:  no respiratory distress  CV:  peripheral edema 2+ in feet  PSYCH:  insight and judgement impaired, memory impaired , affect abnormal flat    Recent labs in [facility] reviewed by me today.     Assessment/Plan:  (R53.81) Physical deconditioning  (primary encounter diagnosis)  Comment: Some improvement. There was no therapy over the holiday weekend, so it is difficult to determine his status now  Plan: PT/OT eval and treat, discharge planning per their recommendations.    (G63) Polyneuropathy associated with underlying disease (H)  Comment: Due to diabetes. Unfortunately this will continue to be debilitating for him, but hopefully he can adapt. Recommend he see neurology as well  Plan: Continue current POC with no changes at this time and adjustments as needed.    (E11.65) Type 2 diabetes mellitus with hyperglycemia, without long-term current use of insulin (H)  Comment: Fair control. He may need to go on insulin at some point, or GLP1 if covered by insurance, but will defer to PCP  Plan: Continue current POC with no changes at this time and adjustments as needed.    (E87.1) Hyponatremia  (E22.2) SIADH (syndrome of inappropriate ADH production) (H)  Comment: Sodium was 140 last week, labs consistent with him being a little dry. Order was given to stop fluid restriction, but this was not transcribed. They will stop this today.   Plan: [facility] 5/31/23    (I10) Essential hypertension  Comment: Low BP this morning may be related to dehydration. Otherwise has been controlled. Will see how his BP does when the fluid restriction stops  Plan: Continue current POC with no changes at this time and adjustments as needed.        Electronically signed by: BERTO Witt CNP           Sincerely,        BERTO Witt CNP

## 2023-05-30 NOTE — PROGRESS NOTES
"[facility] GERIATRICS    Chief Complaint   Patient presents with     RECHECK     HPI:  Vashti King is a 77 year old  (1945), who is being seen today for an episodic care visit at: [facility] ([facility]) [[id number]]. [facility]. Hospital stay 5/9/23 through 5/16/23. Patient with PMH HTN, DM2, BPH presented to the ED with N/V. He was seen in the ED a few days prior for burning sensation of hands and feet and was started on gabapentin. He was found to have sodium level of 120 on admission. Hydrochlorothiazide was stopped. Sodium dropped as low as 111. Currently on sodium tabs, fluid restriction and lasix. Gabapentin reduced. Due to uncontrolled back pain, started on Tylenol and lidocaine patches. Tramadol prn ordered for severe pain.     Today's concern is:   Patient's son is not present today, telephone  used. His wife is here today. Patient again asks about his numb hands and feet. He says that he can't walk because of it. Provider again educates him and his wife that this is neuropathy due to diabetes and it is not reversible. The goal is to gain strength with PT/OT and some strategies for safe mobility. It is unknown how far or well he will be able to walk. His low back is still causing him pain, but he does think this is a little better. It is not getting worse. His wife asks about his blood pressure, wondering if medications can be stopped or decreased. His BP was low this morning 97/67, but otherwise has been 120-130s/60-70s.     Allergies, and PMH/PSH reviewed in EPIC today.  REVIEW OF SYSTEMS:  4 point ROS including Respiratory, CV, GI and , other than that noted in the HPI,  is negative    Objective:   BP 97/67   Pulse 79   Temp (!) 96.3  F (35.7  C)   Resp 18   Ht 1.651 m (5' 5\")   Wt 75.5 kg (166 lb 6.4 oz)   SpO2 97%   BMI 27.69 kg/m    GENERAL APPEARANCE:  Alert, in no distress  ENT:  Mouth and posterior oropharynx normal, moist mucous " membranes  EYES:  EOM normal, conjunctiva and lids normal  RESP:  no respiratory distress  CV:  peripheral edema 2+ in feet  PSYCH:  insight and judgement impaired, memory impaired , affect abnormal flat    Recent labs in [facility] reviewed by me today.     Assessment/Plan:  (R53.81) Physical deconditioning  (primary encounter diagnosis)  Comment: Some improvement. There was no therapy over the holiday weekend, so it is difficult to determine his status now  Plan: PT/OT eval and treat, discharge planning per their recommendations.    (G63) Polyneuropathy associated with underlying disease (H)  Comment: Due to diabetes. Unfortunately this will continue to be debilitating for him, but hopefully he can adapt. Recommend he see neurology as well  Plan: Continue current POC with no changes at this time and adjustments as needed.    (E11.65) Type 2 diabetes mellitus with hyperglycemia, without long-term current use of insulin (H)  Comment: Fair control. He may need to go on insulin at some point, or GLP1 if covered by insurance, but will defer to PCP  Plan: Continue current POC with no changes at this time and adjustments as needed.    (E87.1) Hyponatremia  (E22.2) SIADH (syndrome of inappropriate ADH production) (H)  Comment: Sodium was 140 last week, labs consistent with him being a little dry. Order was given to stop fluid restriction, but this was not transcribed. They will stop this today.   Plan: [place] [facility] 5/31/23    (I10) Essential hypertension  Comment: Low BP this morning may be related to dehydration. Otherwise has been controlled. Will see how his BP does when the fluid restriction stops  Plan: Continue current POC with no changes at this time and adjustments as needed.        Electronically signed by: BERTO Witt CNP

## 2023-05-31 LAB
ANION GAP SERPL CALCULATED.3IONS-SCNC: 14 MMOL/L (ref 7–15)
BUN SERPL-MCNC: 36 MG/DL (ref 8–23)
CALCIUM SERPL-MCNC: 9.5 MG/DL (ref 8.8–10.2)
CHLORIDE SERPL-SCNC: 106 MMOL/L (ref 98–107)
CREAT SERPL-MCNC: 0.96 MG/DL (ref 0.67–1.17)
DEPRECATED HCO3 PLAS-SCNC: 19 MMOL/L (ref 22–29)
GFR SERPL CREATININE-BSD FRML MDRD: 82 ML/MIN/1.73M2
GLUCOSE SERPL-MCNC: 45 MG/DL (ref 70–99)
POTASSIUM SERPL-SCNC: 3.7 MMOL/L (ref 3.4–5.3)
SODIUM SERPL-SCNC: 139 MMOL/L (ref 136–145)

## 2023-05-31 PROCEDURE — 80048 BASIC METABOLIC PNL TOTAL CA: CPT | Performed by: FAMILY MEDICINE

## 2023-05-31 PROCEDURE — P9603 ONE-WAY ALLOW PRORATED MILES: HCPCS | Performed by: FAMILY MEDICINE

## 2023-05-31 PROCEDURE — 36415 COLL VENOUS BLD VENIPUNCTURE: CPT | Performed by: FAMILY MEDICINE

## 2023-06-02 ENCOUNTER — LAB REQUISITION (OUTPATIENT)
Dept: LAB | Facility: CLINIC | Age: 78
End: 2023-06-02
Payer: MEDICARE

## 2023-06-02 ENCOUNTER — TRANSITIONAL CARE UNIT VISIT (OUTPATIENT)
Dept: GERIATRICS | Facility: CLINIC | Age: 78
End: 2023-06-02
Payer: MEDICARE

## 2023-06-02 VITALS
SYSTOLIC BLOOD PRESSURE: 132 MMHG | RESPIRATION RATE: 17 BRPM | TEMPERATURE: 97.5 F | DIASTOLIC BLOOD PRESSURE: 82 MMHG | WEIGHT: 164.4 LBS | HEART RATE: 63 BPM | HEIGHT: 65 IN | OXYGEN SATURATION: 96 % | BODY MASS INDEX: 27.39 KG/M2

## 2023-06-02 DIAGNOSIS — E11.9 TYPE 2 DIABETES MELLITUS WITHOUT COMPLICATIONS (H): ICD-10-CM

## 2023-06-02 DIAGNOSIS — R80.9 TYPE 2 DIABETES MELLITUS WITH MICROALBUMINURIA, WITHOUT LONG-TERM CURRENT USE OF INSULIN (H): ICD-10-CM

## 2023-06-02 DIAGNOSIS — E11.649 TYPE 2 DIABETES MELLITUS WITH HYPOGLYCEMIA WITHOUT COMA, WITHOUT LONG-TERM CURRENT USE OF INSULIN (H): Primary | ICD-10-CM

## 2023-06-02 DIAGNOSIS — E11.29 TYPE 2 DIABETES MELLITUS WITH MICROALBUMINURIA, WITHOUT LONG-TERM CURRENT USE OF INSULIN (H): ICD-10-CM

## 2023-06-02 PROCEDURE — 99308 SBSQ NF CARE LOW MDM 20: CPT | Performed by: NURSE PRACTITIONER

## 2023-06-02 RX ORDER — GLIPIZIDE 5 MG/1
10 TABLET ORAL DAILY
Qty: 360 TABLET | Refills: 0
Start: 2023-06-02 | End: 2023-09-12

## 2023-06-02 NOTE — PROGRESS NOTES
"[facility] GERIATRICS    Chief Complaint   Patient presents with     Diabetes     HPI:  Vashti King is a 77 year old  (1945), who is being seen today for an episodic care visit at: [facility] ([facility]) [[id number]]. [facility]. Hospital stay 5/9/23 through 5/16/23. Patient with PMH HTN, DM2, BPH presented to the ED with N/V. He was seen in the ED a few days prior for burning sensation of hands and feet and was started on gabapentin. He was found to have sodium level of 120 on admission. Hydrochlorothiazide was stopped. Sodium dropped as low as 111. Currently on sodium tabs, fluid restriction and lasix. Gabapentin reduced. Due to uncontrolled back pain, started on Tylenol and lidocaine patches. Tramadol prn ordered for severe pain.     Today's concern is:   For the past 3 mornings, fasting sugars 70s. Staff report that he has been eating well, family is bringing in food. Later in the day, -200s.    Allergies, and PMH/PSH reviewed in EPIC today.  REVIEW OF SYSTEMS:  4 point ROS including Respiratory, CV, GI and , other than that noted in the HPI,  is negative    Objective:   /82   Pulse 63   Temp 97.5  F (36.4  C)   Resp 17   Ht 1.651 m (5' 5\")   Wt 74.6 kg (164 lb 6.4 oz)   SpO2 96%   BMI 27.36 kg/m    GENERAL APPEARANCE:  Alert, in no distress  RESP:  no respiratory distress    Recent labs in EPIC reviewed by me today.     Assessment/Plan:  (E11.649) Type 2 diabetes mellitus with hypoglycemia without coma, without long-term current use of insulin (H)  (primary encounter diagnosis)  Comment: Due to consistently low fasting sugars, will stop evening dose of glipizide, which is the most likely to cause this.       Orders:  Change glipizide to 10mg qam      Electronically signed by: BERTO Witt CNP       "

## 2023-06-02 NOTE — LETTER
"    6/2/2023        RE: Vashti King  [address]  [place] [address]        [facility] GERIATRICS    Chief Complaint   Patient presents with     Diabetes     HPI:  Vashti King is a 77 year old  (1945), who is being seen today for an episodic care visit at: [facility] ([facility]) [[id number]]. [facility]. Hospital stay 5/9/23 through 5/16/23. Patient with PMH HTN, DM2, BPH presented to the ED with N/V. He was seen in the ED a few days prior for burning sensation of hands and feet and was started on gabapentin. He was found to have sodium level of 120 on admission. Hydrochlorothiazide was stopped. Sodium dropped as low as 111. Currently on sodium tabs, fluid restriction and lasix. Gabapentin reduced. Due to uncontrolled back pain, started on Tylenol and lidocaine patches. Tramadol prn ordered for severe pain.     Today's concern is:   For the past 3 mornings, fasting sugars 70s. Staff report that he has been eating well, family is bringing in food. Later in the day, -200s.    Allergies, and PMH/PSH reviewed in EPIC today.  REVIEW OF SYSTEMS:  4 point ROS including Respiratory, CV, GI and , other than that noted in the HPI,  is negative    Objective:   /82   Pulse 63   Temp 97.5  F (36.4  C)   Resp 17   Ht 1.651 m (5' 5\")   Wt 74.6 kg (164 lb 6.4 oz)   SpO2 96%   BMI 27.36 kg/m    GENERAL APPEARANCE:  Alert, in no distress  RESP:  no respiratory distress    Recent labs in EPIC reviewed by me today.     Assessment/Plan:  (E11.649) Type 2 diabetes mellitus with hypoglycemia without coma, without long-term current use of insulin (H)  (primary encounter diagnosis)  Comment: Due to consistently low fasting sugars, will stop evening dose of glipizide, which is the most likely to cause this.       Orders:  Change glipizide to 10mg qam      Electronically signed by: Deisy Allison, BERTO CNP             Sincerely,        BERTO Witt CNP      "

## 2023-06-05 LAB
ANION GAP SERPL CALCULATED.3IONS-SCNC: 15 MMOL/L (ref 7–15)
BUN SERPL-MCNC: 25.4 MG/DL (ref 8–23)
CALCIUM SERPL-MCNC: 9.4 MG/DL (ref 8.8–10.2)
CHLORIDE SERPL-SCNC: 103 MMOL/L (ref 98–107)
CREAT SERPL-MCNC: 0.89 MG/DL (ref 0.67–1.17)
DEPRECATED HCO3 PLAS-SCNC: 22 MMOL/L (ref 22–29)
GFR SERPL CREATININE-BSD FRML MDRD: 89 ML/MIN/1.73M2
GLUCOSE SERPL-MCNC: 96 MG/DL (ref 70–99)
POTASSIUM SERPL-SCNC: 3.4 MMOL/L (ref 3.4–5.3)
SODIUM SERPL-SCNC: 140 MMOL/L (ref 136–145)

## 2023-06-05 PROCEDURE — P9604 ONE-WAY ALLOW PRORATED TRIP: HCPCS | Performed by: NURSE PRACTITIONER

## 2023-06-05 PROCEDURE — 80048 BASIC METABOLIC PNL TOTAL CA: CPT | Performed by: NURSE PRACTITIONER

## 2023-06-05 PROCEDURE — 36415 COLL VENOUS BLD VENIPUNCTURE: CPT | Performed by: NURSE PRACTITIONER

## 2023-06-06 ENCOUNTER — TRANSITIONAL CARE UNIT VISIT (OUTPATIENT)
Dept: GERIATRICS | Facility: CLINIC | Age: 78
End: 2023-06-06
Payer: MEDICARE

## 2023-06-06 VITALS
OXYGEN SATURATION: 97 % | WEIGHT: 165 LBS | SYSTOLIC BLOOD PRESSURE: 125 MMHG | DIASTOLIC BLOOD PRESSURE: 83 MMHG | TEMPERATURE: 97.9 F | RESPIRATION RATE: 18 BRPM | HEART RATE: 71 BPM | HEIGHT: 65 IN | BODY MASS INDEX: 27.49 KG/M2

## 2023-06-06 DIAGNOSIS — E22.2 SIADH (SYNDROME OF INAPPROPRIATE ADH PRODUCTION) (H): ICD-10-CM

## 2023-06-06 DIAGNOSIS — E87.1 HYPONATREMIA: Primary | ICD-10-CM

## 2023-06-06 DIAGNOSIS — M54.50 ACUTE MIDLINE LOW BACK PAIN WITHOUT SCIATICA: ICD-10-CM

## 2023-06-06 DIAGNOSIS — I10 ESSENTIAL HYPERTENSION: ICD-10-CM

## 2023-06-06 DIAGNOSIS — G62.9 POLYNEUROPATHY: ICD-10-CM

## 2023-06-06 DIAGNOSIS — E11.649 TYPE 2 DIABETES MELLITUS WITH HYPOGLYCEMIA WITHOUT COMA, WITHOUT LONG-TERM CURRENT USE OF INSULIN (H): ICD-10-CM

## 2023-06-06 PROCEDURE — 99309 SBSQ NF CARE MODERATE MDM 30: CPT | Performed by: NURSE PRACTITIONER

## 2023-06-06 RX ORDER — ACETAMINOPHEN 325 MG/1
975 TABLET ORAL AT BEDTIME
Qty: 180 TABLET | Refills: 0
Start: 2023-06-06 | End: 2023-08-16

## 2023-06-06 RX ORDER — FUROSEMIDE 20 MG
20 TABLET ORAL DAILY
Qty: 60 TABLET | Refills: 0
Start: 2023-06-06 | End: 2023-06-22

## 2023-06-06 RX ORDER — SODIUM CHLORIDE 1 G/1
2 TABLET ORAL DAILY
Qty: 60 TABLET | Refills: 0
Start: 2023-06-06 | End: 2023-07-12

## 2023-06-06 NOTE — PROGRESS NOTES
"[facility] GERIATRICS    Chief Complaint   Patient presents with     RECHECK     HPI:  Vashti King is a 77 year old  (1945), who is being seen today for an episodic care visit at: [facility] ([facility]) [[id number]]. [facility]. Hospital stay 5/9/23 through 5/16/23. Patient with PMH HTN, DM2, BPH presented to the ED with N/V. He was seen in the ED a few days prior for burning sensation of hands and feet and was started on gabapentin. He was found to have sodium level of 120 on admission. Hydrochlorothiazide was stopped. Sodium dropped as low as 111. Started on sodium tabs, fluid restriction and lasix. Gabapentin reduced. Due to uncontrolled back pain, started on Tylenol and lidocaine patches. Tramadol prn ordered for severe pain.     Today's concern is:   Hyponatremia  SIADH (syndrome of inappropriate ADH production) (H)  Fluid restriction stopped last week due to evidence of dehydration. Sodium remains stable at 140    Type 2 diabetes mellitus with hypoglycemia without coma, without long-term current use of insulin (H)  Glipizide decreased from BID to just qam due to fasting sugars 70s. -170s, occasional 200s    Acute midline low back pain without sciatica  Patient reports some improvement. He would like to have the Tylenol just at bedtime and prn during the day      Allergies, and PMH/PSH reviewed in [facility] today.  REVIEW OF SYSTEMS:  4 point ROS including Respiratory, CV, GI and , other than that noted in the HPI,  is negative    Objective:   /83   Pulse 71   Temp 97.9  F (36.6  C)   Resp 18   Ht 1.651 m (5' 5\")   Wt 74.8 kg (165 lb)   SpO2 97%   BMI 27.46 kg/m    GENERAL APPEARANCE:  Alert, in no distress  RESP:  no respiratory distress  PSYCH:  memory impaired , affect abnormal flat    Recent labs in [facility] reviewed by me today.     Assessment/Plan:  (E87.1) Hyponatremia  (primary encounter diagnosis)  (E22.2) SIADH (syndrome of inappropriate ADH production) " (H)  Comment: Na level stable off fluid restriction. He does not see nephrology for another week. It would be reasonable to try decrease lasix and NaCl now.   Plan: Decrease lasix and sodium chloride to daily. BMP 6/9/23    (E11.649) Type 2 diabetes mellitus with hypoglycemia without coma, without long-term current use of insulin (H)  Comment: Well controlled, no further hypoglycemia  Plan: Continue current POC with no changes at this time and adjustments as needed.    (M54.50) Acute midline low back pain without sciatica  Comment: Improving  Plan: Change Tylenol per patient request      Orders:  Decrease lasix to 20mg daily  Decrease NaCl to 2g daily  Change Tylenol to 975mg at bedtime and BID prn  BMP 6/9      Electronically signed by: BERTO Witt CNP

## 2023-06-06 NOTE — LETTER
"    6/6/2023        RE: Vashti King  [address]  [place] [address]        [facility] GERIATRICS    Chief Complaint   Patient presents with     RECHECK     HPI:  Vashti King is a 77 year old  (1945), who is being seen today for an episodic care visit at: [facility] ([facility]) [[id number]]. [facility]. Hospital stay 5/9/23 through 5/16/23. Patient with PMH HTN, DM2, BPH presented to the ED with N/V. He was seen in the ED a few days prior for burning sensation of hands and feet and was started on gabapentin. He was found to have sodium level of 120 on admission. Hydrochlorothiazide was stopped. Sodium dropped as low as 111. Started on sodium tabs, fluid restriction and lasix. Gabapentin reduced. Due to uncontrolled back pain, started on Tylenol and lidocaine patches. Tramadol prn ordered for severe pain.     Today's concern is:   Hyponatremia  SIADH (syndrome of inappropriate ADH production) (H)  Fluid restriction stopped last week due to evidence of dehydration. Sodium remains stable at 140    Type 2 diabetes mellitus with hypoglycemia without coma, without long-term current use of insulin (H)  Glipizide decreased from BID to just qam due to fasting sugars 70s. -170s, occasional 200s    Acute midline low back pain without sciatica  Patient reports some improvement. He would like to have the Tylenol just at bedtime and prn during the day      Allergies, and PMH/PSH reviewed in EPIC today.  REVIEW OF SYSTEMS:  4 point ROS including Respiratory, CV, GI and , other than that noted in the HPI,  is negative    Objective:   /83   Pulse 71   Temp 97.9  F (36.6  C)   Resp 18   Ht 1.651 m (5' 5\")   Wt 74.8 kg (165 lb)   SpO2 97%   BMI 27.46 kg/m    GENERAL APPEARANCE:  Alert, in no distress  RESP:  no respiratory distress  PSYCH:  memory impaired , affect abnormal flat    Recent labs in [facility] reviewed by me today.     Assessment/Plan:  (E87.1) Hyponatremia  " (primary encounter diagnosis)  (E22.2) SIADH (syndrome of inappropriate ADH production) (H)  Comment: Na level stable off fluid restriction. He does not see nephrology for another week. It would be reasonable to try decrease lasix and NaCl now.   Plan: Decrease lasix and sodium chloride to daily. BMP 6/9/23    (E11.649) Type 2 diabetes mellitus with hypoglycemia without coma, without long-term current use of insulin (H)  Comment: Well controlled, no further hypoglycemia  Plan: Continue current POC with no changes at this time and adjustments as needed.    (M54.50) Acute midline low back pain without sciatica  Comment: Improving  Plan: Change Tylenol per patient request      Orders:  Decrease lasix to 20mg daily  Decrease NaCl to 2g daily  Change Tylenol to 975mg at bedtime and BID prn  BMP 6/9      Electronically signed by: BERTO Witt CNP             Sincerely,        BERTO Witt CNP

## 2023-06-08 ENCOUNTER — LAB REQUISITION (OUTPATIENT)
Dept: LAB | Facility: CLINIC | Age: 78
End: 2023-06-08
Payer: MEDICARE

## 2023-06-08 DIAGNOSIS — E87.1 HYPO-OSMOLALITY AND HYPONATREMIA: ICD-10-CM

## 2023-06-09 ENCOUNTER — TRANSITIONAL CARE UNIT VISIT (OUTPATIENT)
Dept: GERIATRICS | Facility: CLINIC | Age: 78
End: 2023-06-09
Payer: MEDICARE

## 2023-06-09 VITALS
SYSTOLIC BLOOD PRESSURE: 133 MMHG | OXYGEN SATURATION: 97 % | WEIGHT: 165 LBS | HEIGHT: 65 IN | DIASTOLIC BLOOD PRESSURE: 66 MMHG | RESPIRATION RATE: 18 BRPM | BODY MASS INDEX: 27.49 KG/M2 | TEMPERATURE: 97.5 F | HEART RATE: 67 BPM

## 2023-06-09 DIAGNOSIS — M62.81 GENERALIZED MUSCLE WEAKNESS: ICD-10-CM

## 2023-06-09 DIAGNOSIS — M54.50 CHRONIC MIDLINE LOW BACK PAIN WITHOUT SCIATICA: Primary | ICD-10-CM

## 2023-06-09 DIAGNOSIS — G89.29 CHRONIC MIDLINE LOW BACK PAIN WITHOUT SCIATICA: Primary | ICD-10-CM

## 2023-06-09 LAB
ANION GAP SERPL CALCULATED.3IONS-SCNC: 15 MMOL/L (ref 7–15)
BUN SERPL-MCNC: 18.2 MG/DL (ref 8–23)
CALCIUM SERPL-MCNC: 9.9 MG/DL (ref 8.8–10.2)
CHLORIDE SERPL-SCNC: 103 MMOL/L (ref 98–107)
CREAT SERPL-MCNC: 0.86 MG/DL (ref 0.67–1.17)
DEPRECATED HCO3 PLAS-SCNC: 22 MMOL/L (ref 22–29)
GFR SERPL CREATININE-BSD FRML MDRD: 90 ML/MIN/1.73M2
GLUCOSE SERPL-MCNC: 105 MG/DL (ref 70–99)
POTASSIUM SERPL-SCNC: 4.1 MMOL/L (ref 3.4–5.3)
SODIUM SERPL-SCNC: 140 MMOL/L (ref 136–145)

## 2023-06-09 PROCEDURE — 80048 BASIC METABOLIC PNL TOTAL CA: CPT | Performed by: NURSE PRACTITIONER

## 2023-06-09 PROCEDURE — 99308 SBSQ NF CARE LOW MDM 20: CPT | Performed by: NURSE PRACTITIONER

## 2023-06-09 PROCEDURE — 36415 COLL VENOUS BLD VENIPUNCTURE: CPT | Performed by: NURSE PRACTITIONER

## 2023-06-09 PROCEDURE — P9603 ONE-WAY ALLOW PRORATED MILES: HCPCS | Performed by: NURSE PRACTITIONER

## 2023-06-09 NOTE — PROGRESS NOTES
[facility] GERIATRICS  Face to Face and Medical Necessity Statement for DME Provider visit    Patient: Vashti King  Gender: male  YOB: 1945  [place] Medical Record Number: [id number]  Demographics:  [address] [place] [address]  [phone] (home)   Social Security Number: [id number]  Primary Care Provider: Edison Banda  Insurance: Payor: MEDICARE / Plan: MEDICARE / Product Type: Medicare /     HPI: Vashti King is a 77 year old (1945), who is being seen today for a face to face provider visit at [facility] ([facility]) [[id number]]. Medical necessity statement for DME included.     This patient requires the following: DME Ordered and Medical Necessity Statement   Manual wheelchair  Size: 18 x 18   Leg rests: standard  Cushion: seat    The patient has mobility limitation that significantly impairs their ability to participate in one or more mobility related activities: Toileting, Feeding, Grooming and Bathing due to (M54.50,  G89.29) Chronic midline low back pain without sciatica and (M62.81) Generalized muscle weakness. The patient's mobility limitations cannot be safely resolved by using a cane/walker. Patient and/or caregiver is able to safely use the manual wheelchair. Patient's functional mobility deficit can be sufficiently resolved by the use of a manual wheelchair. Patient's home provides adequate access between rooms, maneuvering space, and surfaces for use of a manual wheelchair. The patient has not expressed unwillingness to use a manual wheelchair in the home.       Pt needing above DME with expected length of need of 99 months due to medical necessity associated with following diagnosis:     Chronic midline low back pain without sciatica  Generalized muscle weakness      Past Medical History:   has a past medical history of BPH (benign prostatic hyperplasia), Diabetes mellitus (H), Diabetes mellitus, type II (H), High cholesterol, Hyperlipidemia, and  "Hypertension.    Review of Systems:  4 point ROS including Respiratory, CV, GI and , other than that noted in the HPI,  is negative    Exam:  Vitals: /66   Pulse 67   Temp 97.5  F (36.4  C)   Resp 18   Ht 1.651 m (5' 5\")   Wt 74.8 kg (165 lb)   SpO2 97%   BMI 27.46 kg/m    BMI: Body mass index is 27.46 kg/m .   GENERAL APPEARANCE:  Alert, in no distress  ENT:  Mouth and posterior oropharynx normal, moist mucous membranes  EYES:  EOM normal, conjunctiva and lids normal  RESP:  no respiratory distress  CV:  peripheral edema 2+ in feet  PSYCH:  insight and judgement impaired, memory impaired , affect abnormal flat      Assessment/Plan:  1. Chronic midline low back pain without sciatica    2. Generalized muscle weakness        Orders:  1. Facility staff/TC to contact DME company to get their order form for provider to fill out    ELECTRONICALLY SIGNED BY PECOS CERTIFIED PROVIDER: BERTO Witt CNP   NPI: [id number]  [facility] [address] [place] [address]      Addendum:  The patient's mobility limitations cannot be safely resolved by using a cane/walker due to generalized weakness and back pain, he can walk a maximum of 200 feet.   BERTO Witt CNP on 6/29/2023 at 4:49 PM        "

## 2023-06-09 NOTE — LETTER
6/9/2023        RE: Vashti King  [address]  [place] [address]        [facility] GERIATRICS  Face to Face and Medical Necessity Statement for DME Provider visit    Patient: Vashti King  Gender: male  YOB: 1945  [place] Medical Record Number: [id number]  Demographics:  [address] [place] [address]  [phone] (home)   Social Security Number: [id number]  Primary Care Provider: Edison Banda  Insurance: Payor: MEDICARE / Plan: MEDICARE / Product Type: Medicare /     HPI: Vashti King is a 77 year old (1945), who is being seen today for a face to face provider visit at [facility] ([facility]) [[id number]]. Medical necessity statement for DME included.     This patient requires the following: DME Ordered and Medical Necessity Statement   Manual wheelchair  Size: 18 x 18   Leg rests: standard  Cushion: seat    The patient has mobility limitation that significantly impairs their ability to participate in one or more mobility related activities: Toileting, Feeding, Grooming and Bathing due to (M54.50,  G89.29) Chronic midline low back pain without sciatica and (M62.81) Generalized muscle weakness. The patient's mobility limitations cannot be safely resolved by using a cane/walker. Patient and/or caregiver is able to safely use the manual wheelchair. Patient's functional mobility deficit can be sufficiently resolved by the use of a manual wheelchair. Patient's home provides adequate access between rooms, maneuvering space, and surfaces for use of a manual wheelchair. The patient has not expressed unwillingness to use a manual wheelchair in the home.       Pt needing above DME with expected length of need of 99 months due to medical necessity associated with following diagnosis:     Chronic midline low back pain without sciatica  Generalized muscle weakness      Past Medical History:   has a past medical history of BPH (benign prostatic hyperplasia), Diabetes mellitus (H),  "Diabetes mellitus, type II (H), High cholesterol, Hyperlipidemia, and Hypertension.    Review of Systems:  4 point ROS including Respiratory, CV, GI and , other than that noted in the HPI,  is negative    Exam:  Vitals: /66   Pulse 67   Temp 97.5  F (36.4  C)   Resp 18   Ht 1.651 m (5' 5\")   Wt 74.8 kg (165 lb)   SpO2 97%   BMI 27.46 kg/m    BMI: Body mass index is 27.46 kg/m .   GENERAL APPEARANCE:  Alert, in no distress  ENT:  Mouth and posterior oropharynx normal, moist mucous membranes  EYES:  EOM normal, conjunctiva and lids normal  RESP:  no respiratory distress  CV:  peripheral edema 2+ in feet  PSYCH:  insight and judgement impaired, memory impaired , affect abnormal flat      Assessment/Plan:  1. Chronic midline low back pain without sciatica    2. Generalized muscle weakness        Orders:  1. Facility staff/TC to contact [employer] company to get their order form for provider to fill out    ELECTRONICALLY SIGNED BY MADDIE CERTIFIED PROVIDER: BERTO iWtt CNP   NPI: [id number]  [facility]  [address] [place] [address]              Sincerely,        BERTO Witt CNP      "

## 2023-06-12 ENCOUNTER — TRANSITIONAL CARE UNIT VISIT (OUTPATIENT)
Dept: GERIATRICS | Facility: CLINIC | Age: 78
End: 2023-06-12
Payer: MEDICARE

## 2023-06-12 VITALS
TEMPERATURE: 97.8 F | DIASTOLIC BLOOD PRESSURE: 76 MMHG | HEIGHT: 65 IN | RESPIRATION RATE: 17 BRPM | OXYGEN SATURATION: 98 % | HEART RATE: 70 BPM | WEIGHT: 165 LBS | BODY MASS INDEX: 27.49 KG/M2 | SYSTOLIC BLOOD PRESSURE: 130 MMHG

## 2023-06-12 DIAGNOSIS — Z53.9 ERRONEOUS ENCOUNTER--DISREGARD: Primary | ICD-10-CM

## 2023-06-12 NOTE — LETTER
6/12/2023        RE: Vashti King  [address] [place] [address]          This encounter was opened in error. Please disregard.        Sincerely,        Margie Hager MD

## 2023-06-13 ENCOUNTER — DISCHARGE SUMMARY NURSING HOME (OUTPATIENT)
Dept: GERIATRICS | Facility: CLINIC | Age: 78
End: 2023-06-13
Payer: MEDICARE

## 2023-06-13 VITALS
TEMPERATURE: 97.8 F | BODY MASS INDEX: 27.26 KG/M2 | SYSTOLIC BLOOD PRESSURE: 130 MMHG | HEART RATE: 70 BPM | RESPIRATION RATE: 17 BRPM | OXYGEN SATURATION: 98 % | DIASTOLIC BLOOD PRESSURE: 76 MMHG | HEIGHT: 65 IN | WEIGHT: 163.6 LBS

## 2023-06-13 DIAGNOSIS — E87.1 HYPONATREMIA: ICD-10-CM

## 2023-06-13 DIAGNOSIS — R53.81 PHYSICAL DECONDITIONING: Primary | ICD-10-CM

## 2023-06-13 DIAGNOSIS — M54.50 ACUTE MIDLINE LOW BACK PAIN WITHOUT SCIATICA: ICD-10-CM

## 2023-06-13 DIAGNOSIS — I10 ESSENTIAL HYPERTENSION: ICD-10-CM

## 2023-06-13 DIAGNOSIS — G63 POLYNEUROPATHY ASSOCIATED WITH UNDERLYING DISEASE (H): ICD-10-CM

## 2023-06-13 DIAGNOSIS — R41.89 COGNITIVE IMPAIRMENT: ICD-10-CM

## 2023-06-13 DIAGNOSIS — E11.42 TYPE 2 DIABETES MELLITUS WITH DIABETIC POLYNEUROPATHY, WITHOUT LONG-TERM CURRENT USE OF INSULIN (H): ICD-10-CM

## 2023-06-13 PROCEDURE — 99316 NF DSCHRG MGMT 30 MIN+: CPT | Performed by: NURSE PRACTITIONER

## 2023-06-13 NOTE — PROGRESS NOTES
[facility] GERIATRICS DISCHARGE SUMMARY  PATIENT'S NAME: Vashti King  YOB: 1945  MEDICAL RECORD NUMBER:  [id number]  Place of Service where encounter took place:  [facility] ([facility]) [[id number]]    PRIMARY CARE PROVIDER AND CLINIC RESPONSIBLE AFTER TRANSFER:   Edison Banda MD, [address] / [place] [address]    [facility] [place] [facility] to the [facility] Provider     Transferring providers: Deisy THOMSON CNP; Margie Hager MD  Recent Hospitalization/ED:  Hospital  [facility] stay 5/9/23 to 5/16/23 .  Date of SNF Admission: May 16, 2023  Date of SNF (anticipated) Discharge: June 17, 2023  Discharged to: previous independent home  Physical Function: Ambulating 200 ft with 2WW  DME: Wheelchair    CODE STATUS/ADVANCE DIRECTIVES DISCUSSION:  Full Code   ALLERGIES: Amlodipine and Hydrochlorothiazide    NURSING FACILITY COURSE   Medication Changes/Rationale:     Lasix and NaCl decreased to daily    Fluid restriction stopped    Glipizide changed from BID to qam due to hypoglycemia    Summary of nursing facility stay:   Vashti King  is a 77 year old  (1945), admitted to the above facility from  [facility]. Hospital stay 5/9/23 through 5/16/23. Patient with PMH HTN, DM2, BPH presented to the ED with N/V. He was seen in the ED a few days prior for burning sensation of hands and feet and was started on gabapentin. He was found to have sodium level of 120 on admission. Hydrochlorothiazide was stopped. Sodium dropped as low as 111. Currently on sodium tabs, fluid restriction and lasix. Gabapentin reduced. Due to uncontrolled back pain, started on Tylenol and lidocaine patches. Tramadol prn ordered for severe pain.     Physical deconditioning  Patient complains again today about how weak he is and wondering if there is any medication that can help. Provider advises him that he has certainly made improvement with therapy and he should continue to work hard. He was having issues  with his LLE kicking out while walking, and this has lessened. A wheelchair has been ordered for use at discharge. He will have home PT    Polyneuropathy associated with underlying disease (H)  Patient is primarily concerned with numbness in his hands. Due to memory impairment, he never remembers previous conversations regarding this. He is again advised that this is due to his diabetes and there is no medication available to improve the numbness    Type 2 diabetes mellitus with diabetic polyneuropathy, without long-term current use of insulin (H)  Evening dose of glipizide was stopped due to fasting sugars consistently 70-80s. He does have a few more 200s now, but this is preferred over hypoglycemia.     Hyponatremia  Fluid restriction stopped 2 weeks ago. Lasix and NaCl reduced from BID to daily on 6/6/23. Na level remained at 140. He is scheduled to see nephrology today. It is possible that they will stop the medications.     Cognitive impairment  Patient has notable memory impairment. He asks the same questions and has the same concerns at every visit. He does have good social support between his wife and children.     Essential hypertension  Well controlled    Acute midline low back pain without sciatica  This started acutely in the hospital. Today patient says that his back pain is gone. Will send home the remaining tramadol, but would not expect him to need this ongoing      Discharge Medications:    Current Outpatient Medications   Medication Sig Dispense Refill     acetaminophen (TYLENOL) 325 MG tablet Take 3 tablets (975 mg) by mouth At Bedtime And 975mg BID prn 180 tablet 0     aspirin (ASA) 81 MG EC tablet Take 1 tablet (81 mg) by mouth daily 90 tablet 3     atorvastatin (LIPITOR) 40 MG tablet TAKE 1 TABLET BY MOUTH EVERY DAY 90 tablet 2     blood glucose (CONTOUR TEST) test strip Use to test blood sugar once daily or as directed. 100 strip 3     carvedilol (COREG) 25 MG tablet Take 2 tablets (50 mg) by  "mouth 2 times daily (with meals) 60 tablet 0     diclofenac (VOLTAREN) 1 % topical gel Apply 2 g topically 3 times daily To low back       diltiazem ER COATED BEADS (CARDIZEM CD/CARTIA XT) 240 MG 24 hr capsule TAKE 1 CAPSULE BY MOUTH EVERY DAY 90 capsule 1     furosemide (LASIX) 20 MG tablet Take 1 tablet (20 mg) by mouth daily 60 tablet 0     gabapentin (NEURONTIN) 100 MG capsule Take 2 capsules (200 mg) by mouth At Bedtime 30 capsule 0     generic lancets (FINGERSTIX LANCETS) [GENERIC LANCETS (FINGERSTIX LANCETS)] Check blood sugar twice daily.  Uses Insulin.  Diagnosis E11.9 100 each 1     glipiZIDE (GLUCOTROL) 5 MG tablet Take 2 tablets (10 mg) by mouth daily 360 tablet 0     INVOKANA 100 MG tablet TAKE 1 TABLET (100 MG) BY MOUTH EVERY MORNING (BEFORE BREAKFAST) 90 tablet 0     lisinopril (ZESTRIL) 40 MG tablet TAKE 1 TABLET BY MOUTH EVERY DAY 90 tablet 2     melatonin 1 MG TABS tablet Take 1 tablet (1 mg) by mouth At Bedtime 30 tablet 0     metFORMIN (GLUCOPHAGE XR) 500 MG 24 hr tablet TAKE 2 TABLETS BY MOUTH TWICE A DAY WITH FOOD 360 tablet 3     Microlet Lancets MISC 100 each daily Use to test blood sugar once daily. 100 each 4     pen needle, diabetic 32 gauge x 1/4\" Ndle [PEN NEEDLE, DIABETIC 32 GAUGE X 1/4\" NDLE] Use as needed with insulin 100 each 1     polyethylene glycol (MIRALAX) 17 GM/Dose powder Take 17 g by mouth daily 510 g 0     potassium chloride ER (K-TAB) 20 MEQ CR tablet Take 1 tablet (20 mEq) by mouth daily 30 tablet 0     senna-docusate (SENOKOT-S/PERICOLACE) 8.6-50 MG tablet Take 1 tablet by mouth 2 times daily 60 tablet 0     sodium chloride 1 GM tablet Take 2 tablets (2 g) by mouth daily 60 tablet 0     traMADol (ULTRAM) 50 MG tablet Take 1 tablet (50 mg) by mouth 2 times daily as needed for severe pain 30 tablet 0        Controlled medications:   remaining tramadol to be sent     Past Medical History:   Past Medical History:   Diagnosis Date     BPH (benign prostatic hyperplasia)      " "Diabetes mellitus (H)      Diabetes mellitus, type II (H)      High cholesterol      Hyperlipidemia      Hypertension      Physical Exam:   Vitals: /76   Pulse 70   Temp 97.8  F (36.6  C)   Resp 17   Ht 1.651 m (5' 5\")   Wt 74.2 kg (163 lb 9.6 oz)   SpO2 98%   BMI 27.22 kg/m    BMI: Body mass index is 27.22 kg/m .  GENERAL APPEARANCE:  Alert, in no distress  ENT:  Mouth and posterior oropharynx normal, moist mucous membranes  EYES:  EOM normal, conjunctiva and lids normal  RESP:  no respiratory distress  CV:  no edema  SKIN:  Inspection of skin and subcutaneous tissue baseline  PSYCH:  insight and judgement impaired, memory impaired      SNF labs: Labs done in SNF are in [place]. Please refer to them using [employer] Everywhere.    DISCHARGE PLAN:    Follow up labs: No labs orders/due    Medical Follow Up:      Follow up with primary care provider in 1-2 weeks    Discharge Services: Home Care:  Physical Therapy      TOTAL DISCHARGE TIME:   Greater than 30 minutes  Electronically signed by:  BERTO Witt CNP       Documentation of Face to Face and Certification for Home Health Services    I certify that services are/were furnished while this patient was under the care of a physician and that a physician or an allowed non-physician practitioner (NPP), had a face-to-face encounter that meets the physician face-to-face encounter requirements. The encounter was in whole, or in part, related to the primary reason for home health. The patient is confined to his/her home and needs intermittent skilled nursing, physical therapy, speech-language pathology, or the continued need for occupational therapy. A plan of care has been established by a physician and is periodically reviewed by a physician.  Date of Face-to-Face Encounter: 6/13/2023.    I certify that, based on my findings, the following services are medically necessary home health services: Physical Therapy.    My clinical findings support " the need for the above skilled services because: Requires assistance of another person or specialized equipment to access medical services because patient: Is prone to wander/get lost without assistance...    Patient to re-establish plan of care with their PCP within 7-10 days after leaving the facility to reestablish care.  Medicare certified PECOS provider: BERTO Witt CNP  Date: June 13, 2023

## 2023-06-13 NOTE — LETTER
6/13/2023        RE: Vashti King  [address]  [place] [address]        [facility] GERIATRICS DISCHARGE SUMMARY  PATIENT'S NAME: Vashti King  YOB: 1945  MEDICAL RECORD NUMBER:  [id number]  Place of Service where encounter took place:  [facility] ([facility]) [[id number]]    PRIMARY CARE PROVIDER AND CLINIC RESPONSIBLE AFTER TRANSFER:   Edison Banda MD, [address] / [place] [address]    [facility] Provider     Transferring providers: Deisy THOMSON CNP; Margie Hager MD  Recent Hospitalization/ED:  Hospital  [facility] stay 5/9/23 to 5/16/23 .  Date of SNF Admission: May 16, 2023  Date of SNF (anticipated) Discharge: June 17, 2023  Discharged to: previous independent home  Physical Function: Ambulating 200 ft with 2WW  DME: Wheelchair    CODE STATUS/ADVANCE DIRECTIVES DISCUSSION:  Full Code   ALLERGIES: Amlodipine and Hydrochlorothiazide    NURSING FACILITY COURSE   Medication Changes/Rationale:     Lasix and NaCl decreased to daily    Fluid restriction stopped    Glipizide changed from BID to qam due to hypoglycemia    Summary of nursing facility stay:   Vashti King  is a 77 year old  (1945), admitted to the above facility from  [facility]. Hospital stay 5/9/23 through 5/16/23. Patient with PMH HTN, DM2, BPH presented to the ED with N/V. He was seen in the ED a few days prior for burning sensation of hands and feet and was started on gabapentin. He was found to have sodium level of 120 on admission. Hydrochlorothiazide was stopped. Sodium dropped as low as 111. Currently on sodium tabs, fluid restriction and lasix. Gabapentin reduced. Due to uncontrolled back pain, started on Tylenol and lidocaine patches. Tramadol prn ordered for severe pain.     Physical deconditioning  Patient complains again today about how weak he is and wondering if there is any medication that can help. Provider advises him that he has certainly made  improvement with therapy and he should continue to work hard. He was having issues with his LLE kicking out while walking, and this has lessened. A wheelchair has been ordered for use at discharge. He will have home PT    Polyneuropathy associated with underlying disease (H)  Patient is primarily concerned with numbness in his hands. Due to memory impairment, he never remembers previous conversations regarding this. He is again advised that this is due to his diabetes and there is no medication available to improve the numbness    Type 2 diabetes mellitus with diabetic polyneuropathy, without long-term current use of insulin (H)  Evening dose of glipizide was stopped due to fasting sugars consistently 70-80s. He does have a few more 200s now, but this is preferred over hypoglycemia.     Hyponatremia  Fluid restriction stopped 2 weeks ago. Lasix and NaCl reduced from BID to daily on 6/6/23. Na level remained at 140. He is scheduled to see nephrology today. It is possible that they will stop the medications.     Cognitive impairment  Patient has notable memory impairment. He asks the same questions and has the same concerns at every visit. He does have good social support between his wife and children.     Essential hypertension  Well controlled    Acute midline low back pain without sciatica  This started acutely in the hospital. Today patient says that his back pain is gone. Will send home the remaining tramadol, but would not expect him to need this ongoing      Discharge Medications:    Current Outpatient Medications   Medication Sig Dispense Refill     acetaminophen (TYLENOL) 325 MG tablet Take 3 tablets (975 mg) by mouth At Bedtime And 975mg BID prn 180 tablet 0     aspirin (ASA) 81 MG EC tablet Take 1 tablet (81 mg) by mouth daily 90 tablet 3     atorvastatin (LIPITOR) 40 MG tablet TAKE 1 TABLET BY MOUTH EVERY DAY 90 tablet 2     blood glucose (CONTOUR TEST) test strip Use to test blood sugar once daily or as  "directed. 100 strip 3     carvedilol (COREG) 25 MG tablet Take 2 tablets (50 mg) by mouth 2 times daily (with meals) 60 tablet 0     diclofenac (VOLTAREN) 1 % topical gel Apply 2 g topically 3 times daily To low back       diltiazem ER COATED BEADS (CARDIZEM CD/CARTIA XT) 240 MG 24 hr capsule TAKE 1 CAPSULE BY MOUTH EVERY DAY 90 capsule 1     furosemide (LASIX) 20 MG tablet Take 1 tablet (20 mg) by mouth daily 60 tablet 0     gabapentin (NEURONTIN) 100 MG capsule Take 2 capsules (200 mg) by mouth At Bedtime 30 capsule 0     generic lancets (FINGERSTIX LANCETS) [GENERIC LANCETS (FINGERSTIX LANCETS)] Check blood sugar twice daily.  Uses Insulin.  Diagnosis E11.9 100 each 1     glipiZIDE (GLUCOTROL) 5 MG tablet Take 2 tablets (10 mg) by mouth daily 360 tablet 0     INVOKANA 100 MG tablet TAKE 1 TABLET (100 MG) BY MOUTH EVERY MORNING (BEFORE BREAKFAST) 90 tablet 0     lisinopril (ZESTRIL) 40 MG tablet TAKE 1 TABLET BY MOUTH EVERY DAY 90 tablet 2     melatonin 1 MG TABS tablet Take 1 tablet (1 mg) by mouth At Bedtime 30 tablet 0     metFORMIN (GLUCOPHAGE XR) 500 MG 24 hr tablet TAKE 2 TABLETS BY MOUTH TWICE A DAY WITH FOOD 360 tablet 3     Microlet Lancets MISC 100 each daily Use to test blood sugar once daily. 100 each 4     pen needle, diabetic 32 gauge x 1/4\" Ndle [PEN NEEDLE, DIABETIC 32 GAUGE X 1/4\" NDLE] Use as needed with insulin 100 each 1     polyethylene glycol (MIRALAX) 17 GM/Dose powder Take 17 g by mouth daily 510 g 0     potassium chloride ER (K-TAB) 20 MEQ CR tablet Take 1 tablet (20 mEq) by mouth daily 30 tablet 0     senna-docusate (SENOKOT-S/PERICOLACE) 8.6-50 MG tablet Take 1 tablet by mouth 2 times daily 60 tablet 0     sodium chloride 1 GM tablet Take 2 tablets (2 g) by mouth daily 60 tablet 0     traMADol (ULTRAM) 50 MG tablet Take 1 tablet (50 mg) by mouth 2 times daily as needed for severe pain 30 tablet 0        Controlled medications:   remaining tramadol to be sent     Past Medical History: " "  Past Medical History:   Diagnosis Date     BPH (benign prostatic hyperplasia)      Diabetes mellitus (H)      Diabetes mellitus, type II (H)      High cholesterol      Hyperlipidemia      Hypertension      Physical Exam:   Vitals: /76   Pulse 70   Temp 97.8  F (36.6  C)   Resp 17   Ht 1.651 m (5' 5\")   Wt 74.2 kg (163 lb 9.6 oz)   SpO2 98%   BMI 27.22 kg/m    BMI: Body mass index is 27.22 kg/m .  GENERAL APPEARANCE:  Alert, in no distress  ENT:  Mouth and posterior oropharynx normal, moist mucous membranes  EYES:  EOM normal, conjunctiva and lids normal  RESP:  no respiratory distress  CV:  no edema  SKIN:  Inspection of skin and subcutaneous tissue baseline  PSYCH:  insight and judgement impaired, memory impaired      SNF labs: Labs done in SNF are in [place] [facility]. Please refer to them using [employer] Everywhere.    DISCHARGE PLAN:    Follow up labs: No labs orders/due    Medical Follow Up:      Follow up with primary care provider in 1-2 weeks    Discharge Services: Home Care:  Physical Therapy      TOTAL DISCHARGE TIME:   Greater than 30 minutes  Electronically signed by:  BERTO Witt CNP       Documentation of Face to Face and Certification for Home Health Services    I certify that services are/were furnished while this patient was under the care of a physician and that a physician or an allowed non-physician practitioner (NPP), had a face-to-face encounter that meets the physician face-to-face encounter requirements. The encounter was in whole, or in part, related to the primary reason for home health. The patient is confined to his/her home and needs intermittent skilled nursing, physical therapy, speech-language pathology, or the continued need for occupational therapy. A plan of care has been established by a physician and is periodically reviewed by a physician.  Date of Face-to-Face Encounter: 6/13/2023.    I certify that, based on my findings, the following services are " medically necessary home health services: Physical Therapy.    My clinical findings support the need for the above skilled services because: Requires assistance of another person or specialized equipment to access medical services because patient: Is prone to wander/get lost without assistance...    Patient to re-establish plan of care with their PCP within 7-10 days after leaving the facility to reestablish care.  Medicare certified PECOS provider: BERTO Witt CNP  Date: June 13, 2023                    Sincerely,        BERTO Witt CNP

## 2023-06-19 ENCOUNTER — TELEPHONE (OUTPATIENT)
Dept: FAMILY MEDICINE | Facility: CLINIC | Age: 78
End: 2023-06-19
Payer: MEDICARE

## 2023-06-19 ENCOUNTER — MEDICAL CORRESPONDENCE (OUTPATIENT)
Dept: HEALTH INFORMATION MANAGEMENT | Facility: CLINIC | Age: 78
End: 2023-06-19

## 2023-06-22 ENCOUNTER — PATIENT OUTREACH (OUTPATIENT)
Dept: CARE COORDINATION | Facility: CLINIC | Age: 78
End: 2023-06-22
Payer: MEDICARE

## 2023-06-22 ENCOUNTER — TELEPHONE (OUTPATIENT)
Dept: FAMILY MEDICINE | Facility: CLINIC | Age: 78
End: 2023-06-22
Payer: MEDICARE

## 2023-06-22 DIAGNOSIS — E11.29 TYPE 2 DIABETES MELLITUS WITH MICROALBUMINURIA, WITHOUT LONG-TERM CURRENT USE OF INSULIN (H): ICD-10-CM

## 2023-06-22 DIAGNOSIS — E87.6 HYPOKALEMIA: ICD-10-CM

## 2023-06-22 DIAGNOSIS — R80.9 TYPE 2 DIABETES MELLITUS WITH MICROALBUMINURIA, WITHOUT LONG-TERM CURRENT USE OF INSULIN (H): ICD-10-CM

## 2023-06-22 DIAGNOSIS — I10 ESSENTIAL HYPERTENSION: ICD-10-CM

## 2023-06-22 DIAGNOSIS — E11.9 TYPE 2 DIABETES MELLITUS WITHOUT COMPLICATION (H): ICD-10-CM

## 2023-06-22 DIAGNOSIS — E87.1 HYPONATREMIA: ICD-10-CM

## 2023-06-22 DIAGNOSIS — N18.31 CHRONIC KIDNEY DISEASE, STAGE 3A (H): ICD-10-CM

## 2023-06-22 RX ORDER — CARVEDILOL 25 MG/1
50 TABLET ORAL 2 TIMES DAILY WITH MEALS
Qty: 56 TABLET | Refills: 0 | Status: SHIPPED | OUTPATIENT
Start: 2023-06-22 | End: 2023-07-13

## 2023-06-22 RX ORDER — METFORMIN HCL 500 MG
1000 TABLET, EXTENDED RELEASE 24 HR ORAL 2 TIMES DAILY
Qty: 56 TABLET | Refills: 0 | Status: SHIPPED | OUTPATIENT
Start: 2023-06-22 | End: 2023-08-15

## 2023-06-22 RX ORDER — FUROSEMIDE 20 MG
20 TABLET ORAL DAILY
Qty: 14 TABLET | Refills: 0 | Status: SHIPPED | OUTPATIENT
Start: 2023-06-22 | End: 2023-07-13

## 2023-06-22 RX ORDER — POTASSIUM CHLORIDE 1500 MG/1
20 TABLET, EXTENDED RELEASE ORAL DAILY
Qty: 14 TABLET | Refills: 0 | Status: SHIPPED | OUTPATIENT
Start: 2023-06-22 | End: 2023-07-13

## 2023-06-22 NOTE — TELEPHONE ENCOUNTER
Zhane hartman's occupational therapist called in regarding pt, wanted to know if  help with any medical equipment. Please advise.   Please give her a call back at [phone]

## 2023-06-22 NOTE — TELEPHONE ENCOUNTER
General Call    Contacts       Type Contact Phone/Fax    06/22/2023 11:50 AM CDT Phone (Incoming) [id number] [place], [place] - [address] [facility] (Pharmacy) [phone]        Reason for Call:  Medication     What are your questions or concerns:  [facility] called and received a medication list from transition care unit from his discharge. They wanted to talk to  about the list and to see if he can ok the refill     Date of last appointment with provider: 06/01/2023    Could we send this information to you in [employer] or would you prefer to receive a phone call?:   Patient would prefer a phone call   Okay to leave a detailed message?: Yes at Other phone number:  [phone]*

## 2023-06-22 NOTE — TELEPHONE ENCOUNTER
[facility] an established patient with [facility].       Requesting orders from: Edison Banda  Provider is following patient: No - has hospital f/u scheduled for 7/8/23 with PCP  Orders Requested:    1. Skilled Nursing  Request for initial certification (first set of orders)  -eval & treat, will call with frequency after initial eval  -med set up, med education    2. Physical Therapy  Did initial eval today, requesting continuation of care  Frequency: 2x/wk for 4 wks  then 1x/wk for 3 wks  Working on core strength, walking independently to prevent additional falls    3. Occupational Therapy  Request for initial certification (first set of orders)  -eval & treat, will call back with frequency after initial eval    4. Social Work  Request for initial certification (first set of orders) Frequency: eval & treat   -[facility]    5. Home Health Aid  Request for initial certification (first set of orders) Frequency: 1x/wk for 8 wks   -assistance with showers and hygiene cares    Confirmed ok to leave a detailed message with call back.  Contact information confirmed and updated as needed.   Confirmed will fax orders to clinic for PCP sign-off.    Ingrid Cuevas RN    
I approve of requested orders.    Edison Banda MD    
Verbal orders given to Iker   
no vomiting/no fever/no chills

## 2023-06-23 NOTE — TELEPHONE ENCOUNTER
Contacted [facility] and medications were refilled by Lela Allison CNP at [facility].  No further action needed.    Sabiha Shankar RN  [facility]

## 2023-06-26 ENCOUNTER — TELEPHONE (OUTPATIENT)
Dept: FAMILY MEDICINE | Facility: CLINIC | Age: 78
End: 2023-06-26

## 2023-06-26 DIAGNOSIS — R06.09 EXERTIONAL DYSPNEA: ICD-10-CM

## 2023-06-26 DIAGNOSIS — Z53.9 DIAGNOSIS NOT YET DEFINED: Primary | ICD-10-CM

## 2023-06-26 DIAGNOSIS — G62.9 POLYNEUROPATHY: Primary | ICD-10-CM

## 2023-06-26 PROCEDURE — 99207 PR MD CERTIFICATION HHA PATIENT: CPT | Performed by: FAMILY MEDICINE

## 2023-06-26 NOTE — TELEPHONE ENCOUNTER
Zhane, OT with [facility] calling to request orders following initial eval:    1x/week for 1 week  Then 2x/week for 2 weeks  Then 1x/week for 1 week  For ADLs and home safety    Zhane also requests DME orders for: manual wheelchair and bathroom grab bar. Orders pended.    Care Coordination Team-- OT requesting call back to provide payor source information to obtain payment for DME. Please contact Zhane at [phone].

## 2023-07-02 DIAGNOSIS — I10 ESSENTIAL HYPERTENSION: ICD-10-CM

## 2023-07-02 RX ORDER — DILTIAZEM HYDROCHLORIDE 240 MG/1
CAPSULE, COATED, EXTENDED RELEASE ORAL
Qty: 90 CAPSULE | Refills: 0 | Status: SHIPPED | OUTPATIENT
Start: 2023-07-02 | End: 2023-09-13

## 2023-07-02 NOTE — TELEPHONE ENCOUNTER
"Last Written Prescription Date:  11/28/2022  Last Fill Quantity: 90,  # refills: 1   Last office visit provider:  8/30/2022     Requested Prescriptions   Pending Prescriptions Disp Refills     diltiazem ER COATED BEADS (CARDIZEM CD/CARTIA XT) 240 MG 24 hr capsule [Pharmacy Med Name: DILTIAZEM 24H ER(CD) 240 MG CP] 90 capsule 1     Sig: TAKE 1 CAPSULE BY MOUTH EVERY DAY       Calcium Channel Blockers Protocol  Passed - 7/2/2023  7:23 AM        Passed - Blood pressure under 140/90 in past 12 months     BP Readings from Last 3 Encounters:   06/13/23 130/76   06/12/23 130/76   06/09/23 133/66                 Passed - Normal ALT in past 12 months     Recent Labs   Lab Test 05/10/23  0529   ALT 19             Passed - Recent (12 mo) or future (30 days) visit within the authorizing provider's specialty     Patient has had an office visit with the authorizing provider or a provider within the authorizing providers department within the previous 12 mos or has a future within next 30 days. See \"Patient Info\" tab in inbasket, or \"Choose Columns\" in Meds & Orders section of the refill encounter.              Passed - Medication is active on med list        Passed - Patient is age 18 or older        Passed - Normal serum creatinine on file in past 12 months     Recent Labs   Lab Test 06/09/23  0630   CR 0.86       Ok to refill medication if creatinine is low               Margie Del Rosario RN 07/02/23 10:24 AM  "

## 2023-07-05 ENCOUNTER — MEDICAL CORRESPONDENCE (OUTPATIENT)
Dept: HEALTH INFORMATION MANAGEMENT | Facility: CLINIC | Age: 78
End: 2023-07-05

## 2023-07-05 ENCOUNTER — OFFICE VISIT (OUTPATIENT)
Dept: FAMILY MEDICINE | Facility: CLINIC | Age: 78
End: 2023-07-05
Payer: MEDICARE

## 2023-07-05 VITALS
OXYGEN SATURATION: 96 % | DIASTOLIC BLOOD PRESSURE: 66 MMHG | RESPIRATION RATE: 21 BRPM | TEMPERATURE: 97.3 F | SYSTOLIC BLOOD PRESSURE: 97 MMHG | HEART RATE: 71 BPM

## 2023-07-05 DIAGNOSIS — I10 ESSENTIAL HYPERTENSION: ICD-10-CM

## 2023-07-05 DIAGNOSIS — R80.9 TYPE 2 DIABETES MELLITUS WITH MICROALBUMINURIA, WITHOUT LONG-TERM CURRENT USE OF INSULIN (H): Primary | ICD-10-CM

## 2023-07-05 DIAGNOSIS — E11.29 TYPE 2 DIABETES MELLITUS WITH MICROALBUMINURIA, WITHOUT LONG-TERM CURRENT USE OF INSULIN (H): Primary | ICD-10-CM

## 2023-07-05 DIAGNOSIS — R53.81 PHYSICAL DECONDITIONING: ICD-10-CM

## 2023-07-05 DIAGNOSIS — E83.52 HYPERCALCEMIA: ICD-10-CM

## 2023-07-05 DIAGNOSIS — E87.1 HYPONATREMIA: ICD-10-CM

## 2023-07-05 DIAGNOSIS — G62.9 POLYNEUROPATHY: ICD-10-CM

## 2023-07-05 LAB
ALBUMIN SERPL BCG-MCNC: 4.3 G/DL (ref 3.5–5.2)
ANION GAP SERPL CALCULATED.3IONS-SCNC: 13 MMOL/L (ref 7–15)
BUN SERPL-MCNC: 29 MG/DL (ref 8–23)
CALCIUM SERPL-MCNC: 10 MG/DL (ref 8.8–10.2)
CHLORIDE SERPL-SCNC: 100 MMOL/L (ref 98–107)
CREAT SERPL-MCNC: 0.95 MG/DL (ref 0.67–1.17)
DEPRECATED CALCIDIOL+CALCIFEROL SERPL-MC: 29 UG/L (ref 20–75)
DEPRECATED HCO3 PLAS-SCNC: 24 MMOL/L (ref 22–29)
GFR SERPL CREATININE-BSD FRML MDRD: 83 ML/MIN/1.73M2
GLUCOSE SERPL-MCNC: 227 MG/DL (ref 70–99)
HBA1C MFR BLD: 6.9 % (ref 0–5.6)
HGB BLD-MCNC: 15.2 G/DL (ref 13.3–17.7)
PHOSPHATE SERPL-MCNC: 4.2 MG/DL (ref 2.5–4.5)
POTASSIUM SERPL-SCNC: 4.7 MMOL/L (ref 3.4–5.3)
PTH-INTACT SERPL-MCNC: 38 PG/ML (ref 15–65)
SODIUM SERPL-SCNC: 137 MMOL/L (ref 136–145)

## 2023-07-05 PROCEDURE — 83970 ASSAY OF PARATHORMONE: CPT | Performed by: FAMILY MEDICINE

## 2023-07-05 PROCEDURE — 85018 HEMOGLOBIN: CPT | Performed by: FAMILY MEDICINE

## 2023-07-05 PROCEDURE — 82306 VITAMIN D 25 HYDROXY: CPT | Performed by: FAMILY MEDICINE

## 2023-07-05 PROCEDURE — 36415 COLL VENOUS BLD VENIPUNCTURE: CPT | Performed by: FAMILY MEDICINE

## 2023-07-05 PROCEDURE — 83036 HEMOGLOBIN GLYCOSYLATED A1C: CPT | Performed by: FAMILY MEDICINE

## 2023-07-05 PROCEDURE — 80069 RENAL FUNCTION PANEL: CPT | Performed by: FAMILY MEDICINE

## 2023-07-05 PROCEDURE — 99215 OFFICE O/P EST HI 40 MIN: CPT | Performed by: FAMILY MEDICINE

## 2023-07-05 RX ORDER — LISINOPRIL 20 MG/1
20 TABLET ORAL DAILY
Qty: 90 TABLET | Refills: 0 | Status: SHIPPED | OUTPATIENT
Start: 2023-07-05 | End: 2023-09-12

## 2023-07-05 ASSESSMENT — ENCOUNTER SYMPTOMS: NUMBNESS: 1

## 2023-07-05 ASSESSMENT — PATIENT HEALTH QUESTIONNAIRE - PHQ9
SUM OF ALL RESPONSES TO PHQ QUESTIONS 1-9: 21
SUM OF ALL RESPONSES TO PHQ QUESTIONS 1-9: 21
10. IF YOU CHECKED OFF ANY PROBLEMS, HOW DIFFICULT HAVE THESE PROBLEMS MADE IT FOR YOU TO DO YOUR WORK, TAKE CARE OF THINGS AT HOME, OR GET ALONG WITH OTHER PEOPLE: EXTREMELY DIFFICULT

## 2023-07-05 NOTE — PROGRESS NOTES
Assessment & Plan     Type 2 diabetes mellitus with microalbuminuria, without long-term current use of insulin (H)  Fairly well controlled given recent A1c.  Discussed options for monitoring.  CGM discussed.  Family would like to proceed with that if they can get 1.  - Continuous Blood Gluc  (FREESTYLE CAN 2 READER) MARY JO  Dispense: 1 each; Refill: 0  - Continuous Blood Gluc Sensor (FREESTYLE CAN 2 SENSOR) MISC  Dispense: 2 each; Refill: 5  - Hemoglobin A1c    Physical deconditioning  Safety bar and incontinent supplies.  Patient has wheelchair prescription already.  I started to do that but canceled it, when I understood that it was already in process.  I also filled out handicap parking permit.  - Safety Bar Order  - Incontinence Supplies Order  - Primary Care - Care Coordination Referral    Hyponatremia  Now steady after cessation of hydrochlorothiazide.  Was profound when he was hospitalized.  Since now better I think we will continue gabapentin at the current dose.  Consider transition to Lyrica if he does not have great response.  Interestingly.  Patient's complaint is that they are cold more than a electric or neuropathic type of pain sensation.  I would recommend he follows up with nephrology as well.  - Adult Nephrology  Referral  - Renal panel (Alb, BUN, Ca, Cl, CO2, Creat, Gluc, Phos, K, Na)  - Hemoglobin    Essential hypertension  Looks overcontrolled at this point in time.  Recommend decreasing lisinopril.  Family wanted a new prescription rather than splitting old tabs.  New dose is 20 mg.  - lisinopril (ZESTRIL) 20 MG tablet  Dispense: 90 tablet; Refill: 0  - Home Blood Pressure Monitor Order for DME - ONLY FOR DME  - Renal panel (Alb, BUN, Ca, Cl, CO2, Creat, Gluc, Phos, K, Na)    Hypercalcemia  Normocalcemia today.  Check D and parathyroid hormone.  - Vitamin D Deficiency  - Parathyroid Hormone Intact    Polyneuropathy  See above discussion.    Total time exceeded 40 minutes  "including record review, examination room time, coordination of care, and documentation.       MED REC REQUIRED  Post Medication Reconciliation Status:   BMI:   Estimated body mass index is 27.22 kg/m  as calculated from the following:    Height as of 6/13/23: 1.651 m (5' 5\").    Weight as of 6/13/23: 74.2 kg (163 lb 9.6 oz).     Depression Screening Follow Up        7/5/2023     9:20 AM   PHQ   PHQ-9 Total Score 21   Q9: Thoughts of better off dead/self-harm past 2 weeks Not at all     Will need further follow-up on depression.    Follow Up Actions Taken  Crisis resource information provided in After Visit Summary     Edison Banda MD  [facility]    Moris Kingston is a 77 year old, presenting for the following health issues:  Hospital F/U and Numbness        7/5/2023     9:33 AM   Additional Questions   Roomed by M   Accompanied by son     Numbness  Associated symptoms include numbness.            6/22/2023     2:22 PM   Post Discharge Outreach   Admission Date 5/9/2023   Discharge Date 5/16/2023   Do you feel your condition is stable enough to be safe at home until your provider visit? Yes   Does the patient have their discharge instructions?  Yes   Does the patient have questions regarding their discharge instructions?  No   Were you started on any new medications or were there changes to any of your previous medications?  Yes   Does the patient have all of their medications? Yes   Do you have questions regarding any of your medications?  No   Do you have all of your needed medical supplies or equipment (DME)?  (i.e. oxygen tank, CPAP, cane, etc.) No - What equipment or supplies are needed?   Discharge follow-up appointment scheduled within 14 calendar days?  Yes   Discharge Follow Up Appointment Date 7/5/2023   Discharge Follow Up Appointment Scheduled with? Primary Care Provider     Hospital Follow-up Visit:    Hospital/Nursing Home/IP Rehab Facility: [facility]  Date of Admission: 5/9/23  Date of Discharge: 5/16/23  Reason(s) for Admission: back pain and DM    Patient here with 2 sons to follow-up on hospitalization which transitioned to TCU stay.  Discharge summary from hospital and from TCU reviewed.  Patient had severe hyponatremia.  Pression from combination of hydrochlorothiazide.  Per nephrology gabapentin might have also contributed some.  That was continued however.  Patient does have complaints of coldness in his hands and feet.  This has been treated as peripheral neuropathy in the past.  That is his main complaint today and everything else seems relatively secondary.  Sons do indicate that he needs quite a bit of help with medical equipment.  Blood pressure also somewhat low today.  Denies dizziness.  On a number of blood pressure medications.      Review of Systems   Neurological: Positive for numbness.            Objective    BP 97/66 (BP Location: Right arm, Patient Position: Sitting, Cuff Size: Adult Regular)   Pulse 71   Temp 97.3  F (36.3  C) (Temporal)   Resp 21   SpO2 96%   There is no height or weight on file to calculate BMI.  Physical Exam   GENERAL: alert, not distressed  CHEST: clear, no rales, rhonchi, or wheezes  CARDIAC: regular without murmur, gallop, or rub  ABDOMEN: soft, non tender, non distended, normal bowel sounds  Lower extremities: No edema or calf tenderness.      Results for orders placed or performed in visit on 07/05/23 (from the past 24 hour(s))   Renal panel (Alb, BUN, Ca, Cl, CO2, Creat, Gluc, Phos, K, Na)   Result Value Ref Range    Sodium 137 136 - 145 mmol/L    Potassium 4.7 3.4 - 5.3 mmol/L    Chloride 100 98 - 107 mmol/L    Carbon Dioxide (CO2) 24 22 - 29 mmol/L    Anion Gap 13 7 - 15 mmol/L    Glucose 227 (H) 70 - 99 mg/dL    Urea Nitrogen 29.0 (H) 8.0 - 23.0 mg/dL    Creatinine 0.95 0.67 - 1.17 mg/dL    GFR Estimate 83 >60 mL/min/1.73m2    Calcium 10.0 8.8 - 10.2 mg/dL    Albumin 4.3 3.5 - 5.2 g/dL     Phosphorus 4.2 2.5 - 4.5 mg/dL   Hemoglobin A1c   Result Value Ref Range    Hemoglobin A1C 6.9 (H) 0.0 - 5.6 %   Hemoglobin   Result Value Ref Range    Hemoglobin 15.2 13.3 - 17.7 g/dL   Vitamin D Deficiency   Result Value Ref Range    Vitamin D, Total (25-Hydroxy) 29 20 - 75 ug/L    Narrative    Season, race, dietary intake, and treatment affect the concentration of 25-hydroxy-Vitamin D. Values may decrease during winter months and increase during summer months. Values 20-29 ug/L may indicate Vitamin D insufficiency and values <20 ug/L may indicate Vitamin D deficiency.    Vitamin D determination is routinely performed by an immunoassay specific for 25 hydroxyvitamin D3.  If an individual is on vitamin D2(ergocalciferol) supplementation, please specify 25 OH vitamin D2 and D3 level determination by LCMSMS test VITD23.     Parathyroid Hormone Intact   Result Value Ref Range    Parathyroid Hormone Intact 38 15 - 65 pg/mL    Narrative    This result was obtained with the Roche Elecsys PTH STAT assay.   This reference range differs from PTH assays used in other [facility] laboratories.                 Answers for HPI/ROS submitted by the patient on 7/5/2023  If you checked off any problems, how difficult have these problems made it for you to do your work, take care of things at home, or get along with other people?: Extremely difficult  PHQ9 TOTAL SCORE: 21    Prior to immunization administration, verified patients identity using patient s name and date of birth. Please see Immunization Activity for additional information.     Screening Questionnaire for Adult Immunization    Are you sick today?   No   Do you have allergies to medications, food, a vaccine component or latex?   Yes   Have you ever had a serious reaction after receiving a vaccination?   No   Do you have a long-term health problem with heart, lung, kidney, or metabolic disease (e.g., diabetes), asthma, a blood disorder, no spleen, complement  component deficiency, a cochlear implant, or a spinal fluid leak?  Are you on long-term aspirin therapy?   Yes   Do you have cancer, leukemia, HIV/AIDS, or any other immune system problem?   No   Do you have a parent, brother, or sister with an immune system problem?   No   In the past 3 months, have you taken medications that affect  your immune system, such as prednisone, other steroids, or anticancer drugs; drugs for the treatment of rheumatoid arthritis, Crohn s disease, or psoriasis; or have you had radiation treatments?   No   Have you had a seizure, or a brain or other nervous system problem?   No   During the past year, have you received a transfusion of blood or blood    products, or been given immune (gamma) globulin or antiviral drug?   No   For women: Are you pregnant or is there a chance you could become       pregnant during the next month?   No   Have you received any vaccinations in the past 4 weeks?   No     Immunization questionnaire was positive for at least one answer.  Notified .      Patient instructed to remain in clinic for 15 minutes afterwards, and to report any adverse reactions.     Screening performed by JAGDEEP Mosquera MA on 7/5/2023 at 9:38 AM.

## 2023-07-05 NOTE — PROGRESS NOTES
DME (Durable Medical Equipment) Orders and Documentation  Orders Placed This Encounter   Procedures     Safety Bar Order     Incontinence Supplies Order      The patient was assessed and it was determined the patient is in need of the following listed DME Supplies/Equipment. Please complete supporting documentation below to demonstrate medical necessity.      Incontinence Supplies Documentation  Incontinence supplies are needed due to incontinence and deconditioning.  DME All Other Item(s) Documentation    List reason for need and supporting documentation for medical necessity below for each DME item.     1.  Deconditioning

## 2023-07-06 ENCOUNTER — TELEPHONE (OUTPATIENT)
Dept: FAMILY MEDICINE | Facility: CLINIC | Age: 78
End: 2023-07-06
Payer: MEDICARE

## 2023-07-10 ENCOUNTER — TELEPHONE (OUTPATIENT)
Dept: FAMILY MEDICINE | Facility: CLINIC | Age: 78
End: 2023-07-10
Payer: MEDICARE

## 2023-07-10 ENCOUNTER — MEDICAL CORRESPONDENCE (OUTPATIENT)
Dept: HEALTH INFORMATION MANAGEMENT | Facility: CLINIC | Age: 78
End: 2023-07-10
Payer: MEDICARE

## 2023-07-10 DIAGNOSIS — M54.16 LUMBAR BACK PAIN WITH RADICULOPATHY AFFECTING LOWER EXTREMITY: Primary | ICD-10-CM

## 2023-07-10 DIAGNOSIS — E87.1 HYPONATREMIA: ICD-10-CM

## 2023-07-10 DIAGNOSIS — M54.50 ACUTE MIDLINE LOW BACK PAIN WITHOUT SCIATICA: ICD-10-CM

## 2023-07-10 DIAGNOSIS — R80.9 TYPE 2 DIABETES MELLITUS WITH MICROALBUMINURIA, WITHOUT LONG-TERM CURRENT USE OF INSULIN (H): ICD-10-CM

## 2023-07-10 DIAGNOSIS — E11.29 TYPE 2 DIABETES MELLITUS WITH MICROALBUMINURIA, WITHOUT LONG-TERM CURRENT USE OF INSULIN (H): ICD-10-CM

## 2023-07-10 RX ORDER — LANCETS
EACH MISCELLANEOUS
Qty: 100 EACH | Refills: 4 | Status: SHIPPED | OUTPATIENT
Start: 2023-07-10 | End: 2024-07-15

## 2023-07-10 RX ORDER — BLOOD-GLUCOSE METER
KIT MISCELLANEOUS
Status: CANCELLED | OUTPATIENT
Start: 2023-07-10

## 2023-07-10 RX ORDER — BLOOD-GLUCOSE METER
1 EACH MISCELLANEOUS ONCE
Qty: 1 KIT | Refills: 0 | Status: SHIPPED | OUTPATIENT
Start: 2023-07-10 | End: 2023-07-10

## 2023-07-10 NOTE — TELEPHONE ENCOUNTER
New Medication Request        What medication are you requesting?:   diclofenac (VOLTAREN) 1 % topical gel  Contour glucose meter     Reason for medication request: Lesvia from [facility] called stated that pt would preferred to prick his finger for blood glucose testing. Lesvia states that pt have all supplies for Contour meter and just need the meter. Also, requesting if provider can order voltaren gel for pt as he is out of [facility], last order was from nursing home. Please look into request and order if applicable. Thank you.     Have you taken this medication before?: Yes:     Controlled Substance Agreement on file:   CSA -- Patient Level:    CSA: None found at the patient level.         Patient offered an appointment? No    Preferred Pharmacy:   [facility]/pharmacy #[id number] [address] [address]  Phone: [phone] Fax: [phone]      Could we send this information to you in [facility] [employer] or would you prefer to receive a phone call?:   Patient would prefer a phone call   Okay to leave a detailed message?: Yes at Home number on file [phone] (home)

## 2023-07-10 NOTE — TELEPHONE ENCOUNTER
Medication Question or Refill        What medication are you calling about (include dose and sig)?:    Disp Refills Start End RACHEL   sodium chloride 1 GM tablet 60 tablet 0 6/6/2023  No   Sig - Route: Take 2 tablets (2 g) by mouth daily - Oral      Disp Refills Start End RACHEL   traMADol (ULTRAM) 50 MG tablet 30 tablet 0 5/18/2023  No   Sig - Route: Take 1 tablet (50 mg) by mouth 2 times daily as needed for severe pain - Oral         Preferred Pharmacy:   [facility]/pharmacy #[id number] - [address] [address]  Phone: [phone] Fax: [phone]      Controlled Substance Agreement on file:   CSA -- Patient Level:    CSA: None found at the patient level.       Who prescribed the medication?: Deisy Allison CNP    Do you need a refill? Yes    When did you use the medication last? Lesvia from [facility] called and stated that pt is completely out of meds and in need of med ASAP.     Patient offered an appointment? No    Do you have any questions or concerns?  No      Could we send this information to you in [facility] or would you prefer to receive a phone call?:   Patient would prefer a phone call   Okay to leave a detailed message?: Yes at Home number on file [phone] (home)

## 2023-07-12 RX ORDER — TRAMADOL HYDROCHLORIDE 50 MG/1
50 TABLET ORAL 2 TIMES DAILY PRN
Qty: 30 TABLET | Refills: 0 | Status: SHIPPED | OUTPATIENT
Start: 2023-07-12 | End: 2023-08-16

## 2023-07-12 RX ORDER — SODIUM CHLORIDE 1 G/1
2 TABLET ORAL DAILY
Qty: 60 TABLET | Refills: 0 | Status: SHIPPED | OUTPATIENT
Start: 2023-07-12 | End: 2023-08-08

## 2023-07-13 DIAGNOSIS — E87.1 HYPONATREMIA: ICD-10-CM

## 2023-07-13 DIAGNOSIS — G62.9 POLYNEUROPATHY: ICD-10-CM

## 2023-07-13 DIAGNOSIS — E87.6 HYPOKALEMIA: ICD-10-CM

## 2023-07-13 DIAGNOSIS — I10 ESSENTIAL HYPERTENSION: ICD-10-CM

## 2023-07-13 RX ORDER — POTASSIUM CHLORIDE 1500 MG/1
20 TABLET, EXTENDED RELEASE ORAL DAILY
Qty: 90 TABLET | Refills: 3 | Status: SHIPPED | OUTPATIENT
Start: 2023-07-13 | End: 2024-06-25

## 2023-07-13 RX ORDER — CARVEDILOL 25 MG/1
50 TABLET ORAL 2 TIMES DAILY WITH MEALS
Qty: 90 TABLET | Refills: 3 | Status: SHIPPED | OUTPATIENT
Start: 2023-07-13 | End: 2023-08-16

## 2023-07-13 RX ORDER — FUROSEMIDE 20 MG
20 TABLET ORAL DAILY
Qty: 90 TABLET | Refills: 3 | Status: SHIPPED | OUTPATIENT
Start: 2023-07-13 | End: 2024-06-26

## 2023-07-13 NOTE — TELEPHONE ENCOUNTER
Medication Question or Refill    Contacts       Type Contact Phone/Fax    07/13/2023 02:57 PM CDT Phone (Incoming) Lesvia with [facility] (Home Care) [phone]          What medication are you calling about (include dose and sig)?:   - aspirin (ASA) 81 MG EC tablet  - carvedilol (COREG) 25 MG tablet  - furosemide (LASIX) 20 MG tablet  - potassium chloride ER (K-TAB) 20 MEQ CR tablet    Preferred Pharmacy:   [facility]/pharmacy #[id number] [address] [address]  Phone: [phone] Fax: [phone]      Controlled Substance Agreement on file:   CSA -- Patient Level:    CSA: None found at the patient level.       Who prescribed the medication?: Keegan Roach, Deisy Zimmerman.    Do you need a refill? Yes    When did you use the medication last? All out of Medications.    Patient offered an appointment? No    Do you have any questions or concerns?  No, Please call Lesvia at [facility] to confirm if prescription can be refill.      Could we send this information to you in [employer] or would you prefer to receive a phone call?:   Patient would prefer a phone call   Okay to leave a detailed message?: Yes at Other phone number:  [phone]

## 2023-07-13 NOTE — TELEPHONE ENCOUNTER
"Last Written Prescription Date:  6/22/23  Last Fill Quantity: 56,  # refills: 0   Last office visit provider:  7/5/23    Requested Prescriptions   Pending Prescriptions Disp Refills    carvedilol (COREG) 25 MG tablet 56 tablet 0     Sig: Take 2 tablets (50 mg) by mouth 2 times daily (with meals)       Beta-Blockers Protocol Passed - 7/13/2023  3:03 PM        Passed - Blood pressure under 140/90 in past 12 months     BP Readings from Last 3 Encounters:   07/05/23 97/66 06/13/23 130/76   06/12/23 130/76                 Passed - Patient is age 6 or older        Passed - Recent (12 mo) or future (30 days) visit within the authorizing provider's specialty     Patient has had an office visit with the authorizing provider or a provider within the authorizing providers department within the previous 12 mos or has a future within next 30 days. See \"Patient Info\" tab in inbasket, or \"Choose Columns\" in Meds & Orders section of the refill encounter.              Passed - Medication is active on med list      Last Written Prescription Date:  6/22/23  Last Fill Quantity: 14,  # refills: 0   Last office visit provider:  7/5/23      furosemide (LASIX) 20 MG tablet 14 tablet 0     Sig: Take 1 tablet (20 mg) by mouth daily       Diuretics (Including Combos) Protocol Passed - 7/13/2023  3:03 PM        Passed - Blood pressure under 140/90 in past 12 months     BP Readings from Last 3 Encounters:   07/05/23 97/66 06/13/23 130/76   06/12/23 130/76                 Passed - Recent (12 mo) or future (30 days) visit within the authorizing provider's specialty     Patient has had an office visit with the authorizing provider or a provider within the authorizing providers department within the previous 12 mos or has a future within next 30 days. See \"Patient Info\" tab in inbasket, or \"Choose Columns\" in Meds & Orders section of the refill encounter.              Passed - Medication is active on med list        Passed - Patient is age 18 " "or older        Passed - Normal serum creatinine on file in past 12 months     Recent Labs   Lab Test 07/05/23  1059   CR 0.95              Passed - Normal serum potassium on file in past 12 months     Recent Labs   Lab Test 07/05/23  1059   POTASSIUM 4.7                    Passed - Normal serum sodium on file in past 12 months     Recent Labs   Lab Test 07/05/23  1059               Last Written Prescription Date:  6/22/23  Last Fill Quantity: 14,  # refills: 0   Last office visit provider:  7/5/23      potassium chloride ER (K-TAB) 20 MEQ CR tablet 14 tablet 0     Sig: Take 1 tablet (20 mEq) by mouth daily       Potassium Supplements Protocol Passed - 7/13/2023  3:03 PM        Passed - Recent (12 mo) or future (30 days) visit within the authorizing provider's department     Patient has had an office visit with the authorizing provider or a provider within the authorizing providers department within the previous 12 mos or has a future within next 30 days. See \"Patient Info\" tab in inbasket, or \"Choose Columns\" in Meds & Orders section of the refill encounter.              Passed - Medication is active on med list        Passed - Patient is age 18 or older        Passed - Normal serum potassium in past 12 months     Recent Labs   Lab Test 07/05/23  1059   POTASSIUM 4.7                         Елена Paige RN 07/13/23 3:06 PM  "

## 2023-07-17 ENCOUNTER — TELEPHONE (OUTPATIENT)
Dept: FAMILY MEDICINE | Facility: CLINIC | Age: 78
End: 2023-07-17
Payer: MEDICARE

## 2023-07-17 DIAGNOSIS — E11.29 TYPE 2 DIABETES MELLITUS WITH MICROALBUMINURIA, WITHOUT LONG-TERM CURRENT USE OF INSULIN (H): ICD-10-CM

## 2023-07-17 DIAGNOSIS — R80.9 TYPE 2 DIABETES MELLITUS WITH MICROALBUMINURIA, WITHOUT LONG-TERM CURRENT USE OF INSULIN (H): ICD-10-CM

## 2023-07-17 NOTE — TELEPHONE ENCOUNTER
Medication Question or Refill    Contacts       Type Contact Phone/Fax    07/17/2023 12:20 PM CDT Phone (Incoming) [facility] (Home Care) [phone]          What medication are you calling about (include dose and sig)?:CONTOUR NEXT TEST test strip     Preferred Pharmacy:  [facility]/pharmacy #[id number] - [place], [place] - [address] [facility]  [address] [place] [address]  Phone: [phone] Fax: [phone]      Controlled Substance Agreement on file:   CSA -- Patient Level:    CSA: None found at the patient level.       Who prescribed the medication?: Pelzel    Do you need a refill? No    When did you use the medication last? N/A    Patient offered an appointment? No    Do you have any questions or concerns?  Yes: patient's home health nurse called stating he never received the test strips for him to use and that she called the pharmacy and they never received the Rx for them either. Caller asked if Rx could be sent again. Please advise      Could we send this information to you in [employer] or would you prefer to receive a phone call?:   Patient would prefer a phone call   Okay to leave a detailed message?: Yes at Home number on file [phone] (home)

## 2023-07-18 DIAGNOSIS — E11.29 TYPE 2 DIABETES MELLITUS WITH MICROALBUMINURIA, WITHOUT LONG-TERM CURRENT USE OF INSULIN (H): ICD-10-CM

## 2023-07-18 DIAGNOSIS — R80.9 TYPE 2 DIABETES MELLITUS WITH MICROALBUMINURIA, WITHOUT LONG-TERM CURRENT USE OF INSULIN (H): ICD-10-CM

## 2023-07-19 RX ORDER — ASPIRIN 81 MG/1
81 TABLET ORAL DAILY
Qty: 90 TABLET | Refills: 1 | Status: SHIPPED | OUTPATIENT
Start: 2023-07-19 | End: 2024-01-15

## 2023-07-19 NOTE — TELEPHONE ENCOUNTER
"Routing refill request to provider for review/approval because:  A break in medication    Last Written Prescription Date:  9/21/2021  Last Fill Quantity: 90,  # refills: 3   Last office visit provider:  7/5/2023     Requested Prescriptions   Pending Prescriptions Disp Refills     aspirin 81 MG EC tablet       Sig: Take 1 tablet (81 mg) by mouth daily       Analgesics (Non-Narcotic Tylenol and ASA Only) Passed - 7/18/2023  1:47 PM        Passed - Recent (12 mo) or future (30 days) visit within the authorizing provider's specialty     Patient has had an office visit with the authorizing provider or a provider within the authorizing providers department within the previous 12 mos or has a future within next 30 days. See \"Patient Info\" tab in inbasket, or \"Choose Columns\" in Meds & Orders section of the refill encounter.              Passed - Patient is age 20 years or older     If ASA is flagged for ages under 20 years old. Forward to provider for confirmation Ryes Syndrome is not a concern.              Passed - Medication is active on med list             Serenity Bruno RN 07/19/23 1:44 AM  "

## 2023-07-19 NOTE — PROGRESS NOTES
Clinic Care Coordination Contact    CC SANDRA called and followed up with pt after being discharged from [facility]CU. SW completed the discharge assessment and checked if pt had everything he needed at home after his discharge. Pt reported that he was supposed to be starting PT/OT while home but has not had anyone come out to do his therapy yet. Pt did report that someone came out to talk with him from homecare but that they didn't do his therapy when they were out there. SW explained to pt that someone comes out to his home to do some assessments and once those are completed and they get the information needed that his therapy would start soon.    Catina Bynum, [facility]   Social Work Care Coordinator   [facility]    [phone]

## 2023-07-20 NOTE — TELEPHONE ENCOUNTER
Lesvia, home care RN, called back.   Patient has still been unable to get his test strips.  Per pharmacy, insurance will not cover the amount of test strips needed for 2-3 uses per day because patient is not insulin dependent.   They either need a prior auth, or need the rx to be adjusted for 1 use per day to comply with insurance requirements.

## 2023-07-24 ENCOUNTER — MEDICAL CORRESPONDENCE (OUTPATIENT)
Dept: HEALTH INFORMATION MANAGEMENT | Facility: CLINIC | Age: 78
End: 2023-07-24
Payer: MEDICARE

## 2023-07-24 DIAGNOSIS — R80.9 TYPE 2 DIABETES MELLITUS WITH MICROALBUMINURIA, WITHOUT LONG-TERM CURRENT USE OF INSULIN (H): ICD-10-CM

## 2023-07-24 DIAGNOSIS — E11.29 TYPE 2 DIABETES MELLITUS WITH MICROALBUMINURIA, WITHOUT LONG-TERM CURRENT USE OF INSULIN (H): ICD-10-CM

## 2023-07-24 DIAGNOSIS — N18.31 CHRONIC KIDNEY DISEASE, STAGE 3A (H): ICD-10-CM

## 2023-07-24 RX ORDER — CANAGLIFLOZIN 100 MG/1
TABLET, FILM COATED ORAL
Qty: 14 TABLET | Refills: 0 | OUTPATIENT
Start: 2023-07-24

## 2023-07-26 ENCOUNTER — MEDICAL CORRESPONDENCE (OUTPATIENT)
Dept: HEALTH INFORMATION MANAGEMENT | Facility: CLINIC | Age: 78
End: 2023-07-26
Payer: MEDICARE

## 2023-07-31 DIAGNOSIS — E11.29 TYPE 2 DIABETES MELLITUS WITH MICROALBUMINURIA, WITHOUT LONG-TERM CURRENT USE OF INSULIN (H): ICD-10-CM

## 2023-07-31 DIAGNOSIS — N18.31 CHRONIC KIDNEY DISEASE, STAGE 3A (H): ICD-10-CM

## 2023-07-31 DIAGNOSIS — R80.9 TYPE 2 DIABETES MELLITUS WITH MICROALBUMINURIA, WITHOUT LONG-TERM CURRENT USE OF INSULIN (H): ICD-10-CM

## 2023-07-31 RX ORDER — CANAGLIFLOZIN 100 MG/1
TABLET, FILM COATED ORAL
Qty: 14 TABLET | Refills: 0 | OUTPATIENT
Start: 2023-07-31

## 2023-08-01 ENCOUNTER — TELEPHONE (OUTPATIENT)
Dept: FAMILY MEDICINE | Facility: CLINIC | Age: 78
End: 2023-08-01
Payer: MEDICARE

## 2023-08-01 NOTE — TELEPHONE ENCOUNTER
Pt's home care nurse is calling back to follow up on refill request for med as indicated below. Per home care nurse, pt is completely out of med and in need of med. Please order medication if applicable or follow back up with pt's home care nurse Lesvia hollins#[phone] if unable to order. Thank you.

## 2023-08-01 NOTE — CONFIDENTIAL NOTE
Lesvia informed of message, will contact the family about getting the pt in sooner if need be then the 8/16 appt that is scheduled.

## 2023-08-01 NOTE — TELEPHONE ENCOUNTER
Order/Referral Request    Who is requesting: Patient    Orders being requested: Referral for Pain Clinic     Reason service is needed/diagnosis: Per pt's home care nurse Lesvia, pt is requesting referral for chronic legs and arms pain.     When are orders needed by: At your earliest convenience     Has this been discussed with Provider: No    Does patient have a preference on a Group/Provider/Facility? na    Does patient have an appointment scheduled?: No    Where to send orders: N/A    Could we send this information to you in [facility] or would you prefer to receive a phone call?:   Patient would prefer a phone call   Okay to leave a detailed message?: Yes at Other phone number:  [phone], home care nurse Lesvia

## 2023-08-01 NOTE — TELEPHONE ENCOUNTER
I am not opposed to that, but we had a plan to consider transition to Lyrica if he did not have a response to gabapentin dose changes.    Think he should see us in primary clinic first.

## 2023-08-07 DIAGNOSIS — N18.31 CHRONIC KIDNEY DISEASE, STAGE 3A (H): ICD-10-CM

## 2023-08-07 DIAGNOSIS — R80.9 TYPE 2 DIABETES MELLITUS WITH MICROALBUMINURIA, WITHOUT LONG-TERM CURRENT USE OF INSULIN (H): ICD-10-CM

## 2023-08-07 DIAGNOSIS — E87.1 HYPONATREMIA: ICD-10-CM

## 2023-08-07 DIAGNOSIS — E11.29 TYPE 2 DIABETES MELLITUS WITH MICROALBUMINURIA, WITHOUT LONG-TERM CURRENT USE OF INSULIN (H): ICD-10-CM

## 2023-08-08 ENCOUNTER — MEDICAL CORRESPONDENCE (OUTPATIENT)
Dept: HEALTH INFORMATION MANAGEMENT | Facility: CLINIC | Age: 78
End: 2023-08-08
Payer: MEDICARE

## 2023-08-08 DIAGNOSIS — E11.29 TYPE 2 DIABETES MELLITUS WITH MICROALBUMINURIA, WITHOUT LONG-TERM CURRENT USE OF INSULIN (H): ICD-10-CM

## 2023-08-08 DIAGNOSIS — M54.50 ACUTE MIDLINE LOW BACK PAIN WITHOUT SCIATICA: ICD-10-CM

## 2023-08-08 DIAGNOSIS — N18.31 CHRONIC KIDNEY DISEASE, STAGE 3A (H): ICD-10-CM

## 2023-08-08 DIAGNOSIS — R80.9 TYPE 2 DIABETES MELLITUS WITH MICROALBUMINURIA, WITHOUT LONG-TERM CURRENT USE OF INSULIN (H): ICD-10-CM

## 2023-08-08 DIAGNOSIS — G62.9 POLYNEUROPATHY: ICD-10-CM

## 2023-08-08 RX ORDER — GABAPENTIN 100 MG/1
200 CAPSULE ORAL AT BEDTIME
Qty: 30 CAPSULE | Refills: 0 | Status: SHIPPED | OUTPATIENT
Start: 2023-08-08 | End: 2023-08-16

## 2023-08-08 RX ORDER — AMOXICILLIN 250 MG
1 CAPSULE ORAL 2 TIMES DAILY
Qty: 60 TABLET | Refills: 0 | Status: SHIPPED | OUTPATIENT
Start: 2023-08-08 | End: 2023-09-07

## 2023-08-08 RX ORDER — CARVEDILOL 25 MG/1
TABLET, FILM COATED ORAL
Qty: 100 STRIP | Refills: 3 | Status: SHIPPED | OUTPATIENT
Start: 2023-08-08 | End: 2023-08-16

## 2023-08-08 RX ORDER — SODIUM CHLORIDE 1 G/1
2 TABLET ORAL DAILY
Qty: 60 TABLET | Refills: 0 | Status: SHIPPED | OUTPATIENT
Start: 2023-08-08 | End: 2023-10-04

## 2023-08-08 RX ORDER — CANAGLIFLOZIN 100 MG/1
100 TABLET, FILM COATED ORAL
Qty: 14 TABLET | Refills: 0 | OUTPATIENT
Start: 2023-08-08

## 2023-08-08 NOTE — TELEPHONE ENCOUNTER
Medication Question or Refill    Contacts         Type Contact Phone/Fax    08/08/2023 04:51 PM CDT Phone (Incoming) Vashti King (Self) [phone] (H)            What medication are you calling about (include dose and sig)?: canagliflozin (INVOKANA) 100 MG tablet, senna-docusate (SENOKOT-S/PERICOLACE) 8.6-50 MG tablet ,  gabapentin (NEURONTIN) 100 MG capsule, blood glucose (CONTOUR TEST) test strip     Preferred Pharmacy:[id number] - [place], [place] - [address]  [address]  [facility] [address]  Phone: [phone] Fax: [phone]          Controlled Substance Agreement on file:   CSA -- Patient Level:    CSA: None found at the patient level.       Who prescribed the medication?: Lela Allison    Do you need a refill? Yes    When did you use the medication last? Last week    Patient offered an appointment? No    Do you have any questions or concerns?  Yes: patient has been completely out of medication for over a week. Request have been put in with no update for patient. The home health nurse is concerned that he isn't receiving his medication. Also stated that provider was to be sending over another pain medication instead of gabapentin but they have not received it at the pharmacy so now requesting gabapentin. And patient needs more test strips for his contour machine since getting a new one, but the pharmacy states that there will need to be a prior authorization sent for that.       Could we send this information to you in [facility] or would you prefer to receive a phone call?:   Patient would prefer a phone call   Okay to leave a detailed message?: Yes at Other phone number:  [facility] [phone]

## 2023-08-14 DIAGNOSIS — E11.9 TYPE 2 DIABETES MELLITUS WITHOUT COMPLICATION (H): ICD-10-CM

## 2023-08-14 DIAGNOSIS — M54.50 ACUTE MIDLINE LOW BACK PAIN WITHOUT SCIATICA: ICD-10-CM

## 2023-08-16 ENCOUNTER — OFFICE VISIT (OUTPATIENT)
Dept: FAMILY MEDICINE | Facility: CLINIC | Age: 78
End: 2023-08-16
Payer: MEDICARE

## 2023-08-16 VITALS
SYSTOLIC BLOOD PRESSURE: 82 MMHG | OXYGEN SATURATION: 96 % | DIASTOLIC BLOOD PRESSURE: 54 MMHG | WEIGHT: 156 LBS | RESPIRATION RATE: 18 BRPM | HEART RATE: 68 BPM | HEIGHT: 65 IN | TEMPERATURE: 97.6 F | BODY MASS INDEX: 25.99 KG/M2

## 2023-08-16 DIAGNOSIS — I10 ESSENTIAL HYPERTENSION: ICD-10-CM

## 2023-08-16 DIAGNOSIS — E11.29 TYPE 2 DIABETES MELLITUS WITH MICROALBUMINURIA, WITHOUT LONG-TERM CURRENT USE OF INSULIN (H): ICD-10-CM

## 2023-08-16 DIAGNOSIS — G62.9 POLYNEUROPATHY: ICD-10-CM

## 2023-08-16 DIAGNOSIS — E11.40 TYPE 2 DIABETES MELLITUS WITH DIABETIC NEUROPATHY, WITHOUT LONG-TERM CURRENT USE OF INSULIN (H): Primary | ICD-10-CM

## 2023-08-16 DIAGNOSIS — R80.9 TYPE 2 DIABETES MELLITUS WITH MICROALBUMINURIA, WITHOUT LONG-TERM CURRENT USE OF INSULIN (H): ICD-10-CM

## 2023-08-16 PROCEDURE — 99207 PR FOOT EXAM NO CHARGE: CPT | Performed by: FAMILY MEDICINE

## 2023-08-16 PROCEDURE — 99214 OFFICE O/P EST MOD 30 MIN: CPT | Performed by: FAMILY MEDICINE

## 2023-08-16 RX ORDER — ACETAMINOPHEN 325 MG/1
325-650 TABLET ORAL AT BEDTIME
Qty: 180 TABLET | Refills: 0 | Status: SHIPPED | OUTPATIENT
Start: 2023-08-16 | End: 2023-09-12

## 2023-08-16 RX ORDER — TRAMADOL HYDROCHLORIDE 50 MG/1
50 TABLET ORAL 2 TIMES DAILY PRN
Qty: 16 TABLET | Refills: 1 | OUTPATIENT
Start: 2023-08-16

## 2023-08-16 RX ORDER — METFORMIN HCL 500 MG
1000 TABLET, EXTENDED RELEASE 24 HR ORAL 2 TIMES DAILY
Qty: 56 TABLET | Refills: 0 | Status: SHIPPED | OUTPATIENT
Start: 2023-08-16 | End: 2023-09-07

## 2023-08-16 RX ORDER — GABAPENTIN 100 MG/1
CAPSULE ORAL
Qty: 30 CAPSULE | Refills: 0 | Status: SHIPPED | OUTPATIENT
Start: 2023-08-16 | End: 2023-09-07

## 2023-08-16 RX ORDER — CARVEDILOL 25 MG/1
25 TABLET ORAL 2 TIMES DAILY WITH MEALS
Qty: 90 TABLET | Refills: 3 | Status: SHIPPED | OUTPATIENT
Start: 2023-08-16 | End: 2023-09-22

## 2023-08-16 RX ORDER — CARVEDILOL 25 MG/1
TABLET, FILM COATED ORAL
Qty: 100 STRIP | Refills: 3 | Status: SHIPPED | OUTPATIENT
Start: 2023-08-16 | End: 2024-09-15

## 2023-08-16 RX ORDER — MULTIVITAMIN
1 TABLET ORAL DAILY
Qty: 90 TABLET | Refills: 4 | Status: SHIPPED | OUTPATIENT
Start: 2023-08-16

## 2023-08-16 NOTE — PROGRESS NOTES
"  Assessment & Plan     Type 2 diabetes mellitus with diabetic neuropathy, without long-term current use of insulin (H)  Type 2 diabetes mellitus with microalbuminuria, without long-term current use of insulin (H)  Has reasonable control.  No need to recheck right now.  May be causing polyneuropathy.  See discussion below.  - FOOT EXAM  - REVIEW OF HEALTH MAINTENANCE PROTOCOL ORDERS  - blood glucose (CONTOUR TEST) test strip  Dispense: 100 strip; Refill: 3  - PRIMARY CARE FOLLOW-UP SCHEDULING  - multivitamin (ONE-DAILY) tablet  Dispense: 90 tablet; Refill: 4    Polyneuropathy  Restart Tylenol.  Can use that as needed.  Add gabapentin and increase dose.  Can go to 300 mg nightly.  During the day at 2 different times could take 200 mg.  - acetaminophen (TYLENOL) 325 MG tablet  Dispense: 180 tablet; Refill: 0  - gabapentin (NEURONTIN) 100 MG capsule  Dispense: 30 capsule; Refill: 0    Essential hypertension  Currently hypotensive but seemingly asymptomatic.  Recommended decreasing carvedilol.  Previous dose was 50 mg twice daily.  We will reduce that to 25 mg twice per day.  If they can continue to monitor pressures might be able to reduce this even more.  Concurrently on  250 mg diltiazem XR  Furosemide 20  Lisinopril 20  - carvedilol (COREG) 25 MG tablet  Dispense: 90 tablet; Refill: 3      This visit was conducted with the aid of a professional .     Has follow-up with me scheduled for October.       BMI:   Estimated body mass index is 25.96 kg/m  as calculated from the following:    Height as of this encounter: 1.651 m (5' 5\").    Weight as of this encounter: 70.8 kg (156 lb).     Edison Banda MD  [facility]    Moris Kingston is a 77 year old, presenting for the following health issues:  Follow Up (Follow up Nephrology  and concern that is not being well controlled and meds didn't seem to help him with the pain. )        8/16/2023     8:52 AM   Additional " "Questions   Roomed by hser   Accompanied by son       History of Present Illness       Reason for visit:  Follow up    He eats 2-3 servings of fruits and vegetables daily.He consumes 0 sweetened beverage(s) daily.He exercises with enough effort to increase his heart rate 30 to 60 minutes per day.  He exercises with enough effort to increase his heart rate 7 days per week.   He is taking medications regularly.       Here to follow-up.  Blood pressure still low.  Seems asymptomatic.  Discussed medication changes.    Still having some difficulty with pain control.  Not taking Tylenol regularly.  When he does, he has pain relief.  Sleeps better.  Gabapentin restarted at low-dose.    Denies excessive thirst or urination.    Here with son.      Objective    BP (!) 82/54 (BP Location: Left arm, Patient Position: Sitting, Cuff Size: Adult Regular)   Pulse 68   Temp 97.6  F (36.4  C) (Temporal)   Resp 18   Ht 1.651 m (5' 5\")   Wt 70.8 kg (156 lb)   SpO2 96%   BMI 25.96 kg/m    Body mass index is 25.96 kg/m .  Physical Exam   GENERAL: alert, not distressed  CHEST: clear, no rales, rhonchi, or wheezes  CARDIAC: regular without murmur, gallop, or rub  ABDOMEN: soft, non tender, non distended, normal bowel sounds      No results found for any visits on 08/16/23.        Prior to immunization administration, verified patients identity using patient s name and date of birth. Please see Immunization Activity for additional information.     Screening Questionnaire for Adult Immunization    Are you sick today?   No   Do you have allergies to medications, food, a vaccine component or latex?   No   Have you ever had a serious reaction after receiving a vaccination?   No   Do you have a long-term health problem with heart, lung, kidney, or metabolic disease (e.g., diabetes), asthma, a blood disorder, no spleen, complement component deficiency, a cochlear implant, or a spinal fluid leak?  Are you on long-term aspirin therapy?   Yes "   Do you have cancer, leukemia, HIV/AIDS, or any other immune system problem?   No   Do you have a parent, brother, or sister with an immune system problem?   No   In the past 3 months, have you taken medications that affect  your immune system, such as prednisone, other steroids, or anticancer drugs; drugs for the treatment of rheumatoid arthritis, Crohn s disease, or psoriasis; or have you had radiation treatments?   Don't Know   Have you had a seizure, or a brain or other nervous system problem?   No   During the past year, have you received a transfusion of blood or blood    products, or been given immune (gamma) globulin or antiviral drug?   No   For women: Are you pregnant or is there a chance you could become       pregnant during the next month?   No   Have you received any vaccinations in the past 4 weeks?   No     Immunization questionnaire was positive for at least one answer.  Notified provider.      Patient instructed to remain in clinic for 15 minutes afterwards, and to report any adverse reactions.     Screening performed by Demetri Thompson MA on 8/16/2023 at 8:59 AM.

## 2023-09-06 DIAGNOSIS — M54.50 ACUTE MIDLINE LOW BACK PAIN WITHOUT SCIATICA: ICD-10-CM

## 2023-09-06 DIAGNOSIS — G62.9 POLYNEUROPATHY: ICD-10-CM

## 2023-09-06 DIAGNOSIS — E11.9 TYPE 2 DIABETES MELLITUS WITHOUT COMPLICATION (H): ICD-10-CM

## 2023-09-07 RX ORDER — METFORMIN HCL 500 MG
1000 TABLET, EXTENDED RELEASE 24 HR ORAL 2 TIMES DAILY
Qty: 360 TABLET | Refills: 0 | Status: SHIPPED | OUTPATIENT
Start: 2023-09-07 | End: 2023-12-06

## 2023-09-07 RX ORDER — GABAPENTIN 100 MG/1
CAPSULE ORAL
Qty: 30 CAPSULE | Refills: 0 | Status: SHIPPED | OUTPATIENT
Start: 2023-09-07 | End: 2023-09-12

## 2023-09-07 RX ORDER — DOCUSATE SODIUM 50MG AND SENNOSIDES 8.6MG 8.6; 5 MG/1; MG/1
TABLET, FILM COATED ORAL
Qty: 60 TABLET | Refills: 0 | Status: SHIPPED | OUTPATIENT
Start: 2023-09-07 | End: 2023-10-03

## 2023-09-07 NOTE — TELEPHONE ENCOUNTER
Senexon Prescription approved per [facility] Refill Protocol.  Camilla WOODS RN  [facility]

## 2023-09-08 DIAGNOSIS — G62.9 POLYNEUROPATHY: ICD-10-CM

## 2023-09-08 DIAGNOSIS — E11.29 TYPE 2 DIABETES MELLITUS WITH MICROALBUMINURIA, WITHOUT LONG-TERM CURRENT USE OF INSULIN (H): ICD-10-CM

## 2023-09-08 DIAGNOSIS — I10 ESSENTIAL HYPERTENSION: ICD-10-CM

## 2023-09-08 DIAGNOSIS — M54.50 ACUTE MIDLINE LOW BACK PAIN WITHOUT SCIATICA: ICD-10-CM

## 2023-09-08 DIAGNOSIS — N18.31 CHRONIC KIDNEY DISEASE, STAGE 3A (H): ICD-10-CM

## 2023-09-08 DIAGNOSIS — R80.9 TYPE 2 DIABETES MELLITUS WITH MICROALBUMINURIA, WITHOUT LONG-TERM CURRENT USE OF INSULIN (H): ICD-10-CM

## 2023-09-12 ENCOUNTER — TELEPHONE (OUTPATIENT)
Dept: FAMILY MEDICINE | Facility: CLINIC | Age: 78
End: 2023-09-12
Payer: MEDICARE

## 2023-09-12 DIAGNOSIS — G62.9 POLYNEUROPATHY: ICD-10-CM

## 2023-09-12 RX ORDER — CANAGLIFLOZIN 100 MG/1
TABLET, FILM COATED ORAL
Qty: 90 TABLET | Refills: 0 | OUTPATIENT
Start: 2023-09-12

## 2023-09-12 RX ORDER — GLIPIZIDE 5 MG/1
10 TABLET ORAL 2 TIMES DAILY WITH MEALS
Qty: 360 TABLET | Refills: 0 | Status: SHIPPED | OUTPATIENT
Start: 2023-09-12 | End: 2023-12-05

## 2023-09-12 RX ORDER — GABAPENTIN 100 MG/1
200 CAPSULE ORAL AT BEDTIME
Qty: 30 CAPSULE | Refills: 0 | OUTPATIENT
Start: 2023-09-12

## 2023-09-12 RX ORDER — LISINOPRIL 20 MG/1
20 TABLET ORAL DAILY
Qty: 90 TABLET | Refills: 0 | Status: SHIPPED | OUTPATIENT
Start: 2023-09-12 | End: 2023-12-05

## 2023-09-12 RX ORDER — ACETAMINOPHEN 325 MG/1
TABLET ORAL
Qty: 180 TABLET | Refills: 0 | Status: SHIPPED | OUTPATIENT
Start: 2023-09-12 | End: 2023-10-19

## 2023-09-12 RX ORDER — DOCUSATE SODIUM 50MG AND SENNOSIDES 8.6MG 8.6; 5 MG/1; MG/1
1 TABLET, FILM COATED ORAL 2 TIMES DAILY
Qty: 60 TABLET | Refills: 0 | OUTPATIENT
Start: 2023-09-12

## 2023-09-13 RX ORDER — DILTIAZEM HYDROCHLORIDE 240 MG/1
CAPSULE, COATED, EXTENDED RELEASE ORAL
Qty: 90 CAPSULE | Refills: 0 | Status: SHIPPED | OUTPATIENT
Start: 2023-09-13 | End: 2023-12-04

## 2023-09-13 RX ORDER — GABAPENTIN 100 MG/1
CAPSULE ORAL
Qty: 450 CAPSULE | Refills: 0 | Status: SHIPPED | OUTPATIENT
Start: 2023-09-13 | End: 2023-12-06

## 2023-09-14 ENCOUNTER — TELEPHONE (OUTPATIENT)
Dept: FAMILY MEDICINE | Facility: CLINIC | Age: 78
End: 2023-09-14
Payer: MEDICARE

## 2023-09-14 NOTE — TELEPHONE ENCOUNTER
Reason for Call:  Check on status of form sent     Detailed comments: Orders for home care for order numbers: [id number], [id number],   It appeared provider signed but not fax out.  Clinic RN printed forms above and faxed  F:[phone] attn:  Lyudmila  P: [phone]    FLAVIO Paris, RN  [facility]

## 2023-09-19 NOTE — TELEPHONE ENCOUNTER
Lyudmila from hospice is calling back to get the forms faxed over ASAP, please fax it ASAP to Lyudmila. Thanks!

## 2023-09-22 DIAGNOSIS — I10 ESSENTIAL HYPERTENSION: ICD-10-CM

## 2023-09-22 RX ORDER — CARVEDILOL 25 MG/1
25 TABLET ORAL 2 TIMES DAILY WITH MEALS
Qty: 180 TABLET | Refills: 0 | Status: SHIPPED | OUTPATIENT
Start: 2023-09-22 | End: 2024-03-04

## 2023-10-01 DIAGNOSIS — M54.50 ACUTE MIDLINE LOW BACK PAIN WITHOUT SCIATICA: ICD-10-CM

## 2023-10-01 DIAGNOSIS — E87.1 HYPONATREMIA: ICD-10-CM

## 2023-10-03 RX ORDER — DOCUSATE SODIUM 50MG AND SENNOSIDES 8.6MG 8.6; 5 MG/1; MG/1
1 TABLET, FILM COATED ORAL 2 TIMES DAILY
Qty: 60 TABLET | Refills: 0 | Status: SHIPPED | OUTPATIENT
Start: 2023-10-03 | End: 2023-11-02

## 2023-10-04 RX ORDER — SODIUM CHLORIDE 1 G/1
2 TABLET ORAL DAILY
Qty: 60 TABLET | Refills: 0 | Status: SHIPPED | OUTPATIENT
Start: 2023-10-04 | End: 2023-12-06

## 2023-10-16 ENCOUNTER — OFFICE VISIT (OUTPATIENT)
Dept: FAMILY MEDICINE | Facility: CLINIC | Age: 78
End: 2023-10-16
Payer: MEDICARE

## 2023-10-16 VITALS
RESPIRATION RATE: 16 BRPM | WEIGHT: 160 LBS | DIASTOLIC BLOOD PRESSURE: 76 MMHG | OXYGEN SATURATION: 99 % | HEIGHT: 64 IN | SYSTOLIC BLOOD PRESSURE: 127 MMHG | HEART RATE: 53 BPM | BODY MASS INDEX: 27.31 KG/M2 | TEMPERATURE: 97.9 F

## 2023-10-16 DIAGNOSIS — R80.9 TYPE 2 DIABETES MELLITUS WITH MICROALBUMINURIA, WITHOUT LONG-TERM CURRENT USE OF INSULIN (H): Primary | ICD-10-CM

## 2023-10-16 DIAGNOSIS — R80.9 MICROALBUMINURIA: ICD-10-CM

## 2023-10-16 DIAGNOSIS — E11.29 TYPE 2 DIABETES MELLITUS WITH MICROALBUMINURIA, WITHOUT LONG-TERM CURRENT USE OF INSULIN (H): Primary | ICD-10-CM

## 2023-10-16 LAB
ALBUMIN SERPL BCG-MCNC: 4.6 G/DL (ref 3.5–5.2)
ANION GAP SERPL CALCULATED.3IONS-SCNC: 11 MMOL/L (ref 7–15)
BUN SERPL-MCNC: 14.8 MG/DL (ref 8–23)
CALCIUM SERPL-MCNC: 10.1 MG/DL (ref 8.8–10.2)
CHLORIDE SERPL-SCNC: 104 MMOL/L (ref 98–107)
CHOLEST SERPL-MCNC: 111 MG/DL
CREAT SERPL-MCNC: 0.92 MG/DL (ref 0.67–1.17)
DEPRECATED HCO3 PLAS-SCNC: 26 MMOL/L (ref 22–29)
EGFRCR SERPLBLD CKD-EPI 2021: 87 ML/MIN/1.73M2
ERYTHROCYTE [DISTWIDTH] IN BLOOD BY AUTOMATED COUNT: 12.8 % (ref 10–15)
GLUCOSE SERPL-MCNC: 106 MG/DL (ref 70–99)
HBA1C MFR BLD: 6.3 % (ref 0–5.6)
HCT VFR BLD AUTO: 42.8 % (ref 40–53)
HDLC SERPL-MCNC: 50 MG/DL
HGB BLD-MCNC: 14 G/DL (ref 13.3–17.7)
LDLC SERPL CALC-MCNC: 48 MG/DL
MCH RBC QN AUTO: 30.6 PG (ref 26.5–33)
MCHC RBC AUTO-ENTMCNC: 32.7 G/DL (ref 31.5–36.5)
MCV RBC AUTO: 94 FL (ref 78–100)
NONHDLC SERPL-MCNC: 61 MG/DL
PHOSPHATE SERPL-MCNC: 4.1 MG/DL (ref 2.5–4.5)
PLATELET # BLD AUTO: 222 10E3/UL (ref 150–450)
POTASSIUM SERPL-SCNC: 4.5 MMOL/L (ref 3.4–5.3)
RBC # BLD AUTO: 4.57 10E6/UL (ref 4.4–5.9)
SODIUM SERPL-SCNC: 141 MMOL/L (ref 135–145)
TRIGL SERPL-MCNC: 63 MG/DL
TSH SERPL DL<=0.005 MIU/L-ACNC: 0.83 UIU/ML (ref 0.3–4.2)
WBC # BLD AUTO: 6 10E3/UL (ref 4–11)

## 2023-10-16 PROCEDURE — 85027 COMPLETE CBC AUTOMATED: CPT | Performed by: FAMILY MEDICINE

## 2023-10-16 PROCEDURE — 36415 COLL VENOUS BLD VENIPUNCTURE: CPT | Performed by: FAMILY MEDICINE

## 2023-10-16 PROCEDURE — 80061 LIPID PANEL: CPT | Performed by: FAMILY MEDICINE

## 2023-10-16 PROCEDURE — 90662 IIV NO PRSV INCREASED AG IM: CPT | Performed by: FAMILY MEDICINE

## 2023-10-16 PROCEDURE — 80069 RENAL FUNCTION PANEL: CPT | Performed by: FAMILY MEDICINE

## 2023-10-16 PROCEDURE — 83036 HEMOGLOBIN GLYCOSYLATED A1C: CPT | Performed by: FAMILY MEDICINE

## 2023-10-16 PROCEDURE — 91320 SARSCV2 VAC 30MCG TRS-SUC IM: CPT | Performed by: FAMILY MEDICINE

## 2023-10-16 PROCEDURE — 99214 OFFICE O/P EST MOD 30 MIN: CPT | Mod: 25 | Performed by: FAMILY MEDICINE

## 2023-10-16 PROCEDURE — 90480 ADMN SARSCOV2 VAC 1/ONLY CMP: CPT | Performed by: FAMILY MEDICINE

## 2023-10-16 PROCEDURE — 84443 ASSAY THYROID STIM HORMONE: CPT | Performed by: FAMILY MEDICINE

## 2023-10-16 PROCEDURE — 82607 VITAMIN B-12: CPT | Performed by: FAMILY MEDICINE

## 2023-10-16 PROCEDURE — 82570 ASSAY OF URINE CREATININE: CPT | Performed by: FAMILY MEDICINE

## 2023-10-16 PROCEDURE — 82043 UR ALBUMIN QUANTITATIVE: CPT | Performed by: FAMILY MEDICINE

## 2023-10-16 PROCEDURE — G0008 ADMIN INFLUENZA VIRUS VAC: HCPCS | Performed by: FAMILY MEDICINE

## 2023-10-16 RX ORDER — RESPIRATORY SYNCYTIAL VIRUS VACCINE 120MCG/0.5
0.5 KIT INTRAMUSCULAR ONCE
Qty: 1 EACH | Refills: 0 | Status: CANCELLED | OUTPATIENT
Start: 2023-10-16 | End: 2023-10-16

## 2023-10-16 NOTE — PROGRESS NOTES
"  Assessment & Plan     Type 2 diabetes mellitus with microalbuminuria, without long-term current use of insulin (H)  Microalbuminuria  Control looks excellent.  We will continue current medications including Invokana, glipizide, metformin  - Albumin Random Urine Quantitative with Creat Ratio  - Lipid panel reflex to direct LDL Non-fasting  - Renal panel (Alb, BUN, Ca, Cl, CO2, Creat, Gluc, Phos, K, Na)  - Hemoglobin A1c  - CBC with platelets  - Adult Eye  Referral  - Vitamin B12    History of hyponatremia.  Currently on salt tablets.  Would like to discontinue them if possible.  Rechecking renal panel today.       BMI:   Estimated body mass index is 27.46 kg/m  as calculated from the following:    Height as of this encounter: 1.626 m (5' 4\").    Weight as of this encounter: 72.6 kg (160 lb).       Edison Banda MD  [facility]    Moris Kingston is a 77 year old, presenting for the following health issues:  Follow Up        10/16/2023     4:03 PM   Additional Questions   Roomed by hser   Accompanied by dad       History of Present Illness       Diabetes:   He presents for follow up of diabetes.  He is checking home blood glucose one time daily.   He checks blood glucose before meals.  Blood glucose is never over 200 and never under 70. He is aware of hypoglycemia symptoms including none.    He has no concerns regarding his diabetes at this time.  He is having numbness in feet.  The patient has not had a diabetic eye exam in the last 12 months.          He eats 0-1 servings of fruits and vegetables daily.He consumes 0 sweetened beverage(s) daily.He exercises with enough effort to increase his heart rate 20 to 29 minutes per day.  He exercises with enough effort to increase his heart rate 4 days per week.   He is taking medications regularly.     Patient returns for follow-up companied by his son.  Follow-up on diabetes.  Still concerned about getting some side ache if he walks " "for a long time.  Thinks blood sugars have been 70s or 80s or 90s when fasting.  No history of hypoglycemia or symptoms to suggest that.  Feeling pretty well all in all.    Had some help with PT at home.  Reports that ended.  Still feels a little bit weak.  Wishes he could get a little more strength and asks for medicines to increase strength.  Review of Systems         Objective    /76 (BP Location: Right arm, Patient Position: Sitting, Cuff Size: Adult Regular)   Pulse 53   Temp 97.9  F (36.6  C) (Temporal)   Resp 16   Ht 1.626 m (5' 4\")   Wt 72.6 kg (160 lb)   SpO2 99%   BMI 27.46 kg/m    Body mass index is 27.46 kg/m .  Physical Exam   GENERAL: alert, not distressed  CHEST: clear, no rales, rhonchi, or wheezes  CARDIAC: regular without murmur, gallop, or rub  Les: Trace edema.  No calf tenderness.      Results for orders placed or performed in visit on 10/16/23 (from the past 24 hour(s))   Hemoglobin A1c   Result Value Ref Range    Hemoglobin A1C 6.3 (H) 0.0 - 5.6 %   CBC with platelets   Result Value Ref Range    WBC Count 6.0 4.0 - 11.0 10e3/uL    RBC Count 4.57 4.40 - 5.90 10e6/uL    Hemoglobin 14.0 13.3 - 17.7 g/dL    Hematocrit 42.8 40.0 - 53.0 %    MCV 94 78 - 100 fL    MCH 30.6 26.5 - 33.0 pg    MCHC 32.7 31.5 - 36.5 g/dL    RDW 12.8 10.0 - 15.0 %    Platelet Count 222 150 - 450 10e3/uL               Prior to immunization administration, verified patients identity using patient s name and date of birth. Please see Immunization Activity for additional information.     Screening Questionnaire for Adult Immunization    Are you sick today?   No   Do you have allergies to medications, food, a vaccine component or latex?   Yes   Have you ever had a serious reaction after receiving a vaccination?   No   Do you have a long-term health problem with heart, lung, kidney, or metabolic disease (e.g., diabetes), asthma, a blood disorder, no spleen, complement component deficiency, a cochlear implant, or a " spinal fluid leak?  Are you on long-term aspirin therapy?   Yes   Do you have cancer, leukemia, HIV/AIDS, or any other immune system problem?   No   Do you have a parent, brother, or sister with an immune system problem?   No   In the past 3 months, have you taken medications that affect  your immune system, such as prednisone, other steroids, or anticancer drugs; drugs for the treatment of rheumatoid arthritis, Crohn s disease, or psoriasis; or have you had radiation treatments?   No   Have you had a seizure, or a brain or other nervous system problem?   No   During the past year, have you received a transfusion of blood or blood    products, or been given immune (gamma) globulin or antiviral drug?   No   For women: Are you pregnant or is there a chance you could become       pregnant during the next month?   No   Have you received any vaccinations in the past 4 weeks?   No     Immunization questionnaire was positive for at least one answer.  Notified provider.      Patient instructed to remain in clinic for 15 minutes afterwards, and to report any adverse reactions.     Screening performed by Demetri Thompson MA on 10/16/2023 at 4:06 PM.

## 2023-10-17 LAB
CREAT UR-MCNC: 100 MG/DL
MICROALBUMIN UR-MCNC: <12 MG/L
MICROALBUMIN/CREAT UR: NORMAL MG/G{CREAT}
VIT B12 SERPL-MCNC: 3596 PG/ML (ref 232–1245)

## 2023-10-18 DIAGNOSIS — G62.9 POLYNEUROPATHY: ICD-10-CM

## 2023-10-19 RX ORDER — ACETAMINOPHEN 325 MG/1
TABLET ORAL
Qty: 180 TABLET | Refills: 0 | Status: SHIPPED | OUTPATIENT
Start: 2023-10-19 | End: 2024-03-04

## 2023-11-01 DIAGNOSIS — E11.29 TYPE 2 DIABETES MELLITUS WITH MICROALBUMINURIA, WITHOUT LONG-TERM CURRENT USE OF INSULIN (H): ICD-10-CM

## 2023-11-01 DIAGNOSIS — R80.9 TYPE 2 DIABETES MELLITUS WITH MICROALBUMINURIA, WITHOUT LONG-TERM CURRENT USE OF INSULIN (H): ICD-10-CM

## 2023-11-01 DIAGNOSIS — M54.50 ACUTE MIDLINE LOW BACK PAIN WITHOUT SCIATICA: ICD-10-CM

## 2023-11-01 DIAGNOSIS — N18.31 CHRONIC KIDNEY DISEASE, STAGE 3A (H): ICD-10-CM

## 2023-11-02 RX ORDER — CANAGLIFLOZIN 100 MG/1
TABLET, FILM COATED ORAL
Qty: 90 TABLET | Refills: 1 | Status: SHIPPED | OUTPATIENT
Start: 2023-11-02 | End: 2024-04-09

## 2023-11-02 RX ORDER — DOCUSATE SODIUM 50MG AND SENNOSIDES 8.6MG 8.6; 5 MG/1; MG/1
1 TABLET, FILM COATED ORAL 2 TIMES DAILY
Qty: 60 TABLET | Refills: 5 | Status: SHIPPED | OUTPATIENT
Start: 2023-11-02 | End: 2024-06-04

## 2023-11-09 DIAGNOSIS — Z12.11 COLON CANCER SCREENING: ICD-10-CM

## 2023-11-23 ENCOUNTER — LAB (OUTPATIENT)
Dept: FAMILY MEDICINE | Facility: CLINIC | Age: 78
End: 2023-11-23
Payer: MEDICARE

## 2023-11-23 DIAGNOSIS — Z12.11 COLON CANCER SCREENING: ICD-10-CM

## 2023-12-03 DIAGNOSIS — E87.1 HYPONATREMIA: ICD-10-CM

## 2023-12-03 DIAGNOSIS — E11.9 TYPE 2 DIABETES MELLITUS WITHOUT COMPLICATION (H): ICD-10-CM

## 2023-12-03 DIAGNOSIS — R80.9 TYPE 2 DIABETES MELLITUS WITH MICROALBUMINURIA, WITHOUT LONG-TERM CURRENT USE OF INSULIN (H): ICD-10-CM

## 2023-12-03 DIAGNOSIS — E11.29 TYPE 2 DIABETES MELLITUS WITH MICROALBUMINURIA, WITHOUT LONG-TERM CURRENT USE OF INSULIN (H): ICD-10-CM

## 2023-12-03 DIAGNOSIS — G62.9 POLYNEUROPATHY: ICD-10-CM

## 2023-12-03 DIAGNOSIS — I10 ESSENTIAL HYPERTENSION: ICD-10-CM

## 2023-12-04 DIAGNOSIS — I10 ESSENTIAL HYPERTENSION: ICD-10-CM

## 2023-12-04 RX ORDER — DILTIAZEM HYDROCHLORIDE 240 MG/1
CAPSULE, COATED, EXTENDED RELEASE ORAL
Qty: 90 CAPSULE | Refills: 0 | Status: SHIPPED | OUTPATIENT
Start: 2023-12-04 | End: 2024-03-01

## 2023-12-05 RX ORDER — LISINOPRIL 20 MG/1
20 TABLET ORAL DAILY
Qty: 90 TABLET | Refills: 2 | Status: SHIPPED | OUTPATIENT
Start: 2023-12-05 | End: 2024-08-20

## 2023-12-05 RX ORDER — GLIPIZIDE 5 MG/1
10 TABLET ORAL 2 TIMES DAILY WITH MEALS
Qty: 360 TABLET | Refills: 2 | Status: SHIPPED | OUTPATIENT
Start: 2023-12-05 | End: 2024-08-20

## 2023-12-06 RX ORDER — METFORMIN HCL 500 MG
1000 TABLET, EXTENDED RELEASE 24 HR ORAL 2 TIMES DAILY
Qty: 360 TABLET | Refills: 0 | Status: SHIPPED | OUTPATIENT
Start: 2023-12-06 | End: 2024-03-01

## 2023-12-06 RX ORDER — SODIUM CHLORIDE 1 G/1
2 TABLET ORAL DAILY
Qty: 60 TABLET | Refills: 0 | Status: SHIPPED | OUTPATIENT
Start: 2023-12-06

## 2023-12-06 RX ORDER — GABAPENTIN 100 MG/1
CAPSULE ORAL
Qty: 450 CAPSULE | Refills: 0 | Status: SHIPPED | OUTPATIENT
Start: 2023-12-06 | End: 2024-03-01

## 2023-12-12 LAB — NONINV COLON CA DNA+OCC BLD SCRN STL QL: NEGATIVE

## 2024-01-13 DIAGNOSIS — R80.9 TYPE 2 DIABETES MELLITUS WITH MICROALBUMINURIA, WITHOUT LONG-TERM CURRENT USE OF INSULIN (H): ICD-10-CM

## 2024-01-13 DIAGNOSIS — E11.29 TYPE 2 DIABETES MELLITUS WITH MICROALBUMINURIA, WITHOUT LONG-TERM CURRENT USE OF INSULIN (H): ICD-10-CM

## 2024-01-15 RX ORDER — ASPIRIN 81 MG/1
81 TABLET, COATED ORAL DAILY
Qty: 90 TABLET | Refills: 1 | Status: SHIPPED | OUTPATIENT
Start: 2024-01-15 | End: 2024-06-25

## 2024-02-25 DIAGNOSIS — G62.9 POLYNEUROPATHY: ICD-10-CM

## 2024-02-25 DIAGNOSIS — E11.9 TYPE 2 DIABETES MELLITUS WITHOUT COMPLICATION (H): ICD-10-CM

## 2024-02-29 DIAGNOSIS — I10 ESSENTIAL HYPERTENSION: ICD-10-CM

## 2024-03-01 RX ORDER — METFORMIN HCL 500 MG
1000 TABLET, EXTENDED RELEASE 24 HR ORAL 2 TIMES DAILY
Qty: 360 TABLET | Refills: 0 | Status: SHIPPED | OUTPATIENT
Start: 2024-03-01 | End: 2024-05-28

## 2024-03-01 RX ORDER — DILTIAZEM HYDROCHLORIDE 240 MG/1
CAPSULE, COATED, EXTENDED RELEASE ORAL
Qty: 90 CAPSULE | Refills: 0 | Status: SHIPPED | OUTPATIENT
Start: 2024-03-01 | End: 2024-05-28

## 2024-03-01 RX ORDER — GABAPENTIN 100 MG/1
CAPSULE ORAL
Qty: 450 CAPSULE | Refills: 0 | Status: SHIPPED | OUTPATIENT
Start: 2024-03-01 | End: 2024-05-31

## 2024-03-02 DIAGNOSIS — I10 ESSENTIAL HYPERTENSION: ICD-10-CM

## 2024-03-02 DIAGNOSIS — G62.9 POLYNEUROPATHY: ICD-10-CM

## 2024-03-03 ENCOUNTER — HEALTH MAINTENANCE LETTER (OUTPATIENT)
Age: 79
End: 2024-03-03

## 2024-03-04 RX ORDER — ACETAMINOPHEN 325 MG/1
325-650 TABLET ORAL EVERY 8 HOURS PRN
Qty: 180 TABLET | Refills: 0 | Status: SHIPPED | OUTPATIENT
Start: 2024-03-04

## 2024-03-04 RX ORDER — CARVEDILOL 25 MG/1
25 TABLET ORAL 2 TIMES DAILY WITH MEALS
Qty: 180 TABLET | Refills: 0 | Status: SHIPPED | OUTPATIENT
Start: 2024-03-04 | End: 2024-05-31

## 2024-04-08 DIAGNOSIS — N18.31 CHRONIC KIDNEY DISEASE, STAGE 3A (H): ICD-10-CM

## 2024-04-08 DIAGNOSIS — E11.29 TYPE 2 DIABETES MELLITUS WITH MICROALBUMINURIA, WITHOUT LONG-TERM CURRENT USE OF INSULIN (H): ICD-10-CM

## 2024-04-08 DIAGNOSIS — R80.9 TYPE 2 DIABETES MELLITUS WITH MICROALBUMINURIA, WITHOUT LONG-TERM CURRENT USE OF INSULIN (H): ICD-10-CM

## 2024-04-09 RX ORDER — CANAGLIFLOZIN 100 MG/1
TABLET, FILM COATED ORAL
Qty: 90 TABLET | Refills: 0 | Status: SHIPPED | OUTPATIENT
Start: 2024-04-09 | End: 2024-06-25

## 2024-04-22 NOTE — ED PROVIDER NOTES
EMERGENCY DEPARTMENT ENCOUNTER      NAME: Vashti King  AGE: 77 year old male  YOB: 1945  MRN: [id number]  EVALUATION DATE & TIME: 5/9/2023  7:56 AM    PCP: Edison Banda    ED PROVIDER: Lisa Nice PA-C      Chief Complaint   Patient presents with     Vomiting         FINAL IMPRESSION:  1. Hyponatremia    2. Polyneuropathy    3. Diabetes mellitus, type 2 (H)    4. Vomiting        MEDICAL DECISION MAKING:    Pertinent Labs & Imaging studies reviewed. (See chart for details)  77 year old male presents to the Emergency Department for evaluation of nausea and vomiting.  The patient has been dealing with polyneuropathy for the past week and was seen in our emergency department twice for this.  He was started on gabapentin however, yesterday he started to have significant nausea and vomiting with eating.  He continued to have significant muscle aches, tingling and again was not able to eat without vomiting and was concerned about his symptoms so he presents to the emergency department.  On my evaluation, the patient was initially hypertensive at 159/95 but otherwise vitally stable.  Examination with heart and regular rate and rhythm and lungs clear to auscultation bilaterally.  Abdomen soft, nontender without any rebound or guarding.  5 out of 5 strength and sensation bilateral upper and lower extremities.  Differential diagnosis included, but is not limited to, electrolyte derangement, anemia, DKA, rhabdo myelitis, medication reaction, abnormal liver enzymes, cholecystitis, pancreatitis.    Patient has been able to tolerate fluids and I did want to wait on laboratory evaluation prior to starting normal saline.  CBC without any significant derangements.  Lipase is normal at 30.  LFTs with slightly elevated bilirubin of 1.6 and slightly elevated bilirubin direct of 0.36 and no other significant derangements.  CK is normal at 301.  BMP shows a significantly low sodium of 120 with low chloride at 81.  He  has no other significant derangements.  With his significantly low sodium of 120 he does require admission to the hospital.  At this time it is unclear the cause of his hyponatremia however, could be due to fluid loss versus starting gabapentin versus other.  I discussed findings with the patient and his son and they were in agreement and understanding.  He was started on an infusion of normal saline at a rate of 75 mL/h. I did explain the boarding crisis and at this time they would not like to be transferred and would like to remain here at [facility].  I discussed with them that they will be remained here in the emergency department and is unclear when he will get a bed upstairs however, we will continue to care for him here.  Patient was not have any significant improvement in his symptoms I did order some Tylenol and ibuprofen.  I spoke with hospitalist Dr. Torres who would like me to add on a VBG as well as beta hydroxybutyrate to assess for possible DKA and accepted the patient for admission.    Critical Care time was 30 minutes for this patient excluding procedures.     Medical Decision Making    History:    Supplemental history from: Family Member/Significant Other    External Record(s) reviewed: Outpatient Record: 5/6/23, 5/7/23    Work Up:    Chart documentation includes differential considered and any EKGs or imaging independently interpreted by provider, where specified.    In additional to work up documented, I considered the following work up: Documented in chart, if applicable.    External consultation:    Discussion of management with another provider: Hospitalist    Complicating factors:    Care impacted by chronic illness: Chronic Kidney Disease, Diabetes, Hyperlipidemia and Hypertension    Care affected by social determinants of health: N/A    Disposition considerations: Admit.         ED COURSE:  8:21 AM I met with the patient, obtained history, performed an initial exam, and discussed options  and plan for diagnostics and treatment here in the ED.    9:41 AM I rechecked and updated the patient. He agrees to admission and prefers not to transfer for admission.    9:51 AM I staffed the patient with Dr. Monaco.    9:52 AM Paged hospitalist.    10:29 AM Discussed the patient with hospitalist, Dr. Torres, who accepted the patient for admission.    At the conclusion of the encounter I discussed the results of all of the tests and the disposition. The questions were answered. The patient or family acknowledged understanding and was agreeable with the care plan.     MEDICATIONS GIVEN IN THE EMERGENCY:  Medications   ondansetron (ZOFRAN ODT) ODT tab 4 mg (4 mg Oral $Given 5/9/23 0841)   gabapentin (NEURONTIN) capsule 100 mg (100 mg Oral $Given 5/9/23 0841)   sodium chloride 0.9% infusion ( Intravenous $New Bag 5/9/23 0943)   acetaminophen (TYLENOL) tablet 650 mg (650 mg Oral $Given 5/9/23 0952)   ibuprofen (ADVIL/MOTRIN) tablet 600 mg (600 mg Oral $Given 5/9/23 0952)       NEW PRESCRIPTIONS STARTED AT TODAY'S ER VISIT  New Prescriptions    No medications on file            =================================================================    HPI:    Patient information was obtained from: patient and son    Use of Interpretor: Yes (Phone) - Language Dayan King is a 77 year old male with a pertinent history of DMII, HTN, HLD, BPH, and CKD stage III who presents to this ED by private car for evaluation of vomiting and muscle aches.    Per chart review, patient was recently seen in ED twice. Seen on 5/6/23 for paresthesia of BUE and BLE of a few weeks duration. Discharged with gabapentin. Returned on 5/7/23 for similar symptoms. Was not able to fill gabapentin due to pharmacy closure so returned for treatment. Treated again with gabapentin and discharged with plan for PCP follow up on 6/1.    Patient presents now for nausea and vomiting since starting gabapentin. He notes symptoms started at breakfast  "on 5/8 after starting gabapentin. He has not been able to keep anything down since then except water. Has been able to take his medications as prescribed however. Has no abdominal pain. No diarrhea. Also notes subjective fevers.     He also continues to have tingling in his hands and feet and burning pain throughout his skin like \"rubbing hot peppers on it\". Has had difficulty sleeping due to symptoms for the past week.  This is unchanged from when he was seen in the emergency department on both 5 6 and 5 7.  He was only able to start the gabapentin yesterday.    He denies any chest pain, shortness of breath, urinary symptoms, lower extremity weakness, loss of control of bowel or bladder, difficulty walking, blood in his vomit or stool or any other concerning symptoms.      REVIEW OF SYSTEMS:  Negative unless otherwise stated in the above HPI.      PAST MEDICAL HISTORY:  Past Medical History:   Diagnosis Date     BPH (benign prostatic hyperplasia)      Diabetes mellitus (H)      Diabetes mellitus, type II (H)      High cholesterol      Hyperlipidemia      Hypertension        PAST SURGICAL HISTORY:  No past surgical history on file.        CURRENT MEDICATIONS:    No current facility-administered medications for this encounter.    Current Outpatient Medications:      aspirin (ASA) 81 MG EC tablet, Take 1 tablet (81 mg) by mouth daily, Disp: 90 tablet, Rfl: 3     atorvastatin (LIPITOR) 40 MG tablet, TAKE 1 TABLET BY MOUTH EVERY DAY, Disp: 90 tablet, Rfl: 2     blood glucose (CONTOUR TEST) test strip, Use to test blood sugar once daily or as directed., Disp: 100 strip, Rfl: 3     diltiazem ER COATED BEADS (CARDIZEM CD/CARTIA XT) 240 MG 24 hr capsule, TAKE 1 CAPSULE BY MOUTH EVERY DAY, Disp: 90 capsule, Rfl: 1     gabapentin (NEURONTIN) 100 MG capsule, Take 1 capsule (100 mg) by mouth 2 times daily for 7 days, THEN 2 capsules (200 mg) 2 times daily for 7 days, THEN 3 capsules (300 mg) 2 times daily for 7 days, THEN 3 " "capsules (300 mg) 3 times daily for 7 days., Disp: 147 capsule, Rfl: 0     generic lancets (FINGERSTIX LANCETS), [GENERIC LANCETS (FINGERSTIX LANCETS)] Check blood sugar twice daily.  Uses Insulin.  Diagnosis E11.9, Disp: 100 each, Rfl: 1     glipiZIDE (GLUCOTROL) 5 MG tablet, TAKE 2 TABLETS BY MOUTH TWICE A DAY WITH MEALS, Disp: 360 tablet, Rfl: 1     hydrochlorothiazide (HYDRODIURIL) 12.5 MG tablet, Take 1 tablet (12.5 mg) by mouth daily, Disp: 90 tablet, Rfl: 3     INVOKANA 100 MG tablet, TAKE 1 TABLET (100 MG) BY MOUTH EVERY MORNING (BEFORE BREAKFAST), Disp: 90 tablet, Rfl: 0     lisinopril (ZESTRIL) 40 MG tablet, Take 1 tablet (40 mg) by mouth daily, Disp: 90 tablet, Rfl: 2     loratadine (CLARITIN) 10 MG tablet, TAKE 1 TABLET BY MOUTH EVERY DAY - **OTC ITEM - DRUG NOT COVERED--USE DISCOUNT CARD**, Disp: 90 tablet, Rfl: 0     metFORMIN (GLUCOPHAGE XR) 500 MG 24 hr tablet, TAKE 2 TABLETS BY MOUTH TWICE A DAY WITH FOOD, Disp: 360 tablet, Rfl: 3     metoprolol succinate ER (TOPROL XL) 50 MG 24 hr tablet, Take 1 tablet (50 mg) by mouth daily, Disp: 90 tablet, Rfl: 2     Microlet Lancets MISC, 100 each daily Use to test blood sugar once daily., Disp: 100 each, Rfl: 4     pen needle, diabetic 32 gauge x 1/4\" Ndle, [PEN NEEDLE, DIABETIC 32 GAUGE X 1/4\" NDLE] Use as needed with insulin, Disp: 100 each, Rfl: 1     tamsulosin (FLOMAX) 0.4 MG capsule, Take 1 capsule (0.4 mg) by mouth daily (with dinner), Disp: 90 capsule, Rfl: 1      ALLERGIES:  Allergies   Allergen Reactions     Amlodipine Swelling     Swelling in legs        FAMILY HISTORY:  Family History   Problem Relation Age of Onset     Diabetes Sister      Hypertension Sister      Diabetes Brother      Hypertension Brother      Cerebrovascular Disease Brother        SOCIAL HISTORY:   Social History     Socioeconomic History     Marital status:    Tobacco Use     Smoking status: Never     Smokeless tobacco: Never   Substance and Sexual Activity     Drug use: " "No       VITALS:  Patient Vitals for the past 24 hrs:   BP Temp Temp src Pulse Resp SpO2 Height Weight   05/09/23 0920 (!) 158/90 97.7  F (36.5  C) Oral 96 -- 97 % -- --   05/09/23 0752 (!) 159/95 (!) 96.5  F (35.8  C) Tympanic 96 12 98 % 1.651 m (5' 5\") 81.6 kg (180 lb)       PHYSICAL EXAM    Constitutional: Well developed, Well nourished, NAD  HENT: Normocephalic, Atraumatic, Bilateral external ears normal, Oropharynx normal, mucous membranes moist, Nose normal.   Neck: Normal range of motion, No tenderness, Supple, No stridor.  Eyes: Eyes track normally throughout exam, Conjunctiva normal, No discharge.   Respiratory: Normal breath sounds, No respiratory distress, No wheezing, Speaks full sentences easily. No cough.  Cardiovascular: Normal heart rate, Regular rhythm, No murmurs, No rubs, No gallops. Chest wall nontender.  GI: Soft, No tenderness, No masses, No flank tenderness. No rebound or guarding.  Musculoskeletal: 2+ DP pulses. No edema. No cyanosis, No clubbing. Good range of motion in all major joints. No tenderness to palpation or major deformities noted. No tenderness of the CTLS spine.   Integument: Warm, Dry, No erythema, No rash. No petechiae.  Neurologic: Alert & oriented x 3, Normal motor function, Normal sensory function, No focal deficits noted. Normal gait.  Psychiatric: Affect normal, Judgment normal, Mood normal. Cooperative.    LAB:  All pertinent labs reviewed and interpreted.  Recent Results (from the past 24 hour(s))   CBC (+ platelets, no diff)    Collection Time: 05/09/23  8:47 AM   Result Value Ref Range    WBC Count 7.6 4.0 - 11.0 10e3/uL    RBC Count 5.48 4.40 - 5.90 10e6/uL    Hemoglobin 16.8 13.3 - 17.7 g/dL    Hematocrit 45.2 40.0 - 53.0 %    MCV 83 78 - 100 fL    MCH 30.7 26.5 - 33.0 pg    MCHC 37.2 (H) 31.5 - 36.5 g/dL    RDW 11.8 10.0 - 15.0 %    Platelet Count 273 150 - 450 10e3/uL   Basic metabolic panel    Collection Time: 05/09/23  8:47 AM   Result Value Ref Range    Sodium " 120 (L) 136 - 145 mmol/L    Potassium 4.0 3.4 - 5.3 mmol/L    Chloride 81 (L) 98 - 107 mmol/L    Carbon Dioxide (CO2) 20 (L) 22 - 29 mmol/L    Anion Gap 19 (H) 7 - 15 mmol/L    Urea Nitrogen 15.5 8.0 - 23.0 mg/dL    Creatinine 0.78 0.67 - 1.17 mg/dL    Calcium 9.3 8.8 - 10.2 mg/dL    Glucose 176 (H) 70 - 99 mg/dL    GFR Estimate >90 >60 mL/min/1.73m2   Lipase    Collection Time: 05/09/23  8:47 AM   Result Value Ref Range    Lipase 30 13 - 60 U/L   Hepatic function panel    Collection Time: 05/09/23  8:47 AM   Result Value Ref Range    Protein Total 8.1 6.4 - 8.3 g/dL    Albumin 4.5 3.5 - 5.2 g/dL    Bilirubin Total 1.6 (H) <=1.2 mg/dL    Alkaline Phosphatase 61 40 - 129 U/L    AST 29 10 - 50 U/L    ALT 21 10 - 50 U/L    Bilirubin Direct 0.36 (H) 0.00 - 0.30 mg/dL   CK total    Collection Time: 05/09/23  8:47 AM   Result Value Ref Range     39 - 308 U/L         RADIOLOGY:  Reviewed all pertinent imaging. Please see official radiology report.  No orders to display     ECG  ECG (reviewed and interpreted):  Performed at 1004  HR: 89 bpm  Sinus rhythm with 1st degree AV block  Possible Left atrial enlargement  Left axis deviation  Inferior infarct, age undetermined  Anteroseptal infarct, age undetermined  Abnormal ECG  When compared with ECG 5/6/23, no change from prior  I have reviewed and interpreted the patient's EKG, with comments made as listed above.  Please see scanned image for full interpretation.       I, Warren Griffith, am serving as a scribe to document services personally performed by Lisa Nice PA-C based on my observation and the provider's statements to me. I, Lisa Nice PA-C attest that Warren Griffith is acting in a scribe capacity, has observed my performance of the services and has documented them in accordance with my direction.    Lisa Nice PA-C  Emergency Medicine  [facility]  5/9/2023     Lisa Nice PA-C  05/09/23 1041     No - the patient is unable to be screened due to medical condition

## 2024-04-25 DIAGNOSIS — R80.9 TYPE 2 DIABETES MELLITUS WITH MICROALBUMINURIA, WITHOUT LONG-TERM CURRENT USE OF INSULIN (H): ICD-10-CM

## 2024-04-25 DIAGNOSIS — E11.29 TYPE 2 DIABETES MELLITUS WITH MICROALBUMINURIA, WITHOUT LONG-TERM CURRENT USE OF INSULIN (H): ICD-10-CM

## 2024-04-25 RX ORDER — LANCETS
EACH MISCELLANEOUS
Qty: 200 EACH | Refills: 2 | Status: SHIPPED | OUTPATIENT
Start: 2024-04-25 | End: 2024-07-24

## 2024-05-12 ENCOUNTER — HEALTH MAINTENANCE LETTER (OUTPATIENT)
Age: 79
End: 2024-05-12

## 2024-05-22 DIAGNOSIS — M54.16 LUMBAR BACK PAIN WITH RADICULOPATHY AFFECTING LOWER EXTREMITY: ICD-10-CM

## 2024-05-28 DIAGNOSIS — E11.9 TYPE 2 DIABETES MELLITUS WITHOUT COMPLICATION (H): ICD-10-CM

## 2024-05-28 DIAGNOSIS — I10 ESSENTIAL HYPERTENSION: ICD-10-CM

## 2024-05-28 RX ORDER — DILTIAZEM HYDROCHLORIDE 240 MG/1
CAPSULE, COATED, EXTENDED RELEASE ORAL
Qty: 90 CAPSULE | Refills: 0 | Status: SHIPPED | OUTPATIENT
Start: 2024-05-28

## 2024-05-28 RX ORDER — METFORMIN HCL 500 MG
1000 TABLET, EXTENDED RELEASE 24 HR ORAL 2 TIMES DAILY
Qty: 360 TABLET | Refills: 0 | Status: SHIPPED | OUTPATIENT
Start: 2024-05-28 | End: 2024-08-26

## 2024-05-28 NOTE — TELEPHONE ENCOUNTER
Metformin renewed    Future Appointments   Date Time Provider Department Center   6/4/2024  8:20 AM Edison Banda MD [facility]

## 2024-05-29 DIAGNOSIS — I10 ESSENTIAL HYPERTENSION: ICD-10-CM

## 2024-05-29 DIAGNOSIS — G62.9 POLYNEUROPATHY: ICD-10-CM

## 2024-05-31 RX ORDER — CARVEDILOL 25 MG/1
25 TABLET ORAL 2 TIMES DAILY WITH MEALS
Qty: 180 TABLET | Refills: 0 | Status: SHIPPED | OUTPATIENT
Start: 2024-05-31 | End: 2024-08-29

## 2024-05-31 RX ORDER — GABAPENTIN 100 MG/1
CAPSULE ORAL
Qty: 450 CAPSULE | Refills: 0 | Status: SHIPPED | OUTPATIENT
Start: 2024-05-31 | End: 2024-09-15

## 2024-06-04 ENCOUNTER — OFFICE VISIT (OUTPATIENT)
Dept: FAMILY MEDICINE | Facility: CLINIC | Age: 79
End: 2024-06-04
Payer: MEDICARE

## 2024-06-04 VITALS
SYSTOLIC BLOOD PRESSURE: 147 MMHG | RESPIRATION RATE: 18 BRPM | TEMPERATURE: 97.7 F | HEART RATE: 52 BPM | WEIGHT: 171 LBS | DIASTOLIC BLOOD PRESSURE: 87 MMHG | BODY MASS INDEX: 29.19 KG/M2 | HEIGHT: 64 IN | OXYGEN SATURATION: 96 %

## 2024-06-04 DIAGNOSIS — R80.9 TYPE 2 DIABETES MELLITUS WITH MICROALBUMINURIA, WITHOUT LONG-TERM CURRENT USE OF INSULIN (H): Primary | ICD-10-CM

## 2024-06-04 DIAGNOSIS — G63 POLYNEUROPATHY ASSOCIATED WITH UNDERLYING DISEASE (H): ICD-10-CM

## 2024-06-04 DIAGNOSIS — E11.29 TYPE 2 DIABETES MELLITUS WITH MICROALBUMINURIA, WITHOUT LONG-TERM CURRENT USE OF INSULIN (H): Primary | ICD-10-CM

## 2024-06-04 DIAGNOSIS — M54.50 ACUTE MIDLINE LOW BACK PAIN WITHOUT SCIATICA: ICD-10-CM

## 2024-06-04 DIAGNOSIS — I10 ESSENTIAL HYPERTENSION: ICD-10-CM

## 2024-06-04 DIAGNOSIS — E87.1 HYPONATREMIA: ICD-10-CM

## 2024-06-04 PROBLEM — R11.10 VOMITING: Status: RESOLVED | Noted: 2023-05-09 | Resolved: 2024-06-04

## 2024-06-04 PROBLEM — E11.9 DIABETES MELLITUS, TYPE 2 (H): Status: RESOLVED | Noted: 2023-05-09 | Resolved: 2024-06-04

## 2024-06-04 LAB — HBA1C MFR BLD: 6.8 % (ref 0–5.6)

## 2024-06-04 PROCEDURE — 80048 BASIC METABOLIC PNL TOTAL CA: CPT | Performed by: FAMILY MEDICINE

## 2024-06-04 PROCEDURE — 99214 OFFICE O/P EST MOD 30 MIN: CPT | Performed by: FAMILY MEDICINE

## 2024-06-04 PROCEDURE — 83036 HEMOGLOBIN GLYCOSYLATED A1C: CPT | Performed by: FAMILY MEDICINE

## 2024-06-04 PROCEDURE — 36415 COLL VENOUS BLD VENIPUNCTURE: CPT | Performed by: FAMILY MEDICINE

## 2024-06-04 PROCEDURE — G2211 COMPLEX E/M VISIT ADD ON: HCPCS | Performed by: FAMILY MEDICINE

## 2024-06-04 RX ORDER — AMOXICILLIN 250 MG
1 CAPSULE ORAL 2 TIMES DAILY
Qty: 60 TABLET | Refills: 5 | Status: SHIPPED | OUTPATIENT
Start: 2024-06-04

## 2024-06-04 NOTE — PROGRESS NOTES
"  Assessment & Plan     Type 2 diabetes mellitus with microalbuminuria, without long-term current use of insulin (H)  Remains well-controlled.  Will continue current Invokana, glipizide, metformin.  Since sugars controlled, okay to maintain the once daily dosing of glipizide and metformin.  - Basic metabolic panel  (Ca, Cl, CO2, Creat, Gluc, K, Na, BUN)  - HEMOGLOBIN A1C  - Basic metabolic panel  (Ca, Cl, CO2, Creat, Gluc, K, Na, BUN)    Essential hypertension  Has been somewhat labile.  Reduced dose of carvedilol at last visit.  Pressure now somewhat elevated but patient states he did not take medication this morning as usual.  Instead of adding or changing medicines we will have him check and call in 1 week to follow-up on how blood pressures have been at home.    Hyponatremia  Seemingly resolved after cessation of hydrochlorothiazide.    Polyneuropathy associated with underlying disease (H24)  Will continue gabapentin.    Acute midline low back pain without sciatica  Concerns of constipation.  Will renew:  - senna-docusate (SENEXON-S) 8.6-50 MG tablet  Dispense: 60 tablet; Refill: 5      The longitudinal plan of care for the diagnosis(es)/condition(s) as documented were addressed during this visit. Due to the added complexity in care, I will continue to support Vashti in the subsequent management and with ongoing continuity of care.       BMI  Estimated body mass index is 29.34 kg/m  as calculated from the following:    Height as of this encounter: 1.626 m (5' 4.02\").    Weight as of this encounter: 77.6 kg (171 lb).     Moris Kingston is a 78 year old, presenting for the following health issues:  Recheck Medication (Pt stated would like to see if he can get any medication to help with strength and lowers some if the doses in the medications, pt would also like medication for soften his poop ) and Pre-driving Assessment (Pt would like to see if he can get his driving license back )      6/4/2024     7:58 " "AM   Additional Questions   Roomed by Oksanajulio   Accompanied by son     History of Present Illness       Diabetes:   He presents for follow up of diabetes.  He is checking home blood glucose one time daily.   He checks blood glucose before meals.  Blood glucose is never over 200 and sometimes under 70.  When his blood glucose is low, the patient is asymptomatic for confusion, blurred vision, lethargy and reports not feeling dizzy, shaky, or weak.  He is concerned about other.   He is having numbness in feet.  The patient has not had a diabetic eye exam in the last 12 months.          He eats 2-3 servings of fruits and vegetables daily.He consumes 0 sweetened beverage(s) daily.He exercises with enough effort to increase his heart rate 20 to 29 minutes per day.  He exercises with enough effort to increase his heart rate 5 days per week.   He is taking medications regularly.     Has stopped taking second doses of glipizide and metformin every day.  Says sugars usually 70 -90 in AM.  Does not get symptoms at this level.    I searched the chart and could not find any mention of removing his driving privileges.  Discussed the need to self monitor or have family monitor him for ability to drive safely.  I do not feel he is medically disqualified at this time.        Objective    BP (!) 147/87 (BP Location: Left arm, Patient Position: Sitting, Cuff Size: Adult Regular)   Pulse 52   Temp 97.7  F (36.5  C) (Temporal)   Resp 18   Ht 1.626 m (5' 4.02\")   Wt 77.6 kg (171 lb)   SpO2 96%   BMI 29.34 kg/m    Body mass index is 29.34 kg/m .     BP Readings from Last 6 Encounters:   06/04/24 (!) 147/87   10/16/23 127/76   08/16/23 (!) 82/54   07/05/23 97/66   06/13/23 130/76   06/12/23 130/76     Physical Exam   GENERAL: alert, not distressed  CHEST: clear, no rales, rhonchi, or wheezes  CARDIAC: regular without murmur, gallop, or rub      Results for orders placed or performed in visit on 06/04/24 (from the past 24 hour(s)) "   HEMOGLOBIN A1C   Result Value Ref Range    Hemoglobin A1C 6.8 (H) 0.0 - 5.6 %       Prior to immunization administration, verified patients identity using patient s name and date of birth. Please see Immunization Activity for additional information.     Screening Questionnaire for Adult Immunization    Are you sick today?   No   Do you have allergies to medications, food, a vaccine component or latex?   No   Have you ever had a serious reaction after receiving a vaccination?   No   Do you have a long-term health problem with heart, lung, kidney, or metabolic disease (e.g., diabetes), asthma, a blood disorder, no spleen, complement component deficiency, a cochlear implant, or a spinal fluid leak?  Are you on long-term aspirin therapy?   Yes   Do you have cancer, leukemia, HIV/AIDS, or any other immune system problem?   No   Do you have a parent, brother, or sister with an immune system problem?   No   In the past 3 months, have you taken medications that affect  your immune system, such as prednisone, other steroids, or anticancer drugs; drugs for the treatment of rheumatoid arthritis, Crohn s disease, or psoriasis; or have you had radiation treatments?   No   Have you had a seizure, or a brain or other nervous system problem?   No   During the past year, have you received a transfusion of blood or blood    products, or been given immune (gamma) globulin or antiviral drug?   No   For women: Are you pregnant or is there a chance you could become       pregnant during the next month?   No   Have you received any vaccinations in the past 4 weeks?   No     Immunization questionnaire was positive for at least one answer.  Notified Dr Banda.      Patient instructed to remain in clinic for 15 minutes afterwards, and to report any adverse reactions.     Screening performed by Juan Apodaca on 6/4/2024 at 8:11 AM.     Signed Electronically by: Edison Banda MD

## 2024-06-05 LAB
ANION GAP SERPL CALCULATED.3IONS-SCNC: 11 MMOL/L (ref 7–15)
BUN SERPL-MCNC: 14.7 MG/DL (ref 8–23)
CALCIUM SERPL-MCNC: 9.8 MG/DL (ref 8.8–10.2)
CHLORIDE SERPL-SCNC: 104 MMOL/L (ref 98–107)
CREAT SERPL-MCNC: 1.12 MG/DL (ref 0.67–1.17)
DEPRECATED HCO3 PLAS-SCNC: 27 MMOL/L (ref 22–29)
EGFRCR SERPLBLD CKD-EPI 2021: 68 ML/MIN/1.73M2
GLUCOSE SERPL-MCNC: 106 MG/DL (ref 70–99)
POTASSIUM SERPL-SCNC: 4.2 MMOL/L (ref 3.4–5.3)
SODIUM SERPL-SCNC: 142 MMOL/L (ref 135–145)

## 2024-06-10 ENCOUNTER — APPOINTMENT (OUTPATIENT)
Dept: INTERPRETER SERVICES | Facility: CLINIC | Age: 79
End: 2024-06-10
Payer: MEDICARE

## 2024-06-10 ENCOUNTER — TELEPHONE (OUTPATIENT)
Dept: FAMILY MEDICINE | Facility: CLINIC | Age: 79
End: 2024-06-10
Payer: MEDICARE

## 2024-06-10 NOTE — TELEPHONE ENCOUNTER
Contacted patient using an  and patient only took 1 BP reading yesterday, 6/9/24. 145/81. Has NOT been checking BP.  Patient will take BP daily and call clinic back with BP readings.   gave patient phone number to Hmong line.    Message routed to Dr Banda for FYI.    Sabiha Shankar RN  [facility]    Edison Banda MD  [facility] Nurse [facility]  Please call next week to check up on blood pressure readings at home.

## 2024-06-24 DIAGNOSIS — E87.1 HYPONATREMIA: ICD-10-CM

## 2024-06-24 DIAGNOSIS — E87.6 HYPOKALEMIA: ICD-10-CM

## 2024-06-24 DIAGNOSIS — I10 ESSENTIAL HYPERTENSION: ICD-10-CM

## 2024-06-24 DIAGNOSIS — N18.31 CHRONIC KIDNEY DISEASE, STAGE 3A (H): ICD-10-CM

## 2024-06-24 DIAGNOSIS — R80.9 TYPE 2 DIABETES MELLITUS WITH MICROALBUMINURIA, WITHOUT LONG-TERM CURRENT USE OF INSULIN (H): ICD-10-CM

## 2024-06-24 DIAGNOSIS — E11.29 TYPE 2 DIABETES MELLITUS WITH MICROALBUMINURIA, WITHOUT LONG-TERM CURRENT USE OF INSULIN (H): ICD-10-CM

## 2024-06-25 RX ORDER — ASPIRIN 81 MG/1
81 TABLET, COATED ORAL DAILY
Qty: 90 TABLET | Refills: 0 | Status: SHIPPED | OUTPATIENT
Start: 2024-06-25

## 2024-06-25 RX ORDER — CANAGLIFLOZIN 100 MG/1
TABLET, FILM COATED ORAL
Qty: 90 TABLET | Refills: 0 | Status: SHIPPED | OUTPATIENT
Start: 2024-06-25

## 2024-06-25 RX ORDER — POTASSIUM CHLORIDE 1500 MG/1
20 TABLET, EXTENDED RELEASE ORAL DAILY
Qty: 90 TABLET | Refills: 0 | Status: SHIPPED | OUTPATIENT
Start: 2024-06-25

## 2024-06-26 RX ORDER — FUROSEMIDE 20 MG
20 TABLET ORAL DAILY
Qty: 90 TABLET | Refills: 3 | Status: SHIPPED | OUTPATIENT
Start: 2024-06-26

## 2024-07-03 NOTE — TELEPHONE ENCOUNTER
Contacted patient and scheduled appointment for Nurse BP check on 7/15/24  No further action needed.    Sabiha Shankar RN  [facility]    Dr Banda  Should have BP follow-up with nurses.

## 2024-07-08 NOTE — TELEPHONE ENCOUNTER
7/15/2024  9:00 AM RN VISIT 30 min [facility] RN 1   Location Instructions:    [facility] is located at [address] [place] [address] in [place], at the intersection of [address]. This is one block south of the [facility]. Free parking is available in the lot directly north of the clinic across [address]. The clinic is near stops along bus [address].

## 2024-07-15 ENCOUNTER — ALLIED HEALTH/NURSE VISIT (OUTPATIENT)
Dept: FAMILY MEDICINE | Facility: CLINIC | Age: 79
End: 2024-07-15
Payer: MEDICARE

## 2024-07-15 VITALS — SYSTOLIC BLOOD PRESSURE: 160 MMHG | HEART RATE: 66 BPM | DIASTOLIC BLOOD PRESSURE: 100 MMHG

## 2024-07-15 DIAGNOSIS — I10 ESSENTIAL HYPERTENSION: Primary | ICD-10-CM

## 2024-07-15 DIAGNOSIS — E11.29 TYPE 2 DIABETES MELLITUS WITH MICROALBUMINURIA, WITHOUT LONG-TERM CURRENT USE OF INSULIN (H): ICD-10-CM

## 2024-07-15 DIAGNOSIS — R80.9 TYPE 2 DIABETES MELLITUS WITH MICROALBUMINURIA, WITHOUT LONG-TERM CURRENT USE OF INSULIN (H): ICD-10-CM

## 2024-07-15 PROCEDURE — 99207 PR NO CHARGE NURSE ONLY: CPT

## 2024-07-15 RX ORDER — LANCETS
EACH MISCELLANEOUS
Qty: 100 EACH | Refills: 4 | Status: SHIPPED | OUTPATIENT
Start: 2024-07-15 | End: 2024-07-24

## 2024-07-15 NOTE — Clinical Note
Disposition:  Message sent to PCP, Dr Banda for BP check and patient also requesting medication refill for lancets Discussed with patient that he has a Elieser sensor. Thanks

## 2024-07-15 NOTE — PROGRESS NOTES
MERRITT King is a 78 year old year old patient who comes in today for a Blood Pressure check because of ongoing blood pressure monitoring.  Vital Signs as repeated by /100  Patient is taking medication as prescribed. Patient took BP medication 90 minutes ago  Patient is tolerating medications well.  Patient is monitoring Blood Pressure at home.  Average readings if yes are Unknown  Current complaints: none  Disposition:  Message sent to PCP, Dr Banda for BP check and patient also requesting medication refill for lancets  Discussed with patient that he has a Elieser sensor    Sabiha Shankar RN  [facility]      Essential hypertension   6/4/24  Has been somewhat labile.  Reduced dose of carvedilol at last visit.  Pressure now somewhat elevated but patient states he did not take medication this morning as usual.  Instead of adding or changing medicines we will have him check and call in 1 week to follow-up on how blood pressures have been at home.

## 2024-07-18 ENCOUNTER — APPOINTMENT (OUTPATIENT)
Dept: INTERPRETER SERVICES | Facility: CLINIC | Age: 79
End: 2024-07-18
Payer: MEDICARE

## 2024-07-18 NOTE — PROGRESS NOTES
Blood pressure above goal range.  Please schedule follow-up with me to recheck.  Next available orders or slot is okay.

## 2024-07-24 ENCOUNTER — OFFICE VISIT (OUTPATIENT)
Dept: FAMILY MEDICINE | Facility: CLINIC | Age: 79
End: 2024-07-24
Payer: MEDICARE

## 2024-07-24 ENCOUNTER — TELEPHONE (OUTPATIENT)
Dept: FAMILY MEDICINE | Facility: CLINIC | Age: 79
End: 2024-07-24

## 2024-07-24 VITALS
RESPIRATION RATE: 16 BRPM | DIASTOLIC BLOOD PRESSURE: 81 MMHG | HEIGHT: 63 IN | SYSTOLIC BLOOD PRESSURE: 123 MMHG | HEART RATE: 78 BPM | BODY MASS INDEX: 29.59 KG/M2 | TEMPERATURE: 97.9 F | OXYGEN SATURATION: 99 % | WEIGHT: 167 LBS

## 2024-07-24 DIAGNOSIS — I10 ESSENTIAL HYPERTENSION: Primary | ICD-10-CM

## 2024-07-24 DIAGNOSIS — R80.9 TYPE 2 DIABETES MELLITUS WITH MICROALBUMINURIA, WITHOUT LONG-TERM CURRENT USE OF INSULIN (H): ICD-10-CM

## 2024-07-24 DIAGNOSIS — E11.29 TYPE 2 DIABETES MELLITUS WITH MICROALBUMINURIA, WITHOUT LONG-TERM CURRENT USE OF INSULIN (H): ICD-10-CM

## 2024-07-24 PROCEDURE — G2211 COMPLEX E/M VISIT ADD ON: HCPCS | Performed by: FAMILY MEDICINE

## 2024-07-24 PROCEDURE — 99213 OFFICE O/P EST LOW 20 MIN: CPT | Performed by: FAMILY MEDICINE

## 2024-07-24 RX ORDER — LANCETS
EACH MISCELLANEOUS
Qty: 200 EACH | Refills: 2 | Status: SHIPPED | OUTPATIENT
Start: 2024-07-24 | End: 2024-07-24

## 2024-07-24 RX ORDER — LANCETS
EACH MISCELLANEOUS
Qty: 100 EACH | Refills: 4 | Status: SHIPPED | OUTPATIENT
Start: 2024-07-24

## 2024-07-24 NOTE — PROGRESS NOTES
"  Assessment & Plan     Essential hypertension  Better control today.  Would continue medications.    Patient concerned about not feeling very strong..  Offered physical therapy referral he declined, citing that he goes for walks daily and feels okay with those.    Type 2 diabetes mellitus with microalbuminuria, without long-term current use of insulin (H)  See [facility]  Referred.  Refill lancets.  See me for DM in 1-2 months  - Microlet Lancets MISC  - Adult Eye  Referral    The longitudinal plan of care for the diagnosis(es)/condition(s) as documented were addressed during this visit. Due to the added complexity in care, I will continue to support MERRITT GARZA in the subsequent management and with ongoing continuity of care.         BMI  Estimated body mass index is 29.59 kg/m  as calculated from the following:    Height as of this encounter: 1.6 m (5' 2.99\").    Weight as of this encounter: 75.8 kg (167 lb).         Subjective   MERRITT GARZA is a 78 year old, presenting for the following health issues:  Hypertension (Pt is here for bp follow up)        7/24/2024     1:52 PM   Additional Questions   Roomed by Yelena   Accompanied by self     History of Present Illness       Hypertension: He presents for follow up of hypertension.  He does check blood pressure  regularly outside of the clinic. Outpatient blood pressures have not been over 140/90. He does not follow a low salt diet.     He eats 2-3 servings of fruits and vegetables daily.He consumes 0 sweetened beverage(s) daily.He exercises with enough effort to increase his heart rate 9 or less minutes per day.  He exercises with enough effort to increase his heart rate 3 or less days per week.   He is taking medications regularly.         Diabetes Follow-up    How often are you checking your blood sugar? Not at all  What concerns do you have today about your diabetes? None   Do you have any of these symptoms? (Select all that apply)  No numbness or tingling in " "feet.  No redness, sores or blisters on feet.  No complaints of excessive thirst.  No reports of blurry vision.  No significant changes to weight.  Have you had a diabetic eye exam in the last 12 months? No        BP Readings from Last 2 Encounters:   07/24/24 123/81   07/15/24 (!) 160/100     Hemoglobin A1C (%)   Date Value   06/04/2024 6.8 (H)   10/16/2023 6.3 (H)     LDL Cholesterol Calculated (mg/dL)   Date Value   10/16/2023 48   08/30/2022 50           Objective    /81 (BP Location: Right arm, Patient Position: Sitting, Cuff Size: Adult Regular)   Pulse 78   Temp 97.9  F (36.6  C) (Temporal)   Resp 16   Ht 1.6 m (5' 2.99\")   Wt 75.8 kg (167 lb)   SpO2 99%   BMI 29.59 kg/m    Body mass index is 29.59 kg/m .  Physical Exam   GENERAL: alert, not distressed  CHEST: clear, no rales, rhonchi, or wheezes  CARDIAC: regular without murmur, gallop, or rub  ABDOMEN: soft, non tender, non distended, normal bowel sounds      No results found for any visits on 07/24/24.        Signed Electronically by: Edison Banda MD    "

## 2024-07-24 NOTE — TELEPHONE ENCOUNTER
New Medication Request    Contacts       Contact Date/Time Type Contact Phone/Fax    07/24/2024 05:33 PM CDT Phone (Incoming) [facility]/pharmacy #[id number] - [place] - [address] (Pharmacy) [phone]            What medication are you requesting?: Microlet Lancets MISC     Reason for medication request: PHARMACY STATING MEDICARE REQUIRES TO SPECIFY HOW MANY TIMES THESE ARE BEING USED A DAY        Controlled Substance Agreement on file:   CSA -- Patient Level:    CSA: None found at the patient level.         Patient offered an appointment? WAS SEEN ON 7/24    Preferred Pharmacy:     [facility]/pharmacy #[id number] EUGENIE BRADFORD  [address] [id number] [facility] [address]  Phone: [phone] Fax: [phone]

## 2024-08-20 DIAGNOSIS — I10 ESSENTIAL HYPERTENSION: ICD-10-CM

## 2024-08-20 DIAGNOSIS — R80.9 TYPE 2 DIABETES MELLITUS WITH MICROALBUMINURIA, WITHOUT LONG-TERM CURRENT USE OF INSULIN (H): ICD-10-CM

## 2024-08-20 DIAGNOSIS — E11.29 TYPE 2 DIABETES MELLITUS WITH MICROALBUMINURIA, WITHOUT LONG-TERM CURRENT USE OF INSULIN (H): ICD-10-CM

## 2024-08-20 RX ORDER — GLIPIZIDE 5 MG/1
10 TABLET ORAL 2 TIMES DAILY WITH MEALS
Qty: 360 TABLET | Refills: 2 | Status: SHIPPED | OUTPATIENT
Start: 2024-08-20

## 2024-08-20 RX ORDER — LISINOPRIL 20 MG/1
20 TABLET ORAL DAILY
Qty: 90 TABLET | Refills: 2 | Status: SHIPPED | OUTPATIENT
Start: 2024-08-20

## 2024-08-23 ENCOUNTER — TRANSFERRED RECORDS (OUTPATIENT)
Dept: HEALTH INFORMATION MANAGEMENT | Facility: CLINIC | Age: 79
End: 2024-08-23

## 2024-09-11 NOTE — LETTER
"Patient attempting to get out of bed and is undirectable and verbally aggressive at this time. Pt states \"we are holding her hostage\" and swatting at staff when trying to get her back into bed. Posie belt placed on patient for safety. Provider aware.        Ana Lorenzo RN  09/10/24 [id number]    " June 6, 2024      Vashti King  [address] [place] [address]      Dear ,    We are writing to inform you of your test results.    Diabetes control is good.  We can keep your medicines the same.  Resulted Orders   HEMOGLOBIN A1C   Result Value Ref Range    Hemoglobin A1C 6.8 (H) 0.0 - 5.6 %      Comment:      Normal <5.7%   Prediabetes 5.7-6.4%    Diabetes 6.5% or higher     Note: Adopted from ADA consensus guidelines.   Basic metabolic panel  (Ca, Cl, CO2, Creat, Gluc, K, Na, BUN)   Result Value Ref Range    Sodium 142 135 - 145 mmol/L      Comment:      Reference intervals for this test were updated on 09/26/2023 to more accurately reflect our healthy population. There may be differences in the flagging of prior results with similar values performed with this method. Interpretation of those prior results can be made in the context of the updated reference intervals.     Potassium 4.2 3.4 - 5.3 mmol/L    Chloride 104 98 - 107 mmol/L    Carbon Dioxide (CO2) 27 22 - 29 mmol/L    Anion Gap 11 7 - 15 mmol/L    Urea Nitrogen 14.7 8.0 - 23.0 mg/dL    Creatinine 1.12 0.67 - 1.17 mg/dL    GFR Estimate 68 >60 mL/min/1.73m2    Calcium 9.8 8.8 - 10.2 mg/dL    Glucose 106 (H) 70 - 99 mg/dL     If you have any questions or concerns, please call the clinic at the number listed above.     Sincerely,      Edison Banda MD

## 2024-09-15 DIAGNOSIS — E11.29 TYPE 2 DIABETES MELLITUS WITH MICROALBUMINURIA, WITHOUT LONG-TERM CURRENT USE OF INSULIN (H): ICD-10-CM

## 2024-09-15 DIAGNOSIS — G62.9 POLYNEUROPATHY: ICD-10-CM

## 2024-09-15 DIAGNOSIS — R80.9 TYPE 2 DIABETES MELLITUS WITH MICROALBUMINURIA, WITHOUT LONG-TERM CURRENT USE OF INSULIN (H): ICD-10-CM

## 2024-09-15 RX ORDER — GABAPENTIN 100 MG/1
CAPSULE ORAL
Qty: 450 CAPSULE | Refills: 0 | Status: SHIPPED | OUTPATIENT
Start: 2024-09-15

## 2024-10-03 ENCOUNTER — APPOINTMENT (OUTPATIENT)
Dept: INTERPRETER SERVICES | Facility: CLINIC | Age: 79
End: 2024-10-03
Payer: MEDICARE

## 2024-10-13 DIAGNOSIS — I10 ESSENTIAL HYPERTENSION: ICD-10-CM

## 2024-10-14 RX ORDER — DILTIAZEM HYDROCHLORIDE 240 MG/1
CAPSULE, COATED, EXTENDED RELEASE ORAL
Qty: 90 CAPSULE | Refills: 2 | Status: SHIPPED | OUTPATIENT
Start: 2024-10-14

## 2024-10-14 RX ORDER — CARVEDILOL 25 MG/1
25 TABLET ORAL 2 TIMES DAILY WITH MEALS
Qty: 180 TABLET | Refills: 1 | Status: SHIPPED | OUTPATIENT
Start: 2024-10-14

## 2024-10-17 DIAGNOSIS — G62.9 POLYNEUROPATHY: ICD-10-CM

## 2024-10-18 RX ORDER — ACETAMINOPHEN 325 MG/1
325-650 TABLET ORAL EVERY 8 HOURS PRN
Qty: 180 TABLET | Refills: 0 | Status: SHIPPED | OUTPATIENT
Start: 2024-10-18

## 2024-11-19 ENCOUNTER — OFFICE VISIT (OUTPATIENT)
Dept: FAMILY MEDICINE | Facility: CLINIC | Age: 79
End: 2024-11-19
Payer: MEDICARE

## 2024-11-19 VITALS
OXYGEN SATURATION: 97 % | SYSTOLIC BLOOD PRESSURE: 126 MMHG | DIASTOLIC BLOOD PRESSURE: 74 MMHG | TEMPERATURE: 97.4 F | HEART RATE: 62 BPM | BODY MASS INDEX: 29.37 KG/M2 | RESPIRATION RATE: 16 BRPM | HEIGHT: 64 IN | WEIGHT: 172 LBS

## 2024-11-19 DIAGNOSIS — G62.9 POLYNEUROPATHY: ICD-10-CM

## 2024-11-19 DIAGNOSIS — N39.41 URGE INCONTINENCE OF URINE: ICD-10-CM

## 2024-11-19 DIAGNOSIS — E11.29 TYPE 2 DIABETES MELLITUS WITH MICROALBUMINURIA, WITHOUT LONG-TERM CURRENT USE OF INSULIN (H): ICD-10-CM

## 2024-11-19 DIAGNOSIS — Z00.00 ENCOUNTER FOR MEDICARE ANNUAL WELLNESS EXAM: Primary | ICD-10-CM

## 2024-11-19 DIAGNOSIS — E78.5 DYSLIPIDEMIA: ICD-10-CM

## 2024-11-19 DIAGNOSIS — I10 ESSENTIAL HYPERTENSION: ICD-10-CM

## 2024-11-19 DIAGNOSIS — E87.1 HYPONATREMIA: ICD-10-CM

## 2024-11-19 DIAGNOSIS — R80.9 TYPE 2 DIABETES MELLITUS WITH MICROALBUMINURIA, WITHOUT LONG-TERM CURRENT USE OF INSULIN (H): ICD-10-CM

## 2024-11-19 DIAGNOSIS — M54.16 LUMBAR BACK PAIN WITH RADICULOPATHY AFFECTING LOWER EXTREMITY: ICD-10-CM

## 2024-11-19 DIAGNOSIS — H91.90 HEARING LOSS, UNSPECIFIED HEARING LOSS TYPE, UNSPECIFIED LATERALITY: ICD-10-CM

## 2024-11-19 LAB
EST. AVERAGE GLUCOSE BLD GHB EST-MCNC: 140 MG/DL
FOLATE SERPL-MCNC: 14.9 NG/ML (ref 4.6–34.8)
HBA1C MFR BLD: 6.5 % (ref 0–5.6)

## 2024-11-19 PROCEDURE — 36415 COLL VENOUS BLD VENIPUNCTURE: CPT | Performed by: FAMILY MEDICINE

## 2024-11-19 PROCEDURE — T1013 SIGN LANG/ORAL INTERPRETER: HCPCS | Mod: U4 | Performed by: INTERPRETER

## 2024-11-19 PROCEDURE — 83036 HEMOGLOBIN GLYCOSYLATED A1C: CPT | Performed by: FAMILY MEDICINE

## 2024-11-19 PROCEDURE — 82746 ASSAY OF FOLIC ACID SERUM: CPT | Performed by: FAMILY MEDICINE

## 2024-11-19 RX ORDER — GABAPENTIN 100 MG/1
CAPSULE ORAL
Qty: 450 CAPSULE | Refills: 0 | Status: CANCELLED | OUTPATIENT
Start: 2024-11-19

## 2024-11-19 RX ORDER — MULTIVITAMIN
1 TABLET ORAL DAILY
Qty: 90 TABLET | Refills: 4 | Status: SHIPPED | OUTPATIENT
Start: 2024-11-19

## 2024-11-19 RX ORDER — ASPIRIN 81 MG/1
81 TABLET ORAL DAILY
Qty: 90 TABLET | Refills: 2 | Status: SHIPPED | OUTPATIENT
Start: 2024-11-19

## 2024-11-19 RX ORDER — MIRABEGRON 25 MG/1
25 TABLET, FILM COATED, EXTENDED RELEASE ORAL DAILY
Qty: 90 TABLET | Refills: 0 | Status: SHIPPED | OUTPATIENT
Start: 2024-11-19

## 2024-11-19 RX ORDER — GLIPIZIDE 5 MG/1
10 TABLET ORAL 2 TIMES DAILY WITH MEALS
Qty: 360 TABLET | Refills: 2 | Status: CANCELLED | OUTPATIENT
Start: 2024-11-19

## 2024-11-19 RX ORDER — DILTIAZEM HYDROCHLORIDE 240 MG/1
240 CAPSULE, COATED, EXTENDED RELEASE ORAL DAILY
Qty: 90 CAPSULE | Refills: 2 | Status: CANCELLED | OUTPATIENT
Start: 2024-11-19

## 2024-11-19 RX ORDER — ATORVASTATIN CALCIUM 40 MG/1
40 TABLET, FILM COATED ORAL DAILY
Qty: 90 TABLET | Refills: 3 | Status: SHIPPED | OUTPATIENT
Start: 2024-11-19

## 2024-11-19 RX ORDER — ASPIRIN 81 MG/1
81 TABLET ORAL DAILY
Qty: 90 TABLET | Refills: 0 | Status: CANCELLED | OUTPATIENT
Start: 2024-11-19

## 2024-11-19 RX ORDER — GABAPENTIN 100 MG/1
CAPSULE ORAL
Qty: 450 CAPSULE | Refills: 2 | Status: SHIPPED | OUTPATIENT
Start: 2024-11-19

## 2024-11-19 RX ORDER — ATORVASTATIN CALCIUM 40 MG/1
40 TABLET, FILM COATED ORAL DAILY
Qty: 90 TABLET | Refills: 2 | Status: CANCELLED | OUTPATIENT
Start: 2024-11-19

## 2024-11-19 RX ORDER — METFORMIN HYDROCHLORIDE 500 MG/1
1000 TABLET, EXTENDED RELEASE ORAL 2 TIMES DAILY
Qty: 360 TABLET | Refills: 2 | Status: SHIPPED | OUTPATIENT
Start: 2024-11-19

## 2024-11-19 RX ORDER — CARVEDILOL 25 MG/1
25 TABLET ORAL 2 TIMES DAILY WITH MEALS
Qty: 180 TABLET | Refills: 1 | Status: CANCELLED | OUTPATIENT
Start: 2024-11-19

## 2024-11-19 RX ORDER — FUROSEMIDE 20 MG/1
20 TABLET ORAL DAILY
Qty: 90 TABLET | Refills: 3 | Status: CANCELLED | OUTPATIENT
Start: 2024-11-19

## 2024-11-19 RX ORDER — ACETAMINOPHEN 325 MG/1
325-650 TABLET ORAL EVERY 8 HOURS PRN
Qty: 180 TABLET | Refills: 0 | Status: SHIPPED | OUTPATIENT
Start: 2024-11-19

## 2024-11-19 SDOH — HEALTH STABILITY: PHYSICAL HEALTH: ON AVERAGE, HOW MANY DAYS PER WEEK DO YOU ENGAGE IN MODERATE TO STRENUOUS EXERCISE (LIKE A BRISK WALK)?: 7 DAYS

## 2024-11-19 SDOH — HEALTH STABILITY: PHYSICAL HEALTH: ON AVERAGE, HOW MANY MINUTES DO YOU ENGAGE IN EXERCISE AT THIS LEVEL?: 30 MIN

## 2024-11-19 ASSESSMENT — SOCIAL DETERMINANTS OF HEALTH (SDOH): HOW OFTEN DO YOU GET TOGETHER WITH FRIENDS OR RELATIVES?: NEVER

## 2024-11-19 NOTE — PROGRESS NOTES
Preventive Care Visit  [facility]  Edison Banda MD, Family Medicine  Nov 19, 2024      Assessment & Plan     Encounter for Medicare annual wellness exam    Urge incontinence of urine  Trial new med.  Discussed possible side effects urine retention, increased BP  - mirabegron (MYRBETRIQ) 25 MG 24 hr tablet  Dispense: 90 tablet; Refill: 0    Type 2 diabetes mellitus with microalbuminuria, without long-term current use of insulin (H)  Current meds glipizide, invokana, metformin  - Lipid panel reflex to direct LDL Non-fasting  - Albumin Random Urine Quantitative with Creat Ratio  - CONTOUR NEXT TEST test strip  Dispense: 200 strip; Refill: 5  - Basic metabolic panel  (Ca, Cl, CO2, Creat, Gluc, K, Na, BUN)  - Hemoglobin A1c  - TSH with free T4 reflex  - multivitamin (ONE-DAILY) tablet  Dispense: 90 tablet; Refill: 4  - blood glucose (CONTOUR NEXT TEST) test strip  Dispense: 100 strip; Refill: 2  - aspirin (ASPIRIN LOW DOSE) 81 MG EC tablet  Dispense: 90 tablet; Refill: 2    Hearing loss, unspecified hearing loss type, unspecified laterality  Refer to audiology for hearing/hearing aids  - Adult Audiology [facility] Referral    Polyneuropathy  Recheck standard labs  - acetaminophen (TYLENOL) 325 MG tablet  Dispense: 180 tablet; Refill: 0  - Vitamin B12  - Folate  - gabapentin (NEURONTIN) 100 MG capsule  Dispense: 450 capsule; Refill: 2    Dyslipidemia  continue  - atorvastatin (LIPITOR) 40 MG tablet  Dispense: 90 tablet; Refill: 3    Essential hypertension  Controlled  Check lytes  Continue carvedilol, lisinopril, (off hydrochlorothiazide due to low sodium)  Monitor with addition of bladder med mirabegron    Lumbar back pain with radiculopathy affecting lower extremity  Topical meds as needed  - diclofenac (VOLTAREN) 1 % topical gel  Dispense: 300 g; Refill: 0    Hyponatremia  Continue salt tabs.  Check lytes.    Flu and covid vaccines today        BMI  Estimated body mass index is 29.73 kg/m  as  "calculated from the following:    Height as of this encounter: 1.62 m (5' 3.78\").    Weight as of this encounter: 78 kg (172 lb).       Moris GARZA is a 78 year old, presenting for the following:  Physical and Refill Request (med)        11/19/2024     1:16 PM   Additional Questions   Roomed by arsalan brito   Accompanied by self           HPI  No concerns other than energy is generally low  Wants vitamins to help combat this.    Hearing aid are over 20 years old.  Would be ok with new ones if insurance had benefit for them  Cost is a concern.    Lives with wife.  Rakes leaves and does work like that.  Children come to help.  They are doing ok with current living situation.    Still some numbness in hands and feet.  On gabapentin.    Has some urine leakage.  Would like to try to address  Had a medicine before from [facility] doctor that helped.  Feels like he needs to urinate, then by the time he can get to a bathroom, he is leaking.    Health Care Directive  Patient does not have a Health Care Directive: Discussed advance care planning with patient; however, patient declined at this time.  Sons Grover and Renay cited as people to help make decisions if he was sick and could not make them on his own.        11/19/2024   General Health   How would you rate your overall physical health? Good   Feel stress (tense, anxious, or unable to sleep) Not at all            11/19/2024   Nutrition   Diet: Regular (no restrictions)            11/19/2024   Exercise   Days per week of moderate/strenous exercise 7 days   Average minutes spent exercising at this level 30 min            11/19/2024   Social Factors   Frequency of gathering with friends or relatives Never   Worry food won't last until get money to buy more Yes   Food not last or not have enough money for food? Yes   Do you have housing? (Housing is defined as stable permanent housing and does not include staying ouside in a car, in a tent, in an abandoned building, in " an overnight shelter, or couch-surfing.) Yes   Are you worried about losing your housing? Yes   Lack of transportation? No   Unable to get utilities (heat,electricity)? No   Want help with housing or utility concern? (!) YES      (!) FOOD SECURITY CONCERN PRESENT(!) HOUSING CONCERN PRESENT(!) SOCIAL CONNECTIONS CONCERN        11/19/2024   Fall Risk   Fallen 2 or more times in the past year? No    Trouble with walking or balance? No        Patient-reported          11/19/2024   Activities of Daily Living- Home Safety   Needs help with the following daily activites None of the above   Safety concerns in the home None of the above            11/19/2024   Dental   Dentist two times every year? (!) NO            11/19/2024   Hearing Screening   Hearing concerns? (!) I FEEL THAT PEOPLE ARE MUMBLING OR NOT SPEAKING CLEARLY.    (!) I NEED TO ASK PEOPLE TO SPEAK UP OR REPEAT THEMSELVES.       Multiple values from one day are sorted in reverse-chronological order         11/19/2024   Driving Risk Screening   Patient/family members have concerns about driving No            11/19/2024   General Alertness/Fatigue Screening   Have you been more tired than usual lately? (!) YES            11/19/2024   Urinary Incontinence Screening   Bothered by leaking urine in past 6 months Yes               Today's PHQ-2 Score:       11/19/2024     1:31 PM   PHQ-2 ( 1999 [employer])   Q1: Little interest or pleasure in doing things 0    Q2: Feeling down, depressed or hopeless 1    PHQ-2 Score 1    Q1: Little interest or pleasure in doing things Not at all   Q2: Feeling down, depressed or hopeless Several days   PHQ-2 Score 1       Patient-reported           11/19/2024   Substance Use   Alcohol more than 3/day or more than 7/wk No   Do you have a current opioid prescription? No   How severe/bad is pain from 1 to 10? 7/10   Do you use any other substances recreationally? No        Social History     Tobacco Use    Smoking status: Never     Passive  exposure: Never    Smokeless tobacco: Never   Vaping Use    Vaping status: Never Used   Substance Use Topics    Drug use: No       ASCVD Risk   The ASCVD Risk score (Biju CHOW, et al., 2019) failed to calculate for the following reasons:    The valid total cholesterol range is 130 to 320 mg/dL        Reviewed and updated as needed this visit by Provider   Tobacco     Med Hx  Surg Hx  Fam Hx            Past Medical History:   Diagnosis Date    BPH (benign prostatic hyperplasia)     Diabetes mellitus (H)     Diabetes mellitus, type II (H)     High cholesterol     Hyperlipidemia     Hypertension      Past Surgical History:   Procedure Laterality Date    PICC DOUBLE LUMEN PLACEMENT  5/10/2023          BP Readings from Last 3 Encounters:   11/19/24 126/74   07/24/24 123/81   07/15/24 (!) 160/100    Wt Readings from Last 3 Encounters:   11/19/24 78 kg (172 lb)   07/24/24 75.8 kg (167 lb)   06/04/24 77.6 kg (171 lb)                  Patient Active Problem List   Diagnosis    Essential hypertension    Environmental allergies    Hyperlipidemia    BPH (benign prostatic hyperplasia)    Exertional dyspnea    Microalbuminuria    Type 2 diabetes mellitus with microalbuminuria, without long-term current use of insulin (H)    Hyponatremia    Polyneuropathy associated with underlying disease (H)    Physical deconditioning     Past Surgical History:   Procedure Laterality Date    PICC DOUBLE LUMEN PLACEMENT  5/10/2023            Social History     Tobacco Use    Smoking status: Never     Passive exposure: Never    Smokeless tobacco: Never   Substance Use Topics    Alcohol use: Not on file     Family History   Problem Relation Age of Onset    Diabetes Sister     Hypertension Sister     Diabetes Brother     Hypertension Brother     Cerebrovascular Disease Brother          Current Outpatient Medications   Medication Sig Dispense Refill    acetaminophen (TYLENOL) 325 MG tablet Take 1-2 tablets (325-650 mg) by mouth every 8  "hours as needed for mild pain. 180 tablet 0    aspirin (ASPIRIN LOW DOSE) 81 MG EC tablet Take 1 tablet (81 mg) by mouth daily. 90 tablet 2    atorvastatin (LIPITOR) 40 MG tablet Take 1 tablet (40 mg) by mouth daily. 90 tablet 3    blood glucose (CONTOUR NEXT TEST) test strip Use to test blood sugar 2 times daily or as directed. 100 strip 2    carvedilol (COREG) 25 MG tablet Take 1 tablet (25 mg) by mouth 2 times daily (with meals). 180 tablet 1    CONTOUR NEXT TEST test strip Use to test blood sugar 1 time daily. 200 strip 5    diclofenac (VOLTAREN) 1 % topical gel Apply 2 g topically 3 times daily. To low back 300 g 0    diltiazem ER COATED BEADS (CARDIZEM CD/CARTIA XT) 240 MG 24 hr capsule TAKE 1 CAPSULE BY MOUTH EVERY DAY 90 capsule 2    furosemide (LASIX) 20 MG tablet TAKE 1 TABLET BY MOUTH EVERY DAY 90 tablet 3    gabapentin (NEURONTIN) 100 MG capsule Take 2 capsules (200 mg) by mouth daily AND 3 capsules (300 mg) at bedtime. 450 capsule 2    generic lancets (FINGERSTIX LANCETS) [GENERIC LANCETS (FINGERSTIX LANCETS)] Check blood sugar twice daily.  Uses Insulin.  Diagnosis E11.9 100 each 1    glipiZIDE (GLUCOTROL) 5 MG tablet TAKE 2 TABLETS BY MOUTH TWICE A DAY WITH MEALS 360 tablet 2    INVOKANA 100 MG tablet TAKE 1 TAB IN THE MORNING BEFORE BREAKFAST 90 tablet 0    lisinopril (ZESTRIL) 20 MG tablet TAKE 1 TABLET BY MOUTH EVERY DAY 90 tablet 2    melatonin 1 MG TABS tablet Take 1 tablet (1 mg) by mouth At Bedtime 30 tablet 0    Microlet Lancets MISC Use to test blood sugar 2-3 daily. 100 each 4    mirabegron (MYRBETRIQ) 25 MG 24 hr tablet Take 1 tablet (25 mg) by mouth daily. 90 tablet 0    multivitamin (ONE-DAILY) tablet Take 1 tablet by mouth daily. 90 tablet 4    pen needle, diabetic 32 gauge x 1/4\" Ndle [PEN NEEDLE, DIABETIC 32 GAUGE X 1/4\" NDLE] Use as needed with insulin 100 each 1    polyethylene glycol (MIRALAX) 17 GM/Dose powder Take 17 g by mouth daily 510 g 0    potassium chloride ER (K-TAB) 20 MEQ " CR tablet TAKE 1 TABLET BY MOUTH DAILY 90 tablet 0    senna-docusate (SENEXON-S) 8.6-50 MG tablet Take 1 tablet by mouth 2 times daily 60 tablet 5    sodium chloride 1 GM tablet TAKE 2 TABLETS (2 G) BY MOUTH DAILY 60 tablet 0    metFORMIN (GLUCOPHAGE XR) 500 MG 24 hr tablet TAKE 2 TABLETS (1,000 MG) BY MOUTH 2 TIMES DAILY FOR 90 DAYS TAKE WITH FOOD. 360 tablet 0     Allergies   Allergen Reactions    Amlodipine Swelling     Swelling in legs     Hydrochlorothiazide Other (See Comments)     hyponatremia     Recent Labs   Lab Test 06/04/24  0906 10/16/23  1653 07/05/23  1059 05/10/23  1004 05/10/23  0529 05/09/23  1821 05/09/23  0847 05/06/23  1856 12/06/22  0951 12/06/22  0950 08/30/22  0910 04/25/22  1424 09/20/21  1102 06/21/21  1024 01/22/21  0835 10/05/20  0832   A1C 6.8* 6.3* 6.9*  --   --   --  7.9*  --   --    < > 8.7*   < > 7.7* 7.9*   < > 7.8*   LDL  --  48  --   --   --   --   --   --   --   --  50  --  56  --   --  54   HDL  --  50  --   --   --   --   --   --   --   --  38*  --  40  --   --  43   TRIG  --  63  --   --   --   --   --   --   --   --  152*  --  166*  --   --  170*   ALT  --   --   --   --  19  --  21  --  23  --  26  --  25  --   --  28   CR 1.12 0.92 0.95   < > 0.81   < > 0.78 1.01 1.41*  --  1.34*   < > 1.31* 1.10  --  1.15   GFRESTIMATED 68 87 83   < > >90   < > >90 78 52*  --  56*   < > 54* >60  --  >60   GFRESTBLACK  --   --   --   --   --   --   --   --   --   --   --   --   --  >60  --  >60   POTASSIUM 4.2 4.5 4.7   < > 3.7   < > 4.0 4.4 4.9  --  4.5   < > 4.1 4.1  --  4.2   TSH  --  0.83  --   --   --   --   --  1.26  --   --   --   --   --   --   --   --     < > = values in this interval not displayed.      Current providers sharing in care for this patient include:  Patient Care Team:  Edison Banda MD as PCP - General (Family Medicine)  Edison Banad MD as Assigned PCP    The following health maintenance items are reviewed in Epic and correct as of today:  Health Maintenance  "  Topic Date Due    DIABETIC FOOT EXAM  08/16/2024    LIPID  10/16/2024    MICROALBUMIN  10/16/2024    A1C  12/04/2024    BMP  06/04/2025    EYE EXAM  08/23/2025    MEDICARE ANNUAL WELLNESS VISIT  11/19/2025    ANNUAL REVIEW OF HM ORDERS  11/19/2025    FALL RISK ASSESSMENT  11/19/2025    COLORECTAL CANCER SCREENING  11/29/2026    ADVANCE CARE PLANNING  11/19/2029    DTAP/TDAP/TD IMMUNIZATION (3 - Td or Tdap) 06/21/2031    HEPATITIS C SCREENING  Completed    PHQ-2 (once per calendar year)  Completed    INFLUENZA VACCINE  Completed    Pneumococcal Vaccine: 65+ Years  Completed    ZOSTER IMMUNIZATION  Completed    RSV VACCINE  Completed    COVID-19 Vaccine  Completed    HPV IMMUNIZATION  Aged Out    MENINGITIS IMMUNIZATION  Aged Out    RSV MONOCLONAL ANTIBODY  Aged Out          Objective    Exam  /74 (BP Location: Left arm, Patient Position: Sitting, Cuff Size: Adult Regular)   Pulse 62   Temp 97.4  F (36.3  C) (Temporal)   Resp 16   Ht 1.62 m (5' 3.78\")   Wt 78 kg (172 lb)   SpO2 97%   BMI 29.73 kg/m     Estimated body mass index is 29.73 kg/m  as calculated from the following:    Height as of this encounter: 1.62 m (5' 3.78\").    Weight as of this encounter: 78 kg (172 lb).    Physical Exam  GENERAL: alert, not distressed  CHEST: clear, no rales, rhonchi, or wheezes  CARDIAC: regular without murmur, gallop, or rub  ABDOMEN: soft, non tender, non distended, normal bowel sounds          11/19/2024   Mini Cog   Clock Draw Score 2 Normal   3 Item Recall 0 objects recalled   Mini Cog Total Score 2                9/20/2021   Vision Screen   Reason Vision Screen Not Completed Patient has seen eye doctor in the past 12 months          Signed Electronically by: Edison Banda MD Prior to immunization administration, verified patients identity using patient s name and date of birth. Please see Immunization Activity for additional information.     Screening Questionnaire for Adult Immunization    Are you sick " today?   No   Do you have allergies to medications, food, a vaccine component or latex?   No   Have you ever had a serious reaction after receiving a vaccination?   No   Do you have a long-term health problem with heart, lung, kidney, or metabolic disease (e.g., diabetes), asthma, a blood disorder, no spleen, complement component deficiency, a cochlear implant, or a spinal fluid leak?  Are you on long-term aspirin therapy?   Yes   Do you have cancer, leukemia, HIV/AIDS, or any other immune system problem?   No   Do you have a parent, brother, or sister with an immune system problem?   No   In the past 3 months, have you taken medications that affect  your immune system, such as prednisone, other steroids, or anticancer drugs; drugs for the treatment of rheumatoid arthritis, Crohn s disease, or psoriasis; or have you had radiation treatments?   No   Have you had a seizure, or a brain or other nervous system problem?   No   During the past year, have you received a transfusion of blood or blood    products, or been given immune (gamma) globulin or antiviral drug?   No   For women: Are you pregnant or is there a chance you could become       pregnant during the next month?   No   Have you received any vaccinations in the past 4 weeks?   No     Immunization questionnaire was positive for at least one answer.  Notified       Patient instructed to remain in clinic for 15 minutes afterwards, and to report any adverse reactions.     Screening performed by Basilio Siegel MA on 11/19/2024 at 1:39 PM.

## 2024-11-19 NOTE — PATIENT INSTRUCTIONS
"Patient Education   Rachel Carlos Xeeb Saib Xyuas Kom Tiv Thaiv Tus Kheej  Nov yog ib co rachel carlos xeeb uas peb yeej meem muab darryn tib neeg pab lawv noj qab nyob zoo. Scott zaum koj pab pawg saib xyuas yuav muaj ib co rachel carlos xeeb uas tshwj xeeb darryn koj nkaus xwb. Thov nrog koj pab pawg saib xyuas sib john txog scott yam uas koj toob arlene kom tiv thaiv koj tus kheej.  Tammy Coj Lub Neej  Es xaws xais ntau samir 150 feeb txhua lub segundo tiam (30 feeb txhua hnub, 5 hnub ib lub segundo tiam).  Ua yam pab cov leeg muaj zog tuaj 2 zaug txhua lub segundo tiam. Scott yam no pab koj tswj hwm koj lub cev qhov hnyav thiab tiv thaiv ntawm kab mob.  Txhob haus luam yeeb.  Pleev tshuaj tiv thaiv tshav kub kom thiaj tsis raug mob khees xaws ntawm nqaij tawv.  Txhua 2 mus darryn 7 xyoos yuav tau kuaj koj lub tsev darryn qhov radon. Radon yog ib nickolas balbir uas tsis muaj xim tsis muaj ntxhiab uas mob tau koj cov ntsws. Kom thiaj kawm ntxiv, mus saib [email or web address] thiab ntaus ntawv \"Radon in Homes.\"  Ceev cov phom kom tsis muaj mos txwv darryn hauv thiab muab xauv yusef hauv ib qho chaw nyab xeeb xws li lub txhoj, los yog muab xauv yusef thiab muab cov yawm sij zais yusef. Muab cov mos txwv xauv darryn hauv lwm qhov chaw nrug cov phom. Kom thiaj kawm ntxiv, mus saib [email or web address] thiab ntaus ntawv \"safe gun storage.\"  Tammy Noj Qab Huv  Noj 5 qho txiv hmab txiv ntoo thiab zaub txhua hnub.  Noj nplem nplej, txhuv xim av thiab cov fawm muaj nplej (los theej nplem dawb, txhuv dawb, thiab fawm dawb).  Ua tib zoo noj calcium thiab vitamin D ntau. Saib cov ntawv lo darryn pob khoom noj thiab siv zog noj kom txog 100% RDA (qhov ntau hauv ib hnub).  Yeej meem kuaj ntsuas  Txhua 6 lub hli mus kuaj hniav thiab muab tu.  Txhua xyoo mus saib koj pab pawg saib xyuas tammy noj qab nyob zoo kom sib john txog:  Ib yam dab tsi uas hloov ntawm koj txoj tammy noj qab nyob zoo.  Cov tshuaj uas koj pab pawg tau hais kom siv.  Tammy saib xyuas kom tiv thaiv, tammy npaj darryn tsev neeg, thiab yuav ua li lico tiv " thaiv ntawm kab mob uas ntev.  Tammy txhaj tshuaj (koob tshuaj tiv thaiv)   Cov koob tshuaj HPV (txog thaum muaj 28 xyoo), yog koj yeej tsis tau txais samir li.  Cov koob tshuaj Hepatitis B Kab Mob Siab (txog thaum muaj 61 xyoos), yog koj yeej tsis tau txais samir li.  Koob tshuaj COVID-19: Txais koob tshuaj no thaum txog caij txais.  Koob tshuaj tiv thaiv ntawm mob khaub thuas: Txais txhua xyoo.  Koob tshuaj tiv thaiv mob daig tsaig: Txais txhua 12 xyoo.  Pneumococcal, hepatitis A, thiab RSV cov koob tshuaj: Nug koj pab pawg saib xyuas claudia puas tsim nyog darryn koj txais cov no raws li koj qhov pheej hmoo.  Koob tshuaj tiv thaiv ntawm kab mob sawv hlwv (darryn cov muaj 52 xyoo rov aleks).  Tammy kuaj ntsuas darryn tammy noj qab nyob zoo  Kuaj mob ntshav qab zib:  Pib thaum muaj 37 xyoos, Mus kuaj mob ntshav qab zib txhua 3 xyoos los yog ntau zog.  Yog koj tseem tsis tau txog 37 xyoos, nug koj pab pawg saib xyuas claudia puas tsim nyog darryn koj kuaj mob ntshav qab zib.  Kuaj cholesterol: Thaum muaj 41 xyoo, pib kuaj cholesterol txhua 7 xyoos, los yog ntau samir ntawd yog kws jeremiah mob qhia.  Kuaj txha qhov tuab (DEXA): Thaum txog 52 xyoo lawd, nug koj pab pawg saib xyuas claudia puas tsim nyog darryn koj kuaj txha claudia puas ruaj.  Kab Mob Siab Hepatitis C: Kuaj ib zaug hauv koj lub neej.  Kuaj Txoj Hlab Ntshav Hauv Plab: Nrog koj tus kws jeremiah mob john txog tammy kuaj ntsuas no yog koj:  Tau haus luam yeeb ib zaug li; thiab  Yog txiv neej; thiab  Nruab hnub nyoog 67 thiab 77.  Mob Arlene Cees (kis mob dhau ntawm tammy sib deev)  Ua ntej muaj 26 xyoos: Nug koj pab pawg saib xyuas claudia puas tsim nyog kuaj darryn mob arlene cees.  Andreas qab muaj 26 xyoos: Mus kuaj darryn mob arlene cees yog koj muaj tammy pheej hmoo. Koj muaj tammy pheej hmoo darryn mob arlene cees (suav nrog HIV) yog:  Koj sib deev nrog ntau samir ib tug neeg.  Koj tsis siv cov hnab looj qau thaum sib deev.  Koj los yog koj tus khub deev kuaj pom tias muaj mob arlene cees lawm.  Yog koj muaj tammy pheej hmoo darryn mob arlene cees  HIV, nug txog cov tshuaj PrEP kom tiv thaiv ntawm HIV.  Mus kuaj darryn mob arlene cees HIV yam ntau samir ib zaug hauv koj lub neej, txawm yog koj muaj tammy pheej hmoo darryn mob arlene cees HIV los tsis muaj los xij.  Kuaj darryn mob khees xaws  Kuaj lub ncauj tsev me nyuam darryn mob khees xaws: Yog koj muaj ib lub ncauj tsev me nyuam, yelena yuav tau ib sij kuaj lub ncauj tsev me nyuam seb puas muaj mob khees xaws pib thaum muaj 23 xyoos. Neeg feem coob uas ib sij kuaj lub ncauj tsev me nyuam thiab tsis pom dab tsi txawv lawv tsum tau lauren qab muaj 67 xyoos. Nrog koj tus kws jeremiah mob sib john txog qhov no.  Kuaj lub mis darryn mob khees xaws (mammogram): Yog koj tau muaj mis samir li, yuav tau ib sij kuaj lub mis pib thaum muaj 42 xyoo. Tammy kuaj no yog kom saib seb puas muaj mob khees xaws hauv lub mis.  Kuaj Txoj Hnyuv Darryn Mob Khees Xaws: Yeej tseem ceeb pib kuaj txoj hnyuv darryn mob khees xaws pib thaum muaj 47 xyoos.  Txhua 12 xyoo yuav tau kuaj txoj hnyuv (los yog ntau zog yog koj muaj tammy pheej hmoo) Los sis, nug koj tus kws jeremiah mob txog tammy kuaj thooj quav FIT txhua xyoo los yog Cologuard txhua 5 xyoos.  Kom thiaj kawm ntxiv txog scott nickolas kuaj no, mus saib: [email or web address].  Yog xav tau tammy pab txog qhov no, mus saib: [email or web address].  Kuaj lub orion kua phev (prostate) darryn mob khees xaws: Yog koj muaj ib lub orion kua phev (prostate) thiab nruab hnub nyoog 57 mus darryn 71, nug koj tus kws jeremiah mob claudia puas tsim nyog darryn koj kuaj lub orion kua phev.  Kuaj ntsws darryn mob khees xaws: Yog koj haus luam yeeb mccord sim no los yog tau haus yav tas los uas muaj hnub nyoog nruab 50 xyoos mus darryn 82 xyoo, nug koj pab pawg saib xyuas claudia puas tsim nyog darryn koj yeej meem kuaj lub ntsws darryn mob khees xaws.    Darryn cov nickolas phiaj tammy siv ua ntaub ntawv qhia nkaus xwb. Yuav tsis pauv tammy qhia los ntawm koj qhov chaw jeremiah mob. Copyright (giuliano rothman)   2023 [facility].   Txhua txoj stephan raug tswj tseg lawm. Tshaj xyuas los ntawm [facility]. [employer] [id number] - REV 04/24.  Preventive Care Advice   This is general advice given by our system to help you stay healthy. However, your care team may have specific advice just for you. Please talk to your care team about your preventive care needs.  Nutrition  Eat 5 or more servings of fruits and vegetables each day.  Try wheat bread, brown rice and whole grain pasta (instead of white bread, rice, and pasta).  Get enough calcium and vitamin D. Check the label on foods and aim for 100% of the RDA (recommended daily allowance).  Lifestyle  Exercise at least 150 minutes each week  (30 minutes a day, 5 days a week).  Do muscle strengthening activities 2 days a week. These help control your weight and prevent disease.  No smoking.  Wear sunscreen to prevent skin cancer.  Have a dental exam and cleaning every 6 months.  Yearly exams  See your health care team every year to talk about:  Any changes in your health.  Any medicines your care team has prescribed.  Preventive care, family planning, and ways to prevent chronic diseases.  Shots (vaccines)   HPV shots (up to age 26), if you've never had them before.  Hepatitis B shots (up to age 59), if you've never had them before.  COVID-19 shot: Get this shot when it's due.  Flu shot: Get a flu shot every year.  Tetanus shot: Get a tetanus shot every 10 years.  Pneumococcal, hepatitis A, and RSV shots: Ask your care team if you need these based on your risk.  Shingles shot (for age 50 and up)  General health tests  Diabetes screening:  Starting at age 35, Get screened for diabetes at least every 3 years.  If you are younger than age 35, ask your care team if you should be screened for diabetes.  Cholesterol test: At age 39, start having a cholesterol test every 5 years, or more often if advised.  Bone density scan (DEXA): At age 50, ask your care team if you should have this scan for osteoporosis (brittle bones).  Hepatitis C: Get tested at  least once in your life.  STIs (sexually transmitted infections)  Before age 24: Ask your care team if you should be screened for STIs.  After age 24: Get screened for STIs if you're at risk. You are at risk for STIs (including HIV) if:  You are sexually active with more than one person.  You don't use condoms every time.  You or a partner was diagnosed with a sexually transmitted infection.  If you are at risk for HIV, ask about PrEP medicine to prevent HIV.  Get tested for HIV at least once in your life, whether you are at risk for HIV or not.  Cancer screening tests  Cervical cancer screening: If you have a cervix, begin getting regular cervical cancer screening tests starting at age 21.  Breast cancer scan (mammogram): If you've ever had breasts, begin having regular mammograms starting at age 40. This is a scan to check for breast cancer.  Colon cancer screening: It is important to start screening for colon cancer at age 45.  Have a colonoscopy test every 10 years (or more often if you're at risk) Or, ask your provider about stool tests like a FIT test every year or Cologuard test every 3 years.  To learn more about your testing options, visit:   .  For help making a decision, visit:   https://bit.ly/xn03945.  Prostate cancer screening test: If you have a prostate, ask your care team if a prostate cancer screening test (PSA) at age 55 is right for you.  Lung cancer screening: If you are a current or former smoker ages 50 to 80, ask your care team if ongoing lung cancer screenings are right for you.  For informational purposes only. Not to replace the advice of your health care provider. Copyright   2023 [facility] [employer]. All rights reserved. Clinically reviewed by the [facility]. [employer] [id number] - REV 01/24.  Hearing Loss: Care Instructions  Overview     Hearing loss is a sudden or slow decrease in how well you hear. It can range from slight to profound. Permanent hearing  loss can occur with aging. It also can happen when you are exposed long-term to loud noise. Examples include listening to loud music, riding motorcycles, or being around other loud machines.  Hearing loss can affect your work and home life. It can make you feel lonely or depressed. You may feel that you have lost your independence. But hearing aids and other devices can help you hear better and feel connected to others.  Follow-up care is a key part of your treatment and safety. Be sure to make and go to all appointments, and call your doctor if you are having problems. It's also a good idea to know your test results and keep a list of the medicines you take.  How can you care for yourself at home?  Avoid loud noises whenever possible. This helps keep your hearing from getting worse.  Always wear hearing protection around loud noises.  Wear a hearing aid as directed.  A professional can help you pick a hearing aid that will work best for you.  You can also get hearing aids over the counter for mild to moderate hearing loss.  Have hearing tests as your doctor suggests. They can show whether your hearing has changed. Your hearing aid may need to be adjusted.  Use other devices as needed. These may include:  Telephone amplifiers and hearing aids that can connect to a television, stereo, radio, or microphone.  Devices that use lights or vibrations. These alert you to the doorbell, a ringing telephone, or a baby monitor.  Television closed-captioning. This shows the words at the bottom of the screen. Most new TVs can do this.  TTY (text telephone). This lets you type messages back and forth on the telephone instead of talking or listening. These devices are also called TDD. When messages are typed on the keyboard, they are sent over the phone line to a receiving TTY. The message is shown on a monitor.  Use text messaging, social media, and email if it is hard for you to communicate by telephone.  Try to learn a listening  "technique called speechreading. It is not lipreading. You pay attention to people's gestures, expressions, posture, and tone of voice. These clues can help you understand what a person is saying. Face the person you are talking to, and have them face you. Make sure the lighting is good. You need to see the other person's face clearly.  Think about counseling if you need help to adjust to your hearing loss.  When should you call for help?  Watch closely for changes in your health, and be sure to contact your doctor if:    You think your hearing is getting worse.     You have new symptoms, such as dizziness or nausea.   Where can you learn more?  Go to https://www.[employer].net/patiented  Enter R798 in the search box to learn more about \"Hearing Loss: Care Instructions.\"  Current as of: September 27, 2023  Content Version: 14.2 2024 [employer].   Care instructions adapted under license by your healthcare professional. If you have questions about a medical condition or this instruction, always ask your healthcare professional. Healthwise, Incorporated disclaims any warranty or liability for your use of this information.    Learning About Sleeping Well  What does sleeping well mean?     Sleeping well means getting enough sleep to feel good and stay healthy. How much sleep is enough varies among people.  The number of hours you sleep and how you feel when you wake up are both important. If you do not feel refreshed, you probably need more sleep. Another sign of not getting enough sleep is feeling tired during the day.  Experts recommend that adults get at least 7 or more hours of sleep per day. Children and older adults need more sleep.  Why is getting enough sleep important?  Getting enough quality sleep is a basic part of good health. When your sleep suffers, your physical health, mood, and your thoughts can suffer too. You may find yourself feeling more grumpy or stressed. Not getting enough sleep also " "can lead to serious problems, including injury, accidents, anxiety, and depression.  What might cause poor sleeping?  Many things can cause sleep problems, including:  Changes to your sleep schedule.  Stress. Stress can be caused by fear about a single event, such as giving a speech. Or you may have ongoing stress, such as worry about work or school.  Depression, anxiety, and other mental or emotional conditions.  Changes in your sleep habits or surroundings. This includes changes that happen where you sleep, such as noise, light, or sleeping in a different bed. It also includes changes in your sleep pattern, such as having jet lag or working a late shift.  Health problems, such as pain, breathing problems, and restless legs syndrome.  Lack of regular exercise.  Using alcohol, nicotine, or caffeine before bed.  How can you help yourself?  Here are some tips that may help you sleep more soundly and wake up feeling more refreshed.  Your sleeping area   Use your bedroom only for sleeping and sex. A bit of light reading may help you fall asleep. But if it doesn't, do your reading elsewhere in the house. Try not to use your TV, computer, smartphone, or tablet while you are in bed.  Be sure your bed is big enough to stretch out comfortably, especially if you have a sleep partner.  Keep your bedroom quiet, dark, and cool. Use curtains, blinds, or a sleep mask to block out light. To block out noise, use earplugs, soothing music, or a \"white noise\" machine.  Your evening and bedtime routine   Create a relaxing bedtime routine. You might want to take a warm shower or bath, or listen to soothing music.  Go to bed at the same time every night. And get up at the same time every morning, even if you feel tired.  What to avoid   Limit caffeine (coffee, tea, caffeinated sodas) during the day, and don't have any for at least 6 hours before bedtime.  Avoid drinking alcohol before bedtime. Alcohol can cause you to wake up more often " "during the night.  Try not to smoke or use tobacco, especially in the evening. Nicotine can keep you awake.  Limit naps during the day, especially close to bedtime.  Avoid lying in bed awake for too long. If you can't fall asleep or if you wake up in the middle of the night and can't get back to sleep within about 20 minutes, get out of bed and go to another room until you feel sleepy.  Avoid taking medicine right before bed that may keep you awake or make you feel hyper or energized. Your doctor can tell you if your medicine may do this and if you can take it earlier in the day.  If you can't sleep   Imagine yourself in a peaceful, pleasant scene. Focus on the details and feelings of being in a place that is relaxing.  Get up and do a quiet or boring activity until you feel sleepy.  Avoid drinking any liquids before going to bed to help prevent waking up often to use the bathroom.  Where can you learn more?  Go to https://www.TWINLINX.net/patiented  Enter J942 in the search box to learn more about \"Learning About Sleeping Well.\"  Current as of: July 10, 2023  Content Version: 14.2 2024 [facility] [employer].   Care instructions adapted under license by your healthcare professional. If you have questions about a medical condition or this instruction, always ask your healthcare professional. Healthwise, Incorporated disclaims any warranty or liability for your use of this information.    Bladder Training: Care Instructions  Your Care Instructions     Bladder training is used to treat urge incontinence and stress incontinence. Urge incontinence means that the need to urinate comes on so fast that you can't get to a toilet in time. Stress incontinence means that you leak urine because of pressure on your bladder. For example, it may happen when you laugh, cough, or lift something heavy.  Bladder training can increase how long you can wait before you have to urinate. It can also help your bladder hold more urine. " And it can give you better control over the urge to urinate.  It is important to remember that bladder training takes a few weeks to a few months to make a difference. You may not see results right away, but don't give up.  Follow-up care is a key part of your treatment and safety. Be sure to make and go to all appointments, and call your doctor if you are having problems. It's also a good idea to know your test results and keep a list of the medicines you take.  How can you care for yourself at home?  Work with your doctor to come up with a bladder training program that is right for you. You may use one or more of the following methods.  Delayed urination  In the beginning, try to keep from urinating for 5 minutes after you first feel the need to go.  While you wait, take deep, slow breaths to relax. Kegel exercises can also help you delay the need to go to the bathroom.  After some practice, when you can easily wait 5 minutes to urinate, try to wait 10 minutes before you urinate.  Slowly increase the waiting period until you are able to control when you have to urinate.  Scheduled urination  Empty your bladder when you first wake up in the morning.  Schedule times throughout the day when you will urinate.  Start by going to the bathroom every hour, even if you don't need to go.  Slowly increase the time between trips to the bathroom.  When you have found a schedule that works well for you, keep doing it.  If you wake up during the night and have to urinate, do it. Apply your schedule to waking hours only.  Kegel exercises  These tighten and strengthen pelvic muscles, which can help you control the flow of urine. (If doing these exercises causes pain, stop doing them and talk with your doctor.) To do Kegel exercises:  Squeeze your muscles as if you were trying not to pass gas. Or squeeze your muscles as if you were stopping the flow of urine. Your belly, legs, and buttocks shouldn't move.  Hold the squeeze for 3  "seconds, then relax for 5 to 10 seconds.  Start with 3 seconds, then add 1 second each week until you are able to squeeze for 10 seconds.  Repeat the exercise 10 times a session. Do 3 to 8 sessions a day.  When should you call for help?  Watch closely for changes in your health, and be sure to contact your doctor if:    Your incontinence is getting worse.     You do not get better as expected.   Where can you learn more?  Go to https://www.[employer].net/patiented  Enter V684 in the search box to learn more about \"Bladder Training: Care Instructions.\"  Current as of: November 15, 2023  Content Version: 14.2 2024 [facility] [employer].   Care instructions adapted under license by your healthcare professional. If you have questions about a medical condition or this instruction, always ask your healthcare professional. Healthwise, Incorporated disclaims any warranty or liability for your use of this information.       "

## 2024-11-20 LAB
ANION GAP SERPL CALCULATED.3IONS-SCNC: 14 MMOL/L (ref 7–15)
BUN SERPL-MCNC: 16.6 MG/DL (ref 8–23)
CALCIUM SERPL-MCNC: 10.4 MG/DL (ref 8.8–10.4)
CHLORIDE SERPL-SCNC: 100 MMOL/L (ref 98–107)
CHOLEST SERPL-MCNC: 210 MG/DL
CREAT SERPL-MCNC: 1.28 MG/DL (ref 0.67–1.17)
CREAT UR-MCNC: 15.8 MG/DL
EGFRCR SERPLBLD CKD-EPI 2021: 58 ML/MIN/1.73M2
FASTING STATUS PATIENT QL REPORTED: NO
FASTING STATUS PATIENT QL REPORTED: NO
GLUCOSE SERPL-MCNC: 162 MG/DL (ref 70–99)
HCO3 SERPL-SCNC: 26 MMOL/L (ref 22–29)
HDLC SERPL-MCNC: 47 MG/DL
LDLC SERPL CALC-MCNC: 114 MG/DL
MICROALBUMIN UR-MCNC: <12 MG/L
MICROALBUMIN/CREAT UR: NORMAL MG/G{CREAT}
NONHDLC SERPL-MCNC: 163 MG/DL
POTASSIUM SERPL-SCNC: 4.2 MMOL/L (ref 3.4–5.3)
SODIUM SERPL-SCNC: 140 MMOL/L (ref 135–145)
TRIGL SERPL-MCNC: 243 MG/DL
TSH SERPL DL<=0.005 MIU/L-ACNC: 1.17 UIU/ML (ref 0.3–4.2)
VIT B12 SERPL-MCNC: 513 PG/ML (ref 232–1245)

## 2024-11-30 DIAGNOSIS — R80.9 TYPE 2 DIABETES MELLITUS WITH MICROALBUMINURIA, WITHOUT LONG-TERM CURRENT USE OF INSULIN (H): ICD-10-CM

## 2024-11-30 DIAGNOSIS — N18.31 CHRONIC KIDNEY DISEASE, STAGE 3A (H): ICD-10-CM

## 2024-11-30 DIAGNOSIS — E11.29 TYPE 2 DIABETES MELLITUS WITH MICROALBUMINURIA, WITHOUT LONG-TERM CURRENT USE OF INSULIN (H): ICD-10-CM

## 2024-12-02 RX ORDER — CANAGLIFLOZIN 100 MG/1
TABLET, FILM COATED ORAL
Qty: 90 TABLET | Refills: 0 | Status: SHIPPED | OUTPATIENT
Start: 2024-12-02

## 2024-12-29 DIAGNOSIS — R80.9 TYPE 2 DIABETES MELLITUS WITH MICROALBUMINURIA, WITHOUT LONG-TERM CURRENT USE OF INSULIN (H): ICD-10-CM

## 2024-12-29 DIAGNOSIS — E11.29 TYPE 2 DIABETES MELLITUS WITH MICROALBUMINURIA, WITHOUT LONG-TERM CURRENT USE OF INSULIN (H): ICD-10-CM

## 2024-12-29 RX ORDER — LANCETS
EACH MISCELLANEOUS
Qty: 100 EACH | Refills: 2 | Status: SHIPPED | OUTPATIENT
Start: 2024-12-29 | End: 2024-12-30

## 2024-12-30 RX ORDER — LANCETS
EACH MISCELLANEOUS
Qty: 100 EACH | Refills: 2 | Status: SHIPPED | OUTPATIENT
Start: 2024-12-30 | End: 2024-12-31

## 2024-12-31 ENCOUNTER — TELEPHONE (OUTPATIENT)
Dept: FAMILY MEDICINE | Facility: CLINIC | Age: 79
End: 2024-12-31
Payer: MEDICARE

## 2024-12-31 DIAGNOSIS — E11.9 TYPE 2 DIABETES MELLITUS WITHOUT COMPLICATION (H): ICD-10-CM

## 2024-12-31 DIAGNOSIS — R80.9 TYPE 2 DIABETES MELLITUS WITH MICROALBUMINURIA, WITHOUT LONG-TERM CURRENT USE OF INSULIN (H): ICD-10-CM

## 2024-12-31 DIAGNOSIS — E11.29 TYPE 2 DIABETES MELLITUS WITH MICROALBUMINURIA, WITHOUT LONG-TERM CURRENT USE OF INSULIN (H): ICD-10-CM

## 2024-12-31 NOTE — TELEPHONE ENCOUNTER
New Medication Request    Contacts       Contact Date/Time Type Contact Phone/Fax    12/31/2024 01:35 PM CST Fax (Incoming) [facility]/pharmacy #[id number] [facility] [address] [facility] (Pharmacy) [phone]            What medication are you requesting?: Alternative for Microlet Lancets MISC    Reason for medication request: pharmacy sent in request. Alternative requested because max once daily for non insulin patients. Please send an alternative    Have you taken this medication before?: No    Controlled Substance Agreement on file:   CSA -- Patient Level:    CSA: None found at the patient level.         Patient offered an appointment? No    Preferred Pharmacy:    [facility]/pharmacy #[id number] - [place], [place] - [address] [address]  Phone: [phone] Fax: [phone]      [facility] or would you prefer to receive a phone call?:   Patient would prefer a phone call   Okay to leave a detailed message?: Yes at Home number on file [phone] (home)

## 2024-12-31 NOTE — TELEPHONE ENCOUNTER
Pharmacy requesting alternative sig-stating : Max once daily for non-insulin patients    Insurance purposes

## 2025-01-02 RX ORDER — LANCETS
EACH MISCELLANEOUS
Qty: 100 EACH | Refills: 2 | Status: SHIPPED | OUTPATIENT
Start: 2025-01-02

## 2025-01-09 RX ORDER — LANCETS
EACH MISCELLANEOUS
Qty: 100 EACH | Refills: 2 | Status: SHIPPED | OUTPATIENT
Start: 2025-01-09

## 2025-01-11 DIAGNOSIS — I10 ESSENTIAL HYPERTENSION: ICD-10-CM

## 2025-01-12 RX ORDER — CARVEDILOL 25 MG/1
25 TABLET ORAL 2 TIMES DAILY WITH MEALS
Qty: 180 TABLET | Refills: 1 | OUTPATIENT
Start: 2025-01-12

## 2025-02-05 ENCOUNTER — OFFICE VISIT (OUTPATIENT)
Dept: AUDIOLOGY | Facility: CLINIC | Age: 80
End: 2025-02-05
Payer: MEDICARE

## 2025-02-05 DIAGNOSIS — H90.3 BILATERAL SENSORINEURAL HEARING LOSS: Primary | ICD-10-CM

## 2025-02-05 PROCEDURE — 92591 PR HEARING AID EXAM BINAURAL: CPT | Performed by: AUDIOLOGIST

## 2025-02-05 PROCEDURE — V5275 EAR IMPRESSION: HCPCS | Mod: RT | Performed by: AUDIOLOGIST

## 2025-02-05 NOTE — PROGRESS NOTES
AUDIOLOGY REPORT    SUBJECTIVE: MERRITT King is a 79 year old male was seen in the Audiology Clinic at  [facility] on 2/05/25 to discuss concerns with hearing and functional communication difficulties. The patient was accompanied by their . 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  The patient was medically evaluated and determined to be cleared for binaural hearing aids by Dr. Edison Banda on 11/19/2024.   MERRITT GARZA notes difficulty with communication in a variety of listening situations.    OBJECTIVE:    Patient is a hearing aid candidate. Patient would like to move forward with a hearing aid evaluation today. Therefore, the patient was presented with different options for amplification to help aid in communication. Discussed styles, levels of technology and monaural vs. binaural fitting.     The hearing aid(s) mutually chosen were:  Binaural: Phonak Audeo Infinio 70-R  COLOR: Graphite Gray  BATTERY SIZE: rechargeable  EARMOLD/TIPS: Pink cShell canal lock with AOV vent  CANAL/ LENGTH: 1P receivers right and left    Otoscopy revealed ears are clear of cerumen bilaterally. Bilateral earmolds were taken without incident.    ASSESSMENT:     Reviewed purchase information and warranty information with patient. The 45 day trial period was explained to patient. The patient was given a copy of the [place] [employer] consumer brochure on purchasing hearing instruments. Patient risk factors have been provided to the patient in writing prior to the sale of the hearing aid per FDA regulation. The risk factors are also available in the User Instructional Booklet to be presented on the day of the hearing aid fitting. Hearing aids and earmolds ordered. Hearing aid evaluation completed.    PLAN: MERRITT GARZA is scheduled  to return on 3/10/2025 for a hearing aid fitting and programming. Purchase agreement will be completed on that date. Please contact this clinic with any questions or concerns.    Jarod Conklin, CCC-A  [place] Licensed Audiologist #[id number]

## 2025-03-03 DIAGNOSIS — R80.9 TYPE 2 DIABETES MELLITUS WITH MICROALBUMINURIA, WITHOUT LONG-TERM CURRENT USE OF INSULIN (H): ICD-10-CM

## 2025-03-03 DIAGNOSIS — N39.41 URGE INCONTINENCE OF URINE: ICD-10-CM

## 2025-03-03 DIAGNOSIS — E11.29 TYPE 2 DIABETES MELLITUS WITH MICROALBUMINURIA, WITHOUT LONG-TERM CURRENT USE OF INSULIN (H): ICD-10-CM

## 2025-03-04 RX ORDER — MULTIVITAMIN
1 TABLET ORAL DAILY
Qty: 90 TABLET | Refills: 4 | OUTPATIENT
Start: 2025-03-04

## 2025-03-05 RX ORDER — MIRABEGRON 25 MG/1
25 TABLET, FILM COATED, EXTENDED RELEASE ORAL DAILY
Qty: 90 TABLET | Refills: 0 | Status: SHIPPED | OUTPATIENT
Start: 2025-03-05

## 2025-03-06 DIAGNOSIS — E11.29 TYPE 2 DIABETES MELLITUS WITH MICROALBUMINURIA, WITHOUT LONG-TERM CURRENT USE OF INSULIN (H): ICD-10-CM

## 2025-03-06 DIAGNOSIS — R80.9 TYPE 2 DIABETES MELLITUS WITH MICROALBUMINURIA, WITHOUT LONG-TERM CURRENT USE OF INSULIN (H): ICD-10-CM

## 2025-03-06 DIAGNOSIS — N18.31 CHRONIC KIDNEY DISEASE, STAGE 3A (H): ICD-10-CM

## 2025-03-06 RX ORDER — CANAGLIFLOZIN 100 MG/1
TABLET, FILM COATED ORAL
Qty: 90 TABLET | Refills: 0 | Status: SHIPPED | OUTPATIENT
Start: 2025-03-06

## 2025-03-10 ENCOUNTER — OFFICE VISIT (OUTPATIENT)
Dept: AUDIOLOGY | Facility: CLINIC | Age: 80
End: 2025-03-10
Payer: MEDICARE

## 2025-03-10 DIAGNOSIS — H90.3 BILATERAL SENSORINEURAL HEARING LOSS: Primary | ICD-10-CM

## 2025-03-10 PROCEDURE — T1013 SIGN LANG/ORAL INTERPRETER: HCPCS

## 2025-03-10 PROCEDURE — V5264 EAR MOLD/INSERT: HCPCS | Mod: NU | Performed by: AUDIOLOGIST

## 2025-03-10 PROCEDURE — V5011 HEARING AID FITTING/CHECKING: HCPCS | Performed by: AUDIOLOGIST

## 2025-03-10 PROCEDURE — V5020 CONFORMITY EVALUATION: HCPCS | Mod: LT | Performed by: AUDIOLOGIST

## 2025-03-10 PROCEDURE — V5020 CONFORMITY EVALUATION: HCPCS | Mod: RT | Performed by: AUDIOLOGIST

## 2025-03-10 PROCEDURE — V5261 HEARING AID, DIGIT, BIN, BTE: HCPCS | Mod: NU | Performed by: AUDIOLOGIST

## 2025-03-10 PROCEDURE — V5160 DISPENSING FEE BINAURAL: HCPCS | Performed by: AUDIOLOGIST

## 2025-03-10 NOTE — PATIENT INSTRUCTIONS

## 2025-03-10 NOTE — PROGRESS NOTES
AUDIOLOGY REPORT - HEARING AID FITTING    SUBJECTIVE: MERRITT King, a 79 year old male, was seen in the Audiology Clinic at [facility] today for a Binaural hearing aid fitting.  The patient was accompanied by their . 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  The patient was medically evaluated and determined to be cleared for binaural hearing aids by Dr. Edison Banda on 11/19/2024.     OBJECTIVE:  Prior to fitting, a hearing aid check was performed to ensure device functionality. The hearing aid conformity evaluation was completed.The hearing aids were placed and they provided a good fit. Real-ear-probe-microphone measurements were completed on the My[employer] system and were a good match to NAL-NL2 target with soft sounds audible, moderate sounds comfortable, and loud sounds below discomfort. UCLs are verified through maximum power output measures and demonstrate appropriate limiting of loud inputs. Mr. King was oriented to proper hearing aid use, care, cleaning (no water, dry brush), batteries (size rechargeable, insertion/removal, toxicity, low-battery signal), aid insertion/removal, user booklet, warranty information, storage cases, and other hearing aid details. The patient confirmed understanding of hearing aid use and care, and showed proper insertion of hearing aid and batteries while in the office today. Mr. King reported good volume and sound quality today.    He is set at 90% of targets and given a volume control.  Will teach him how to change wax traps and connect phone at next visit.  He did very well today.    EAR(S) FIT: Binaural  MA HEARING AID MAKE: Right: Phonak Audeo I70-R graphite gray; Left: Phonak Audeo I70-R   graphite gray    MA HEARING AID MODEL #: Right: [id number]; Left: [id number]  HEARING  AID STYLE: Right: VANNESSA; Left: VANNESSA   EARMOLDS: Right: Phonak cShell 6.0 Acrylic, pink, P1   SN: [id number]    ACC: 404 [id number]; Left:  Phonak cShell 6.0 Acrylic, pink, P1  SN: [id number]    ACC: 403 542  SERIAL NUMBERS: Right: [id number]; Left: [id number]  WARRANTY END DATE: Right: 3/13/2028; Left:: 3/13/2028       ASSESSMENT: Binaural Phonak Audeo I70-R hearing aid fitting completed today. Verification measures were performed. The 45 day trial period was explained to patient, and they expressed understanding. Mr. King signed the Hearing Aid Purchase Agreement and was given a copy, as well as details on his hearing aids. Patient was counseled that exact out of pocket amounts cannot be determined for hearing aid claims being sent to insurance. Any insurance coverage information presented to the patient is an estimate only, and is not a guarantee of payment. Patient has been advised to check with their own insurance.    PLAN: Mr. King will return for follow-up in 2-3 weeks for a hearing aid review appointment. Please call this clinic with questions regarding today s appointment.    Paul Conklin., CCC-A  [place] Licensed Audiologist #[id number]

## 2025-03-19 DIAGNOSIS — G62.9 POLYNEUROPATHY: ICD-10-CM

## 2025-03-20 RX ORDER — ACETAMINOPHEN 325 MG/1
325-650 TABLET ORAL EVERY 8 HOURS PRN
Qty: 180 TABLET | Refills: 0 | Status: SHIPPED | OUTPATIENT
Start: 2025-03-20

## 2025-04-24 ENCOUNTER — APPOINTMENT (OUTPATIENT)
Dept: INTERPRETER SERVICES | Facility: CLINIC | Age: 80
End: 2025-04-24
Payer: MEDICARE

## 2025-04-24 ENCOUNTER — TELEPHONE (OUTPATIENT)
Dept: AUDIOLOGY | Facility: CLINIC | Age: 80
End: 2025-04-24
Payer: MEDICARE

## 2025-04-24 NOTE — TELEPHONE ENCOUNTER
Spoke to patient with help of .  Apologized for having to reschedule his appointment due to my flat tire.  He was rescheduled for July, but was able to get him in on 4/28/2025 for his initial check on his new hearing aids. He is happy with this and he also reports he has been doing well with his hearing aids.     Jarod Conklin, CCC-A  [place] Licensed Audiologist #[id number]

## 2025-04-28 ENCOUNTER — OFFICE VISIT (OUTPATIENT)
Dept: AUDIOLOGY | Facility: CLINIC | Age: 80
End: 2025-04-28
Payer: MEDICARE

## 2025-04-28 DIAGNOSIS — H90.3 BILATERAL SENSORINEURAL HEARING LOSS: Primary | ICD-10-CM

## 2025-04-28 PROCEDURE — T1013 SIGN LANG/ORAL INTERPRETER: HCPCS | Performed by: INTERPRETER

## 2025-04-28 PROCEDURE — V5010 ASSESSMENT FOR HEARING AID: HCPCS | Performed by: AUDIOLOGIST

## 2025-04-28 NOTE — PROGRESS NOTES
AUDIOLOGY REPORT    SUBJECTIVE:RAINA King is a 79 year old male who was seen in the Audiology Clinic at the [facility] on 4/28/2025  for a follow-up check regarding the fitting of new hearing aids. The patient was accompanied by their . Raina has been seen previously on 1/31/2025, and results revealed a moderate to severe sensorineural hearing loss in the left ear and moderate to profound sensorineural hearing loss in the right ear. The patient was medically evaluated and determined to be cleared for binaural hearing aids by Dr. Eidson Banda on 11/19/2024. The patient has been seen previously in this clinic and was fit with binaural Phonak Audeo I70-R hearing aids with cshell canal lock earmolds on 3/10/2025.    He reports the hearing aids are comfortable and fitting well.  He does report the sound quality is such that people sound like they are talking through a speaker.  Things are not very clear at times for him.  He does state he is hearing better with these hearing aids over his old devices and likes the fit of them a lot. He is doing well charging them.     OBJECTIVE:       Based on patient report, the following changes were made:    Showed how to change wax traps and he demonstrated this independently today.  Gave extras.   Took noise block and soft noise reduction up a bit today in autosense programs.  He is happy with the volume so no changes made there (he is at 90% of targets).   He does not want any connectivity to his cell phone at this time.   Counseled patient regarding his 60% word recognition scores and realistic expectations with the hearing aids. 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   He states he is overall happy with the hearing aids and would like to keep them.     Reviewed 45 day trial period, care, cleaning (no water, dry brush),  batteries (size rechargeable) insertion/removal, toxicity, low-battery signal), aid insertion/removal, volume adjustment (if applicable), user booklet, warranty information, storage cases, and other hearing aid details.        ASSESSMENT: A follow-up appointment for hearing aid fitting was completed today.  Changes to hearing aid was completed as outlined above.     PLAN:MERRITT GREG will return for follow-up as needed, or at least every 6-9 months for cleaning and assessment of hearing aid.  . Please call this clinic with any questions regarding today s appointment.    Jarod Conklin, CCC-A  [place] Licensed Audiologist #[id number]

## 2025-05-07 DIAGNOSIS — N39.41 URGE INCONTINENCE OF URINE: ICD-10-CM

## 2025-05-07 NOTE — TELEPHONE ENCOUNTER
Medication Question or Refill    Contacts       Contact Date/Time Type Contact Phone/Fax    05/07/2025 11:10 AM CDT Phone (Incoming) [facility]/pharmacy #[id number] - [place], [place] - [address] (Pharmacy) [phone]            What medication are you calling about (include dose and sig)?:   MYRBETRIQ 25 MG 24 hr tablet  0 ordered       25 mg, DAILY      PHARMACY REQUESTING A 90 DAY RX    Preferred Pharmacy:     [facility]/pharmacy #[id number] - [place] - [address] [address] [facility] [address]  Phone: [phone] Fax: [phone]      Controlled Substance Agreement on file:   CSA -- Patient Level:    CSA: None found at the patient level.       Who prescribed the medication?: pcp    Do you need a refill? Yes    When did you use the medication last? na

## 2025-05-08 ENCOUNTER — TELEPHONE (OUTPATIENT)
Dept: AUDIOLOGY | Facility: CLINIC | Age: 80
End: 2025-05-08
Payer: MEDICARE

## 2025-05-08 NOTE — TELEPHONE ENCOUNTER
Spoke to patient with .  Patient called stating he is receiving a bill for about $100 for hearing aid services.  He states the date of service on the bill is 1/31/2025 and 2/5/2025.  These appointments were for the hearing test and hearing aid consultation.  I let him know that without seeing the bill it is difficult for me to help.  I advised he call the billing department to discuss.  Gave him the billing department phone number and he states he will have his children call to see what the bill is from. Encouraged him to reach out to me with further questions.     Jarod Conklin, CCC-A  [place] Licensed Audiologist #[id number]

## 2025-05-11 RX ORDER — MIRABEGRON 25 MG/1
25 TABLET, FILM COATED, EXTENDED RELEASE ORAL DAILY
Qty: 90 TABLET | Refills: 0 | Status: SHIPPED | OUTPATIENT
Start: 2025-05-11

## 2025-05-12 NOTE — TELEPHONE ENCOUNTER
Future Appointments 5/12/2025 - 11/8/2025        Date Visit Type Length Department Provider     6/11/2025  4:40 PM OFFICE VISIT 20 min [facility] Edison Banda MD    Location Instructions:     [facility] is located at [address] in [place], at the intersection of [facility] [facility] [address]. This is one block south of the [facility]. Free parking is available in the lot directly north of the clinic across [facility]. The clinic is near stops along bus [address].              10/27/2025 10:00 AM HEARING AID CHECK 30 min [facility] Shilpa Leblanc AuD    Location Instructions:     Our clinic is located at:  [facility]  [place], [place]&nbsp; [address]  How to find our clinic: We are located in the [facility], across the street from [facility]. Please take the stairs or elevator to the second floor. Checkin is at the  of the [facility], [address]  Parking: Free parking available in surface lots  Questions or to Reschedule: Contact our scheduling number: [phone].

## 2025-05-14 DIAGNOSIS — I10 ESSENTIAL HYPERTENSION: ICD-10-CM

## 2025-05-14 DIAGNOSIS — R80.9 TYPE 2 DIABETES MELLITUS WITH MICROALBUMINURIA, WITHOUT LONG-TERM CURRENT USE OF INSULIN (H): ICD-10-CM

## 2025-05-14 DIAGNOSIS — E11.29 TYPE 2 DIABETES MELLITUS WITH MICROALBUMINURIA, WITHOUT LONG-TERM CURRENT USE OF INSULIN (H): ICD-10-CM

## 2025-05-14 RX ORDER — LISINOPRIL 20 MG/1
20 TABLET ORAL DAILY
Qty: 90 TABLET | Refills: 1 | Status: SHIPPED | OUTPATIENT
Start: 2025-05-14

## 2025-05-15 DIAGNOSIS — I10 ESSENTIAL HYPERTENSION: ICD-10-CM

## 2025-05-15 RX ORDER — DILTIAZEM HYDROCHLORIDE 240 MG/1
240 CAPSULE, COATED, EXTENDED RELEASE ORAL DAILY
Qty: 90 CAPSULE | Refills: 2 | Status: SHIPPED | OUTPATIENT
Start: 2025-05-15

## 2025-05-15 RX ORDER — GLIPIZIDE 5 MG/1
10 TABLET ORAL 2 TIMES DAILY WITH MEALS
Qty: 360 TABLET | Refills: 2 | Status: SHIPPED | OUTPATIENT
Start: 2025-05-15

## 2025-05-18 ENCOUNTER — HEALTH MAINTENANCE LETTER (OUTPATIENT)
Age: 80
End: 2025-05-18

## 2025-05-19 ENCOUNTER — APPOINTMENT (OUTPATIENT)
Dept: INTERPRETER SERVICES | Facility: CLINIC | Age: 80
End: 2025-05-19
Payer: MEDICARE

## 2025-06-02 DIAGNOSIS — N18.31 CHRONIC KIDNEY DISEASE, STAGE 3A (H): ICD-10-CM

## 2025-06-02 DIAGNOSIS — E11.29 TYPE 2 DIABETES MELLITUS WITH MICROALBUMINURIA, WITHOUT LONG-TERM CURRENT USE OF INSULIN (H): ICD-10-CM

## 2025-06-02 DIAGNOSIS — R80.9 TYPE 2 DIABETES MELLITUS WITH MICROALBUMINURIA, WITHOUT LONG-TERM CURRENT USE OF INSULIN (H): ICD-10-CM

## 2025-06-03 NOTE — TELEPHONE ENCOUNTER
Contacted patient using an  and relayed message below  Appointment already scheduled on 6/11/25  No further action needed    Sabiha Shankar RN  [facility]

## 2025-06-08 ENCOUNTER — HEALTH MAINTENANCE LETTER (OUTPATIENT)
Age: 80
End: 2025-06-08

## 2025-06-11 ENCOUNTER — OFFICE VISIT (OUTPATIENT)
Dept: FAMILY MEDICINE | Facility: CLINIC | Age: 80
End: 2025-06-11
Payer: MEDICARE

## 2025-06-11 VITALS
BODY MASS INDEX: 29.37 KG/M2 | RESPIRATION RATE: 16 BRPM | WEIGHT: 172 LBS | OXYGEN SATURATION: 97 % | TEMPERATURE: 97.7 F | DIASTOLIC BLOOD PRESSURE: 84 MMHG | HEART RATE: 69 BPM | HEIGHT: 64 IN | SYSTOLIC BLOOD PRESSURE: 122 MMHG

## 2025-06-11 DIAGNOSIS — R80.9 TYPE 2 DIABETES MELLITUS WITH MICROALBUMINURIA, WITHOUT LONG-TERM CURRENT USE OF INSULIN (H): Primary | ICD-10-CM

## 2025-06-11 DIAGNOSIS — E11.29 TYPE 2 DIABETES MELLITUS WITH MICROALBUMINURIA, WITHOUT LONG-TERM CURRENT USE OF INSULIN (H): Primary | ICD-10-CM

## 2025-06-11 LAB
EST. AVERAGE GLUCOSE BLD GHB EST-MCNC: 154 MG/DL
HBA1C MFR BLD: 7 % (ref 0–5.6)

## 2025-06-11 PROCEDURE — 83036 HEMOGLOBIN GLYCOSYLATED A1C: CPT | Performed by: FAMILY MEDICINE

## 2025-06-11 PROCEDURE — 36415 COLL VENOUS BLD VENIPUNCTURE: CPT | Performed by: FAMILY MEDICINE

## 2025-06-11 PROCEDURE — 80061 LIPID PANEL: CPT | Performed by: FAMILY MEDICINE

## 2025-06-11 PROCEDURE — 80048 BASIC METABOLIC PNL TOTAL CA: CPT | Performed by: FAMILY MEDICINE

## 2025-06-11 RX ORDER — MULTIVITAMIN
1 TABLET ORAL DAILY
Qty: 90 TABLET | Refills: 4 | Status: SHIPPED | OUTPATIENT
Start: 2025-06-11

## 2025-06-11 NOTE — PROGRESS NOTES
Prior to immunization administration, verified patients identity using patient s name and date of birth. Please see Immunization Activity for additional information.     Screening Questionnaire for Adult Immunization    Are you sick today?   No   Do you have allergies to medications, food, a vaccine component or latex?   No   Have you ever had a serious reaction after receiving a vaccination?   No   Do you have a long-term health problem with heart, lung, kidney, or metabolic disease (e.g., diabetes), asthma, a blood disorder, no spleen, complement component deficiency, a cochlear implant, or a spinal fluid leak?  Are you on long-term aspirin therapy?   Yes   Do you have cancer, leukemia, HIV/AIDS, or any other immune system problem?   No   Do you have a parent, brother, or sister with an immune system problem?   No   In the past 3 months, have you taken medications that affect  your immune system, such as prednisone, other steroids, or anticancer drugs; drugs for the treatment of rheumatoid arthritis, Crohn s disease, or psoriasis; or have you had radiation treatments?   No   Have you had a seizure, or a brain or other nervous system problem?   No   During the past year, have you received a transfusion of blood or blood    products, or been given immune (gamma) globulin or antiviral drug?   No   For women: Are you pregnant or is there a chance you could become       pregnant during the next month?   No   Have you received any vaccinations in the past 4 weeks?   No     Immunization questionnaire was positive for at least one answer.  Notified       Patient instructed to remain in clinic for 15 minutes afterwards, and to report any adverse reactions.     Screening performed by Basilio Siegel MA on 6/11/2025 at 3:53 PM.

## 2025-06-11 NOTE — PROGRESS NOTES
"  {PROVIDER CHARTING PREFERENCE:353821}    Subjective   MERRITT GARZA is a 79 year old, presenting for the following health issues:  Form Request (Dmv/), Diabetes, Hypertension, and Recheck Medication      6/11/2025     3:51 PM   Additional Questions   Roomed by arsalan brito   Accompanied by self and son     History of Present Illness       Hyperlipidemia:  He presents for follow up of hyperlipidemia.   He is taking medication to lower cholesterol. He is not having myalgia or other side effects to statin medications.    Hypertension: He presents for follow up of hypertension.  He does check blood pressure  regularly outside of the clinic. Outpatient blood pressures have not been over 140/90. He does not follow a low salt diet.     He eats 0-1 servings of fruits and vegetables daily.He consumes 0 sweetened beverage(s) daily.He exercises with enough effort to increase his heart rate 9 or less minutes per day.  He exercises with enough effort to increase his heart rate 3 or less days per week.   He is taking medications regularly.        {MA/LPN/RN Pre-Provider Visit Orders- hCG/UA/Strep (Optional):193787}  {SUPERLIST (Optional):597221}  {additonal problems for provider to add (Optional):152713}    {ROS Picklists (Optional):453437}      Objective    BP (!) 141/87 (BP Location: Left arm, Patient Position: Sitting, Cuff Size: Adult Regular)   Pulse 69   Temp 97.7  F (36.5  C) (Temporal)   Resp 16   Ht 1.625 m (5' 3.98\")   Wt 78 kg (172 lb)   SpO2 97%   BMI 29.55 kg/m    Body mass index is 29.55 kg/m .  Physical Exam   {Exam List (Optional):223876}    {Diagnostic Test Results (Optional):996807}        Signed Electronically by: Edison Banda MD  {Email feedback regarding this note to [email or web address]   :170424}  "

## 2025-06-12 LAB
ANION GAP SERPL CALCULATED.3IONS-SCNC: 14 MMOL/L (ref 7–15)
BUN SERPL-MCNC: 28.4 MG/DL (ref 8–23)
CALCIUM SERPL-MCNC: 10.3 MG/DL (ref 8.8–10.4)
CHLORIDE SERPL-SCNC: 104 MMOL/L (ref 98–107)
CHOLEST SERPL-MCNC: 132 MG/DL
CREAT SERPL-MCNC: 1.58 MG/DL (ref 0.67–1.17)
EGFRCR SERPLBLD CKD-EPI 2021: 45 ML/MIN/1.73M2
FASTING STATUS PATIENT QL REPORTED: NO
FASTING STATUS PATIENT QL REPORTED: NO
GLUCOSE SERPL-MCNC: 58 MG/DL (ref 70–99)
HCO3 SERPL-SCNC: 23 MMOL/L (ref 22–29)
HDLC SERPL-MCNC: 47 MG/DL
LDLC SERPL CALC-MCNC: 68 MG/DL
NONHDLC SERPL-MCNC: 85 MG/DL
POTASSIUM SERPL-SCNC: 4.3 MMOL/L (ref 3.4–5.3)
SODIUM SERPL-SCNC: 141 MMOL/L (ref 135–145)
TRIGL SERPL-MCNC: 86 MG/DL

## 2025-06-13 ENCOUNTER — RESULTS FOLLOW-UP (OUTPATIENT)
Dept: FAMILY MEDICINE | Facility: CLINIC | Age: 80
End: 2025-06-13

## 2025-06-13 PROBLEM — N18.31 CHRONIC KIDNEY DISEASE, STAGE 3A (H): Status: ACTIVE | Noted: 2022-08-30

## 2025-06-28 DIAGNOSIS — I10 ESSENTIAL HYPERTENSION: ICD-10-CM

## 2025-06-28 DIAGNOSIS — E87.1 HYPONATREMIA: ICD-10-CM

## 2025-06-30 RX ORDER — FUROSEMIDE 20 MG/1
20 TABLET ORAL DAILY
Qty: 90 TABLET | Refills: 3 | Status: SHIPPED | OUTPATIENT
Start: 2025-06-30

## 2025-07-31 ENCOUNTER — TELEPHONE (OUTPATIENT)
Dept: FAMILY MEDICINE | Facility: CLINIC | Age: 80
End: 2025-07-31
Payer: MEDICARE